# Patient Record
Sex: FEMALE | Race: WHITE | Employment: UNEMPLOYED | URBAN - METROPOLITAN AREA
[De-identification: names, ages, dates, MRNs, and addresses within clinical notes are randomized per-mention and may not be internally consistent; named-entity substitution may affect disease eponyms.]

---

## 2017-02-01 ENCOUNTER — OFFICE VISIT (OUTPATIENT)
Dept: NEUROLOGY | Age: 72
End: 2017-02-01

## 2017-02-01 DIAGNOSIS — G30.0 EARLY ONSET ALZHEIMER'S DEMENTIA WITHOUT BEHAVIORAL DISTURBANCE (HCC): ICD-10-CM

## 2017-02-01 DIAGNOSIS — F70 MENTAL RETARDATION, MILD (I.Q. 50-70): ICD-10-CM

## 2017-02-01 DIAGNOSIS — F79 MENTAL RETARDATION: ICD-10-CM

## 2017-02-01 DIAGNOSIS — R41.3 MEMORY LOSS: Primary | ICD-10-CM

## 2017-02-01 DIAGNOSIS — F20.5 RESIDUAL SCHIZOPHRENIA (HCC): ICD-10-CM

## 2017-02-01 DIAGNOSIS — F02.80 ALZHEIMER'S DISEASE OF OTHER ONSET: Primary | ICD-10-CM

## 2017-02-01 DIAGNOSIS — F02.80 EARLY ONSET ALZHEIMER'S DEMENTIA WITHOUT BEHAVIORAL DISTURBANCE (HCC): ICD-10-CM

## 2017-02-01 DIAGNOSIS — G30.8 ALZHEIMER'S DISEASE OF OTHER ONSET: Primary | ICD-10-CM

## 2017-02-01 NOTE — PROGRESS NOTES
[address] [facility],5Th Floor  [address] [facility]   [facility] [address] [facility]   [place]   [phone] Office   [phone] Fax      Neuropsychology    Exam # 2    Initial Diagnostic Interview Note      Referral:  Michelle Rosales MD, Dr. De Los Santoskaren Saeed is a 70 y.o. right handed female who was accompanied by her sister to the initial clinical interview on 2/1/17 . Please refer to her medical records for details pertaining to her history. When I saw her last (2013): Susan Matthew is a 76 y.o. right handed  female who was accompanied by her sister and brother in law to the initial clinical interview. Please refer to her medical records for details pertaining to her history. Briefly, the patient's mother passed away in 2008 and patient has been living with them since that time. She has been showing a progressive decline in short term memory. Decided she was too fat in September and refused to eat solid food. Has been only eating liquids since then. Ensure and Boost only. She does not read or write. Has lived with her parents (and now sister) her whole life. Pureed food as well. Gag reflex when taking pills. May have completed the second year. She has had chronic problems with learning. Patient does talk about daily activities. News events and things. Needs more assistance. Forgets to put the dishes away, pick her clothes up, etc. Not driving. Talks to herself a lot. Most of the time when she talks to herself she is having a happy conversation with herself.      Says I'm fat. I'm too fat. 10-15 times. Pull up her shirt, look at stomach, saying she's fat. While she's drinking a shake. Very impulsive about it. Washes her hands 15 times while trying to eats. Gags herself. Used to be a lot more fun. Was able to do some chores.      No psychiatrist currently.      Was in [place] when age 25s.      Schizophrenia versus schizoaffective. Dementia questions. MR?     My diagnostic impressions at that time included: This patient generated abnormal range neurocognitive profile with respect to neurocognitive functioning. In this regard, she is showing mild impairments with memory and executive functioning and construction. Her performance across all other neurocognitive domains assessed were within normal limits. Emotionally, there is concern for mild anxiety, which likely exacerbates the underlying cognitive problems to some degree. .   In my opinion, this profile is consistent with chronic MR and psychiatric problems (likely schizophrenia) exacerbated by what is now evident to be at least mild to moderate dementia. I suggest consideration for memory management medication, medication for attention, and a review of her psychiatric medication management. She should be encouraged to remain as mentally, socially, and physically active as possible.      The patient's generated cognitive difficulties are significant to the degree whereby it is my opinion that she should not live independently without appropriate supervision for all ADLs, including nutritional/meal preparation supervision, medication management supervision, and supervision of financial dealings. She is not competent. No driving. She lives with her sister and her family and so long as they are able to provide an appropriate level of supervision for her, I agree with her current living arrangement. She may, however, require increased supervision over time, and a plan should be in place for same in case she continues a downward trend in terms of her neurocognition. I am concerned about her not eating as well, and her perseveration that she is fat. This is likely a combination of dementia and psychiatric issues.  Baseline data now established, which will important in order to track her over time and adjust treatment and living arrangements prn.      I will discuss these findings with the patient when she follows up with me in the near future. A follow up Neuropsychological Evaluation is indicated on a prn basis.      DIAGNOSES:   Mild To Moderate Dementia  Chronic Schizophrenia  Chronic MR     Patient says her memory is fine. She continues to put food in her mouth and does not swallow. Has to have cues for ADLs. She is more forgetful. Memory has been getting worse. No new medical issues to report. Can't do two things at a time. Nutrition seems to be okay. Sister gives her a lot of soups. Receives supervision for all ADLs. Updated testing needed to track changes. Talks to her self. Not as resistant to things as she was before. Up two or three times at night.      MMSE: do today    Clock: do today    TOPF: she cannot read    Historian: Fair  Praxis: No UE apraxia  R/L Orientation: Intact to self and to other  Dress: within normal limits   Weight: within normal limits   Appearance/Hygiene: within normal limits   Gait: Slow with walker  Assistive Devices: Walker  Mood: within normal limits   Affect: within normal limits   Comprehension: within normal limits   Thought Process: within normal limits   Expressive Language: within normal limits   Receptive Language: within normal limits   Motor:  No cognitive or motor perseveration  ETOH: Denied  Tobacco: Denied  Illicit: Denied  SI/HI: Denied  Psychosis: as noted above  Insight: Poor  Judgment: Poor  Other Psych:      Past Medical History   Diagnosis Date    Anxiety     Constipation     Cough     Degenerative joint disease of knee      both knees    Edema     Hypercholesterolemia     Hypertension     Memory loss     MR (mental retardation)     Snoring        Past Surgical History   Procedure Laterality Date    Colonoscopy  2008     neg    Hx cataract removal         Allergies   Allergen Reactions    Exelon [Rivastigmine] Other (comments)       Family History   Problem Relation Age of Onset    Heart Disease Mother     Hypertension Mother    [facility] Elevated Lipids Mother     Hypertension Sister    [facility] Elevated Lipids Sister     Elevated Lipids Brother     Hypertension Brother     Elevated Lipids Sister     Hypertension Sister        Social History   Substance Use Topics    Smoking status: Never Smoker    Smokeless tobacco: Never Used    Alcohol use No       Current Outpatient Prescriptions   Medication Sig Dispense Refill    lisinopril (PRINIVIL, ZESTRIL) 5 mg tablet TAKE ONE TABLET BY MOUTH DAILY 90 Tab 0    simvastatin (ZOCOR) 40 mg tablet TAKE ONE TABLET BY MOUTH ONCE NIGHTLY 90 Tab 0    trifluoperazine (STELAZINE) 1 mg tablet TAKE ONE TABLET BY MOUTH TWICE A DAY *GENERIC FOR STELAZINE* 60 Tab 3    rivastigmine (EXELON) 9.5 mg/24 hr patch APPLY ONE PATCH TO THE SKIN DAILY. 30 Patch 11    benztropine (COGENTIN) 1 mg tablet TAKE ONE TABLET BY MOUTH TWICE A DAY. GENERIC FOR COGENTIN. 60 Tab 11         Plan:  Obtain authorization for testing from [employer]. Report to follow once testing, scoring, and interpretation completed. ? Organic based neurocognitive issues versus mood disorder or combination of same. ? Problems organic, functional, or both? This note will not be viewable in [address]

## 2017-02-02 NOTE — PROGRESS NOTES
[address] [facility],5Th Floor  [address] [facility]   [place] [address]   [address]   [phone] Office   [phone] Fax      Neuropsychological Evaluation Report    Exam # 2  Referral:  Shantell Olivas MD, Dr. Kevin Tania is a 70 y.o. right handed female who was accompanied by her sister to the initial clinical interview on 2/1/17 . Please refer to her medical records for details pertaining to her history. When I saw her last (2013): Burton Raphael is a 76 y.o. right handed  female who was accompanied by her sister and brother in law to the initial clinical interview. Please refer to her medical records for details pertaining to her history. Briefly, the patient's mother passed away in 2008 and patient has been living with them since that time. She has been showing a progressive decline in short term memory. Decided she was too fat in September and refused to eat solid food. Has been only eating liquids since then. Ensure and Boost only. She does not read or write. Has lived with her parents (and now sister) her whole life. Pureed food as well. Gag reflex when taking pills. May have completed the second year. She has had chronic problems with learning. Patient does talk about daily activities. News events and things. Needs more assistance. Forgets to put the dishes away, pick her clothes up, etc. Not driving. Talks to herself a lot. Most of the time when she talks to herself she is having a happy conversation with herself.      Says I'm fat. I'm too fat. 10-15 times. Pull up her shirt, look at stomach, saying she's fat. While she's drinking a shake. Very impulsive about it. Washes her hands 15 times while trying to eats. Gags herself. Used to be a lot more fun. Was able to do some chores.      No psychiatrist currently.      Was in [place] when age 25s.      Schizophrenia versus schizoaffective. Dementia questions. MR?      My diagnostic impressions at that time included: This patient generated abnormal range neurocognitive profile with respect to neurocognitive functioning. In this regard, she is showing mild impairments with memory and executive functioning and construction. Her performance across all other neurocognitive domains assessed were within normal limits. Emotionally, there is concern for mild anxiety, which likely exacerbates the underlying cognitive problems to some degree. .   In my opinion, this profile is consistent with chronic MR and psychiatric problems (likely schizophrenia) exacerbated by what is now evident to be at least mild to moderate dementia. I suggest consideration for memory management medication, medication for attention, and a review of her psychiatric medication management. She should be encouraged to remain as mentally, socially, and physically active as possible.      The patient's generated cognitive difficulties are significant to the degree whereby it is my opinion that she should not live independently without appropriate supervision for all ADLs, including nutritional/meal preparation supervision, medication management supervision, and supervision of financial dealings. She is not competent. No driving. She lives with her sister and her family and so long as they are able to provide an appropriate level of supervision for her, I agree with her current living arrangement. She may, however, require increased supervision over time, and a plan should be in place for same in case she continues a downward trend in terms of her neurocognition. I am concerned about her not eating as well, and her perseveration that she is fat. This is likely a combination of dementia and psychiatric issues.  Baseline data now established, which will important in order to track her over time and adjust treatment and living arrangements prn.      I will discuss these findings with the patient when she follows up with me in the near future. A follow up Neuropsychological Evaluation is indicated on a prn basis.      DIAGNOSES:   Mild To Moderate Dementia  Chronic Schizophrenia  Chronic MR     Patient says her memory is fine. She continues to put food in her mouth and does not swallow. Has to have cues for ADLs. She is more forgetful. Memory has been getting worse. No new medical issues to report. Can't do two things at a time. Nutrition seems to be okay. Sister gives her a lot of soups. Receives supervision for all ADLs. Updated testing needed to track changes. Talks to her self. Not as resistant to things as she was before. Up two or three times at night. She seems to continue talking to herself.       MMSE: do today    Clock: do today    TOPF: she cannot read    Historian: Fair  Praxis: No UE apraxia  R/L Orientation: Intact to self and to other  Dress: within normal limits   Weight: within normal limits   Appearance/Hygiene: within normal limits   Gait: Slow with walker  Assistive Devices: Walker  Mood: within normal limits   Affect: within normal limits   Comprehension: within normal limits   Thought Process: within normal limits   Expressive Language: within normal limits   Receptive Language: within normal limits   Motor:  No cognitive or motor perseveration  ETOH: Denied  Tobacco: Denied  Illicit: Denied  SI/HI: Denied  Psychosis: as noted above  Insight: Poor  Judgment: Poor  Other Psych:      Past Medical History   Diagnosis Date    Anxiety     Constipation     Cough     Degenerative joint disease of knee      both knees    Edema     Hypercholesterolemia     Hypertension     Memory loss     MR (mental retardation)     Snoring        Past Surgical History   Procedure Laterality Date    Colonoscopy  2008     neg    Hx cataract removal         Allergies   Allergen Reactions    Exelon [Rivastigmine] Other (comments)       Family History   Problem Relation Age of Onset    Heart Disease Mother     Hypertension Mother  Elevated Lipids Mother     Hypertension Sister    [facility] Elevated Lipids Sister     Elevated Lipids Brother     Hypertension Brother     Elevated Lipids Sister     Hypertension Sister        Social History   Substance Use Topics    Smoking status: Never Smoker    Smokeless tobacco: Never Used    Alcohol use No       Current Outpatient Prescriptions   Medication Sig Dispense Refill    lisinopril (PRINIVIL, ZESTRIL) 5 mg tablet TAKE ONE TABLET BY MOUTH DAILY 90 Tab 0    simvastatin (ZOCOR) 40 mg tablet TAKE ONE TABLET BY MOUTH ONCE NIGHTLY 90 Tab 0    trifluoperazine (STELAZINE) 1 mg tablet TAKE ONE TABLET BY MOUTH TWICE A DAY *GENERIC FOR STELAZINE* 60 Tab 3    rivastigmine (EXELON) 9.5 mg/24 hr patch APPLY ONE PATCH TO THE SKIN DAILY. 30 Patch 11    benztropine (COGENTIN) 1 mg tablet TAKE ONE TABLET BY MOUTH TWICE A DAY. GENERIC FOR COGENTIN. 60 Tab 11         Plan:  Obtain authorization for testing from [facility]. Report to follow once testing, scoring, and interpretation completed. ? Organic based neurocognitive issues versus mood disorder or combination of same. ? Problems organic, functional, or both? This note will not be viewable in [address]       Neuropsychological Evaluation Results  Patient Testing 2/1/17 Report Completed 2/2/17  A Psychometrist Assisted w/ portions of this evaluation while under my direct supervision    The following tests were administered: Neuropsychological Mental Status Exam*, Mini-Mental Status Examination*, Revised Memory & Behavior Checklist*, Clock Drawing Test*,  Test of Premorbid Functioning*, Verbal Fluency Tests, Rosalie Naming Test - Revised, CPT-III**, Repeatable Battery For The Assessment Of Neuropsychological Status, John Dementia Rating Scale - 2, WASI-II, Trailmaking Test Parts A & B, Geriatric Depression Inventory, Bai Anxiety Inventory, Lewis Symptom Checklist*, History Taking & Clinical Interview With The Patient*, Additional History Taking With The Patient's Daughter*, Review Of Available Records*. Note = * - Neuropsychologist administered and interpreted, ** - Computer administered and Neuropsychologist interpreted, If no asterisk then psychometrist administered and neuropsychologist interpreted. Test Findings:  Note:  The patients raw data have been compared with currently available norms which include demographic corrections for age, gender, and/or education. Sometimes, the patients scores are compared to demographically similar individuals as close to the patients age, education level, etc., as possible. \"Average\" is viewed as being +/- 1 standard deviation (SD) from the stated mean for a particular test score. \"Low average\" is viewed as being between 1 and 2 SD below the mean, and above average is viewed as being 1 and 2 SD above the mean. Scores falling in the borderline range (between 1-1/2 and 2 SD below the mean) are viewed with particular attention as to whether they are normal or abnormal neurocognitive test scores. Other methods of inference in analyzing the test data are also utilized, including the pattern and range of scores in the profile, bilateral motor functions, and the presence, if any, of pathognomonic signs. Behaviorally, the patient was friendly and cooperative and appeared motivated to perform well during this examination. Scores were converted using norms from demographically similar individuals as close to the patient's education level as possible. She cannot read or write and this was considered in terms of test interpretation. Within this context, the results of this evaluation are viewed as a valid reflection of the patients actual neurocognitive and emotional status. The patient's score of 13/30 on the Mini-Mental Status Exam was impaired. In this regard, she was not oriented to year, date, state, county, city, or floor. Registration of three words was 2/3 correct on Trial 1.   Serial 7s 0/5.  Repetition was impaired. Comprehension and carry out of a three step command was 2/3 correct. Reading was impaired. Writing was impaired. Visual construction was impaired. Clock drawing was impaired. Her structured word list fluency, as assessed by the FAS Test, was within the moderately to severely impaired range with a T score of 22. Category fluency was within the severely impaired range with a T score of 15. Confrontation naming ability, as assessed by the [facility] - Revised, was within the mildly to moderately impaired range at 21/60 correct (T = 30). The patient was administered the [facility] Continuous Performance Test-III and review of the subscales concerns for inattentiveness without impulsivity. This pattern of performance is indicative of a patient who is at increased risk for day-to-day problems with sustained visual attention/concentration. The patient was administered the John Dementia Rating Scale -2 and her total score of 91/144 correct corresponds to an age- and education- corrected MOANS Scaled Score of 2 (<1st %ile) and indicates a severely impaired level of performance. In this regard, her simple attention, construction, initiation/perseveration, conceptualization, and memory capacity were impaired. This pattern of performance is indicative of a patient who is at increased risk for day-to-day problems across a variety of neurocognitive domains.        The patient was administered the Repeatable Battery for the Assessment Of Neuropsychological Status (RBANS) and her age-corrected scores are as follows:    Domain:   Index Score %ile Classification    Immediate Memory 44  <0.1 Extremely Low    Delayed Memory 48  <0.1 Extremely Low    Visuospatial Skills 50  <0.1 Extremely Low     Language  64  1 Extremely Low    Attention  43  <0.1 Extremely Low    Overall Functioning 46  <0.1 Extremely Low    ______________________________________________    As can be seen, she is showing problems across all neurocognitive domains assessed. The patient was administered the WASI-II and generated a Full-4 Estimated FSIQ score of 58 (0.3rd%ile) which was within the extremely low range. Her estimated Verbal Comprehension Index score of 59 (0.3rd %ile) was within the extremely low range. Her estimated Perceptual Reasoning Index score of 62 was also within the extremely low range. Simple timed visual motor sequencing (Trailmaking Test Part A) was within the moderately impaired range with a T score of 29. Her performance on a similar, but more complex task of timed visual motor sequencing (Trailmaking Test Part B) was within the moderately impaired range with a T score of 29. This latter test was discontinued. This pattern of performance is indicative of a patient who is at increased risk for day-to-day problems with executive functioning. The patient did not report any current pain on the Jez-Melzack Pain Questionnaire. Her Geriatric Depression Inventory score of 3 was within the normal range. Her Bai Anxiety Inventory Score of 2 reflected minimal anxiety. The Lewis Symptom Checklist was discontinued due to patient comprehension issues. The sister completed the Adaptive Behavior Assessment System -3 and reported marked problems across all adaptive domains assessed by this instrument. This includes problems with general adaptive skills (0.4th %ile), conceptual skills (0.2nd %ile), social skills (1st %ile), and practical skills (1st %ile). Impressions and Recommendations: This patient again generated an abnormal range neurocognitive profile with respect to neurocognitive functioning. In this regard, previously mild memory loss issues have now declined to the moderate range and there is a general trend of decline across all other neurocognitive domains assessed as well. Dementia has progressed to the moderate to severe range now. Emotionally, she denied clinically significant psychopathology and appears to be doing well with her current psychiatric medication management. She has a history of chronic intellectual deficits and schizophrenia. She continues to appear to be responding to internal stimuli but I do not see evidence of a decline in mood. She has to be reminded to swallow when eating and needs assistance and supervision across all ADLs. She should not be left alone for any lengthy period of time. The family may wish to consider transfer to assisted living if they are unable to consistently provide supervision for her. She may, in the interim, require increased in-home health care as well. She is not competent. Dementia is progressing moderately, and she may not be testable in the future. Prognosis is grim. Consider a review of her current memory management medication. We now have baseline and updated data on her. As noted, I may not be able to test her in the future but I remain available for consultations with the family as needed. Clinical correlation is, of course, indicated. I will discuss these findings with the patient when she follows up with me in the near future. A follow up Neuropsychological Evaluation is indicated on a prn basis. DIAGNOSES:  Moderate To Severe Dementia     Chronic ID     Schizophrenia - Chronic     The above information is based upon information currently available to me. If there is any additional information of which I am currently unaware, I would be more than happy to review it upon having it made available to me. Thank you for the opportunity to see this interesting individual.     Sincerely,       Livier Basilio. Kelli Saldana, EdS        dd  CC: Chelita Donnelly MD, Dr. Madhu Richard    2 units -[id number]  Record review. Review of history provided by patient. Review of collaborative information. Testing by Clinician. Review of raw data. Scoring.  Report writing of individual tests administered by Clinician. Integration of individual tests administered by psychometrist (that were previously reported and billed under psychometry code below) with testing by clinician and review of records/history/collaborative information. Case Conceptualization, Report writing. Coordination Of Care. 4 units  -[id number] - Psychometrist test prep, administration, and scoring under clinician's direct supervision. Clinical interpretation of individual tests administered by psychometrist .  Clinician report of individual tests administered by psychometrist.    \"Unit\" is defined by CPT/National Guidelines (31 - 60 minutes). Integral services including scoring of raw data, data interpretation, case conceptualization, report writing etcetera were initiated after the patient finished testing/raw data collected and was completed on the date the report was signed.

## 2017-03-02 RX ORDER — LISINOPRIL 5 MG/1
TABLET ORAL
Qty: 90 TAB | Refills: 0 | Status: SHIPPED | OUTPATIENT
Start: 2017-03-02 | End: 2017-06-02 | Stop reason: SDUPTHER

## 2017-03-23 ENCOUNTER — HOSPITAL ENCOUNTER (OUTPATIENT)
Dept: LAB | Age: 72
Discharge: HOME | End: 2017-03-23
Payer: MEDICARE

## 2017-03-23 ENCOUNTER — OFFICE VISIT (OUTPATIENT)
Dept: FAMILY MEDICINE CLINIC | Age: 72
End: 2017-03-23

## 2017-03-23 VITALS
HEIGHT: 65 IN | RESPIRATION RATE: 16 BRPM | BODY MASS INDEX: 21.59 KG/M2 | OXYGEN SATURATION: 97 % | SYSTOLIC BLOOD PRESSURE: 143 MMHG | TEMPERATURE: 97.8 F | WEIGHT: 129.6 LBS | DIASTOLIC BLOOD PRESSURE: 82 MMHG | HEART RATE: 69 BPM

## 2017-03-23 DIAGNOSIS — I10 ESSENTIAL HYPERTENSION: Primary | ICD-10-CM

## 2017-03-23 DIAGNOSIS — F20.5 RESIDUAL SCHIZOPHRENIA (HCC): ICD-10-CM

## 2017-03-23 DIAGNOSIS — F02.80 ALZHEIMER'S DISEASE OF OTHER ONSET: ICD-10-CM

## 2017-03-23 DIAGNOSIS — G30.8 ALZHEIMER'S DISEASE OF OTHER ONSET: ICD-10-CM

## 2017-03-23 DIAGNOSIS — E78.00 HYPERCHOLESTEREMIA: ICD-10-CM

## 2017-03-23 PROCEDURE — 80053 COMPREHEN METABOLIC PANEL: CPT

## 2017-03-23 PROCEDURE — 80061 LIPID PANEL: CPT

## 2017-03-23 NOTE — MR AVS SNAPSHOT
Visit Information Date & Time Provider Department Dept. Phone Encounter #  
 3/23/2017  1:45 PM Yung Martin MD [address] [phone] [id number] Follow-up Instructions Return if symptoms worsen or fail to improve. Upcoming Health Maintenance Date Due DTaP/Tdap/Td series (1 - Tdap) 11/2/1966 Pneumococcal 65+ Low/Medium Risk (1 of 2 - PCV13) 11/2/2010 GLAUCOMA SCREENING Q2Y 7/17/2016 MEDICARE YEARLY EXAM 7/15/2017 COLONOSCOPY 7/8/2018 BREAST CANCER SCRN MAMMOGRAM 8/5/2018 Allergies as of 3/23/2017  Review Complete On: 3/23/2017 By: Yung Martin MD  
  
 Severity Noted Reaction Type Reactions Exelon [Rivastigmine]  01/30/2014    Other (comments) Current Immunizations  Reviewed on 7/14/2016 Name Date Influenza High Dose Vaccine PF 10/2/2014 Zoster Vaccine, Live 9/26/2013 Not reviewed this visit You Were Diagnosed With   
  
 Codes Comments Essential hypertension    -  Primary ICD-10-CM: I10 
ICD-9-CM: 401.9 Residual [facility] ([facility]     ICD-10-CM: F20.5 ICD-9-CM: 295.60 Alzheimer's disease of other onset     ICD-10-CM: G30.8, F02.80 Hypercholesteremia     ICD-10-CM: E78.00 ICD-9-CM: 272.0 Vitals BP Pulse Temp Resp Height(growth percentile) Weight(growth percentile) 143/82 69 97.8 °F (36.6 °C) (Oral) 16 5' 5\" (1.651 m) 129 lb 9.6 oz (58.8 kg) SpO2 BMI OB Status Smoking Status 97% 21.57 kg/m2 Postmenopausal Never Smoker Vitals History BMI and BSA Data Body Mass Index Body Surface Area  
 21.57 kg/m 2 1.64 m 2 Preferred Pharmacy Pharmacy Name Phone Rich Bartholomew [address], [address] [phone] Your Updated Medication List  
  
   
This list is accurate as of: 3/23/17  2:00 PM.  Always use your most recent med list.  
  
  
  
  
 benztropine 1 mg tablet Commonly known as:  COGENTIN  
 TAKE ONE TABLET BY MOUTH TWICE A DAY. GENERIC FOR COGENTIN. lisinopril 5 mg tablet Commonly known as:  PRINIVIL, ZESTRIL  
TAKE ONE TABLET BY MOUTH DAILY  
  
 rivastigmine 9.5 mg/24 hr patch Commonly known as:  EXELON  
APPLY ONE PATCH TO THE SKIN DAILY. simvastatin 40 mg tablet Commonly known as:  ZOCOR  
TAKE ONE TABLET BY MOUTH ONCE NIGHTLY  
  
 trifluoperazine 1 mg tablet Commonly known as:  STELAZINE  
TAKE ONE TABLET BY MOUTH TWICE A DAY *GENERIC FOR STELAZINE* We Performed the Following LIPID PANEL [78184 CPT(R)] METABOLIC PANEL, COMPREHENSIVE [99241 CPT(R)] Follow-up Instructions Return if symptoms worsen or fail to improve. Introducing [facility]! Lyubov Cowan introduces [employer] patient portal. Now you can access parts of your medical record, email your doctor's office, and request medication refills online. 1. In your internet browser, go to [email or web address]. [email or web address]/[email or web address] 2. Click on the First Time User? Click Here link in the Sign In box. You will see the New Member Sign Up page. 3. Enter your [employer] Access Code exactly as it appears below. You will not need to use this code after youve completed the sign-up process. If you do not sign up before the expiration date, you must request a new code. · [employer] Access Code: [email or web address] Expires: 6/21/2017  2:00 PM 
 
4. Enter the last four digits of your Social Security Number (xxxx) and Date of Birth (mm/dd/yyyy) as indicated and click Submit. You will be taken to the next sign-up page. 5. Create a MeeVeet ID. This will be your [employer] login ID and cannot be changed, so think of one that is secure and easy to remember. 6. Create a [employer] password. You can change your password at any time. 7. Enter your Password Reset Question and Answer. This can be used at a later time if you forget your password. 8. Enter your e-mail address. You will receive e-mail notification when new information is available in [address] 9. Click Sign Up. You can now view and download portions of your medical record. 10. Click the Download Summary menu link to download a portable copy of your medical information. If you have questions, please visit the Frequently Asked Questions section of the [employer] website. Remember, [employer] is NOT to be used for urgent needs. For medical emergencies, dial 911. Now available from your iPhone and Android! Please provide this summary of care documentation to your next provider. Your primary care clinician is listed as AERL SERRATO. If you have any questions after today's visit, please call [phone].

## 2017-03-23 NOTE — PROGRESS NOTES
Chief Complaint   Patient presents with    Follow-up     6 Month F/U     1. Have you been to the ER, urgent care clinic since your last visit? Hospitalized since your last visit? No    2. Have you seen or consulted any other health care providers outside of the [address] [place] [address] since your last visit? Include any pap smears or colon screening. No      Chief Complaint   Patient presents with    Follow-up     6 Month F/U     she is a 70y.o. year old female who presents for evalution. Reviewed PmHx, RxHx, FmHx, SocHx, AllgHx and updated and dated in the chart. Patient Active Problem List    Diagnosis    Advance care planning     POA      Alzheimer's disease    Syncope and collapse    Anxiety    Edema    Snoring    Cough    Constipation    Memory loss    Dementia in conditions classified elsewhere without behavioral disturbance    Schizophrenia ([facility]    Mental retardation    Hypercholesteremia    HTN (hypertension)    Mental disability    DJD (degenerative joint disease)       Review of Systems - negative except as listed above in the HPI    Objective:     Vitals:    03/23/17 1337   BP: 143/82   Pulse: 69   Resp: 16   Temp: 97.8 °F (36.6 °C)   TempSrc: Oral   SpO2: 97%   Weight: 129 lb 9.6 oz (58.8 kg)   Height: 5' 5\" (1.651 m)     Physical Examination: General appearance - alert, well appearing, and in no distress  Chest - clear to auscultation, no wheezes, rales or rhonchi, symmetric air entry  Heart - normal rate, regular rhythm, normal S1, S2, no murmurs, rubs, clicks or gallops    Assessment/ Plan: Eli Boyer was seen today for follow-up.     Diagnoses and all orders for this visit:    Essential hypertension  -     METABOLIC PANEL, COMPREHENSIVE  -     LIPID PANEL    Residual schizophrenia ([facility]  Alzheimer's disease of other onset  -stable    Hypercholesteremia  -     METABOLIC PANEL, COMPREHENSIVE  -     LIPID PANEL       Follow-up Disposition:  Return if symptoms worsen or fail to improve. I have discussed the diagnosis with the patient and the intended plan as seen in the above orders. The patient understands and agrees with the plan. The patient has received an after-visit summary and questions were answered concerning future plans. Medication Side Effects and Warnings were discussed with patient  Patient Labs were reviewed and or requested:  Patient Past Records were reviewed and or requested    Snow Olmedo M.D. There are no Patient Instructions on file for this visit.

## 2017-03-24 LAB
ALBUMIN SERPL-MCNC: 4.1 G/DL (ref 3.5–4.8)
ALBUMIN/GLOB SERPL: 1.8 {RATIO} (ref 1.2–2.2)
ALP SERPL-CCNC: 84 IU/L (ref 39–117)
ALT SERPL-CCNC: 7 IU/L (ref 0–32)
AST SERPL-CCNC: 15 IU/L (ref 0–40)
BILIRUB SERPL-MCNC: <0.2 MG/DL (ref 0–1.2)
BUN SERPL-MCNC: 12 MG/DL (ref 8–27)
BUN/CREAT SERPL: 16 (ref 11–26)
CALCIUM SERPL-MCNC: 9.4 MG/DL (ref 8.7–10.3)
CHLORIDE SERPL-SCNC: 96 MMOL/L (ref 96–106)
CHOLEST SERPL-MCNC: 150 MG/DL (ref 100–199)
CO2 SERPL-SCNC: 25 MMOL/L (ref 18–29)
CREAT SERPL-MCNC: 0.76 MG/DL (ref 0.57–1)
GLOBULIN SER CALC-MCNC: 2.3 G/DL (ref 1.5–4.5)
GLUCOSE SERPL-MCNC: 125 MG/DL (ref 65–99)
HDLC SERPL-MCNC: 46 MG/DL
INTERPRETATION, 910389: NORMAL
LDLC SERPL CALC-MCNC: 69 MG/DL (ref 0–99)
POTASSIUM SERPL-SCNC: 4.6 MMOL/L (ref 3.5–5.2)
PROT SERPL-MCNC: 6.4 G/DL (ref 6–8.5)
SODIUM SERPL-SCNC: 136 MMOL/L (ref 134–144)
TRIGL SERPL-MCNC: 177 MG/DL (ref 0–149)
VLDLC SERPL CALC-MCNC: 35 MG/DL (ref 5–40)

## 2017-03-28 NOTE — PROGRESS NOTES
ID verified X 3 by Yony(on Hippa) informed of increase in pt sugars & need to come in office to have A1C done- voiced understanding & appt made for 3/30

## 2017-03-30 ENCOUNTER — OFFICE VISIT (OUTPATIENT)
Dept: FAMILY MEDICINE CLINIC | Age: 72
End: 2017-03-30

## 2017-03-30 VITALS
SYSTOLIC BLOOD PRESSURE: 143 MMHG | RESPIRATION RATE: 18 BRPM | HEART RATE: 78 BPM | DIASTOLIC BLOOD PRESSURE: 79 MMHG | BODY MASS INDEX: 21.49 KG/M2 | WEIGHT: 129 LBS | HEIGHT: 65 IN | TEMPERATURE: 97.6 F | OXYGEN SATURATION: 99 %

## 2017-03-30 DIAGNOSIS — R73.09 ELEVATED GLUCOSE: Primary | ICD-10-CM

## 2017-03-30 LAB — HBA1C MFR BLD HPLC: 5.4 %

## 2017-03-30 NOTE — PROGRESS NOTES
Patient here for a1c check. 1. Have you been to the ER, urgent care clinic since your last visit? Hospitalized since your last visit? No    2. Have you seen or consulted any other health care providers outside of the [address] [place] [address] since your last visit? Include any pap smears or colon screening. No     Chief Complaint   Patient presents with    Abnormal Lab Results     a1c in office     she is a 70y.o. year old female who presents for evalution. Reviewed PmHx, RxHx, FmHx, SocHx, AllgHx and updated and dated in the chart. Patient Active Problem List    Diagnosis    Advance care planning     POA      Alzheimer's disease    Syncope and collapse    Anxiety    Edema    Snoring    Cough    Constipation    Memory loss    Dementia in conditions classified elsewhere without behavioral disturbance    [facility] ([facility]    Mental retardation    Hypercholesteremia    HTN (hypertension)    Mental disability    DJD (degenerative joint disease)       Review of Systems - negative except as listed above in the HPI    Objective:     Vitals:    03/30/17 1332   BP: 143/79   Pulse: 78   Resp: 18   Temp: 97.6 °F (36.4 °C)   SpO2: 99%   Weight: 129 lb (58.5 kg)   Height: 5' 5\" (1.651 m)     Physical Examination: General appearance - alert, well appearing, and in no distress  Chest - clear to auscultation, no wheezes, rales or rhonchi, symmetric air entry  Heart - normal rate, regular rhythm, normal S1, S2, no murmurs, rubs, clicks or gallops  Abdomen - soft, nontender, nondistended, no masses or organomegaly      Assessment/ Plan: Noemi Ferris was seen today for abnormal lab results.     Diagnoses and all orders for this visit:    Elevated glucose  -     AMB POC HEMOGLOBIN A1C  Lab Results   Component Value Date/Time    Hemoglobin A1c 5.8 10/01/2013 06:21 PM    Hemoglobin A1c (POC) 5.4 03/30/2017 01:59 PM          Follow-up Disposition: Not on File    I have discussed the diagnosis with the patient and the intended plan as seen in the above orders. The patient understands and agrees with the plan. The patient has received an after-visit summary and questions were answered concerning future plans. Medication Side Effects and Warnings were discussed with patient  Patient Labs were reviewed and or requested:  Patient Past Records were reviewed and or requested    Helen Loyola M.D. There are no Patient Instructions on file for this visit.

## 2017-04-27 RX ORDER — RIVASTIGMINE 9.5 MG/24H
PATCH, EXTENDED RELEASE TRANSDERMAL
Qty: 90 PATCH | Refills: 3 | Status: SHIPPED | OUTPATIENT
Start: 2017-04-27 | End: 2018-03-08 | Stop reason: SDUPTHER

## 2017-06-02 DIAGNOSIS — E78.00 HYPERCHOLESTEREMIA: ICD-10-CM

## 2017-06-02 RX ORDER — SIMVASTATIN 40 MG/1
TABLET, FILM COATED ORAL
Qty: 90 TAB | Refills: 0 | Status: SHIPPED | OUTPATIENT
Start: 2017-06-02 | End: 2017-09-02 | Stop reason: SDUPTHER

## 2017-06-02 RX ORDER — LISINOPRIL 5 MG/1
TABLET ORAL
Qty: 90 TAB | Refills: 0 | Status: SHIPPED | OUTPATIENT
Start: 2017-06-02 | End: 2017-09-02 | Stop reason: SDUPTHER

## 2017-08-01 RX ORDER — TRIFLUOPERAZINE HYDROCHLORIDE 1 MG/1
TABLET, FILM COATED ORAL
Qty: 60 TAB | Refills: 2 | Status: SHIPPED | OUTPATIENT
Start: 2017-08-01 | End: 2017-08-31 | Stop reason: SDUPTHER

## 2017-08-11 ENCOUNTER — HOSPITAL ENCOUNTER (OUTPATIENT)
Dept: MAMMOGRAPHY | Age: 72
Discharge: HOME | End: 2017-08-11
Attending: FAMILY MEDICINE
Payer: MEDICARE

## 2017-08-11 DIAGNOSIS — Z12.31 VISIT FOR SCREENING MAMMOGRAM: ICD-10-CM

## 2017-08-11 PROCEDURE — 77067 SCR MAMMO BI INCL CAD: CPT

## 2017-08-31 ENCOUNTER — OFFICE VISIT (OUTPATIENT)
Dept: NEUROLOGY | Age: 72
End: 2017-08-31

## 2017-08-31 VITALS
WEIGHT: 137 LBS | SYSTOLIC BLOOD PRESSURE: 137 MMHG | HEART RATE: 63 BPM | RESPIRATION RATE: 18 BRPM | OXYGEN SATURATION: 97 % | HEIGHT: 65 IN | DIASTOLIC BLOOD PRESSURE: 68 MMHG | BODY MASS INDEX: 22.82 KG/M2 | TEMPERATURE: 98.6 F

## 2017-08-31 DIAGNOSIS — F03.90 DEMENTIA WITHOUT BEHAVIORAL DISTURBANCE, UNSPECIFIED DEMENTIA TYPE: Primary | ICD-10-CM

## 2017-08-31 NOTE — MR AVS SNAPSHOT
Visit Information Date & Time Provider Department Dept. Phone Encounter #  
 8/31/2017  3:40 PM Edilson Foy  [facility] [phone] [id number] Follow-up Instructions Return in about 6 months (around 2/28/2018). Your Appointments 8/31/2017  3:40 PM  
Follow Up with Edilson Foy MD  
[address] [facility] [address]) Appt Note: fu per pt brother sarah  
 [address] [address]  
[facility] [address]  
  
    
 9/21/2017  1:20 PM  
ESTABLISHED PATIENT with Ruby Pastor MD  
[address] [facility] [facility]) Appt Note: 6mo fuv  
 [address] [address]  
[phone]  
  
   
 [address] [address] Upcoming Health Maintenance Date Due DTaP/Tdap/Td series (1 - Tdap) 11/2/1966 Pneumococcal 65+ Low/Medium Risk (1 of 2 - PCV13) 11/2/2010 GLAUCOMA SCREENING Q2Y 7/17/2016 MEDICARE YEARLY EXAM 7/15/2017 INFLUENZA AGE 9 TO ADULT 8/1/2017 COLONOSCOPY 7/8/2018 BREAST CANCER SCRN MAMMOGRAM 8/11/2019 Allergies as of 8/31/2017  Review Complete On: 8/31/2017 By: Edilson Foy MD  
  
 Severity Noted Reaction Type Reactions Exelon [Rivastigmine]  01/30/2014    Other (comments) Current Immunizations  Reviewed on 7/14/2016 Name Date Influenza High Dose Vaccine PF 10/2/2014 Zoster Vaccine, Live 9/26/2013 Not reviewed this visit You Were Diagnosed With   
  
 Codes Comments Dementia without behavioral disturbance, unspecified dementia type    -  Primary ICD-10-CM: F03.90 ICD-9-CM: 294.20 Vitals BP Pulse Temp Resp Height(growth percentile) Weight(growth percentile)  
 137/68 63 98.6 °F (37 °C) (Oral) 18 5' 5\" (1.651 m) 137 lb (62.1 kg) SpO2 BMI OB Status Smoking Status 97% 22.8 kg/m2 Postmenopausal Never Smoker Vitals History BMI and BSA Data Body Mass Index Body Surface Area  
 22.8 kg/m 2 1.69 m 2 Preferred Pharmacy Pharmacy Name Phone [facility], [address] [phone] Your Updated Medication List  
  
   
This list is accurate as of: 8/31/17  3:36 PM.  Always use your most recent med list.  
  
  
  
  
 benztropine 1 mg tablet Commonly known as:  COGENTIN  
TAKE ONE TABLET BY MOUTH TWICE A DAY. GENERIC FOR COGENTIN  
  
 lisinopril 5 mg tablet Commonly known as:  PRINIVIL, ZESTRIL  
TAKE ONE TABLET BY MOUTH DAILY  
  
 rivastigmine 9.5 mg/24 hr patch Commonly known as:  EXELON  
APPLY ONE PATCH TO THE SKIN DAILY. simvastatin 40 mg tablet Commonly known as:  ZOCOR  
TAKE ONE TABLET BY MOUTH EVERY NIGHT AT BEDTIME  
  
 trifluoperazine 1 mg tablet Commonly known as:  STELAZINE  
TAKE ONE TABLET BY MOUTH TWICE A DAY *GENERIC FOR STELAZINE* Follow-up Instructions Return in about 6 months (around 2/28/2018). Patient Instructions PRESCRIPTION REFILL POLICY [facility] Statement to Patients April 1, 2014 In an effort to ensure the large volume of patient prescription refills is processed in the most efficient and expeditious manner, we are asking our patients to assist us by calling your Pharmacy for all prescription refills, this will include also your  Mail Order Pharmacy. The pharmacy will contact our office electronically to continue the refill process. Please do not wait until the last minute to call your pharmacy. We need at least 48 hours (2days) to fill prescriptions. We also encourage you to call your pharmacy before going to  your prescription to make sure it is ready.   
 
With regard to controlled substance prescription refill requests (narcotic refills) that need to be picked up at our office, we ask your cooperation by providing us with at least 72 hours (3days) notice that you will need a refill. We will not refill narcotic prescription refill requests after 4:00pm on any weekday, Monday through Thursday, or after 2:00pm on Fridays, or on the weekends. We encourage everyone to explore another way of getting your prescription refill request processed using [employer], our patient web portal through our electronic medical record system. [employer] is an efficient and effective way to communicate your medication request directly to the office and  downloadable as an amador on your smart phone . [employer] also features a review functionality that allows you to view your medication list as well as leave messages for your physician. Are you ready to get connected? If so please review the attatched instructions or speak to any of our staff to get you set up right away! Thank you so much for your cooperation. Should you have any questions please contact our Practice Administrator. The Physicians and Staff,  [facility] A Healthy Lifestyle: Care Instructions Your Care Instructions A healthy lifestyle can help you feel good, stay at a healthy weight, and have plenty of energy for both work and play. A healthy lifestyle is something you can share with your whole family. A healthy lifestyle also can lower your risk for serious health problems, such as high blood pressure, heart disease, and diabetes. You can follow a few steps listed below to improve your health and the health of your family. Follow-up care is a key part of your treatment and safety. Be sure to make and go to all appointments, and call your doctor if you are having problems. Its also a good idea to know your test results and keep a list of the medicines you take. How can you care for yourself at home? · Do not eat too much sugar, fat, or fast foods. You can still have dessert and treats now and then. The goal is moderation. · Start small to improve your eating habits. Pay attention to portion sizes, drink less juice and soda pop, and eat more fruits and vegetables. ¨ Eat a healthy amount of food. A 3-ounce serving of meat, for example, is about the size of a deck of cards. Fill the rest of your plate with vegetables and whole grains. ¨ Limit the amount of soda and sports drinks you have every day. Drink more water when you are thirsty. ¨ Eat at least 5 servings of fruits and vegetables every day. It may seem like a lot, but it is not hard to reach this goal. A serving or helping is 1 piece of fruit, 1 cup of vegetables, or 2 cups of leafy, raw vegetables. Have an apple or some carrot sticks as an afternoon snack instead of a candy bar. Try to have fruits and/or vegetables at every meal. 
· Make exercise part of your daily routine. You may want to start with simple activities, such as walking, bicycling, or slow swimming. Try to be active 30 to 60 minutes every day. You do not need to do all 30 to 60 minutes all at once. For example, you can exercise 3 times a day for 10 or 20 minutes. Moderate exercise is safe for most people, but it is always a good idea to talk to your doctor before starting an exercise program. 
· Keep moving. Russell Curls the lawn, work in the garden, or [employer]. Take the stairs instead of the elevator at work. · If you smoke, quit. People who smoke have an increased risk for heart attack, stroke, cancer, and other lung illnesses. Quitting is hard, but there are ways to boost your chance of quitting tobacco for good. ¨ Use nicotine gum, patches, or lozenges. ¨ Ask your doctor about stop-smoking programs and medicines. ¨ Keep trying. In addition to reducing your risk of diseases in the future, you will notice some benefits soon after you stop using tobacco. If you have shortness of breath or asthma symptoms, they will likely get better within a few weeks after you quit. · Limit how much alcohol you drink. Moderate amounts of alcohol (up to 2 drinks a day for men, 1 drink a day for women) are okay. But drinking too much can lead to liver problems, high blood pressure, and other health problems. Family health If you have a family, there are many things you can do together to improve your health. · Eat meals together as a family as often as possible. · Eat healthy foods. This includes fruits, vegetables, lean meats and dairy, and whole grains. · Include your family in your fitness plan. Most people think of activities such as jogging or tennis as the way to fitness, but there are many ways you and your family can be more active. Anything that makes you breathe hard and gets your heart pumping is exercise. Here are some tips: 
¨ Walk to do errands or to take your child to school or the bus. ¨ Go for a family bike ride after dinner instead of watching TV. Where can you learn more? Go to http://kelsie-kerwin.info/. Enter F956 in the search box to learn more about \"A Healthy Lifestyle: Care Instructions. \" Current as of: July 26, 2016 Content Version: 11.3 © 1634-8330 [employer]. Care instructions adapted under license by [employer] (which disclaims liability or warranty for this information). If you have questions about a medical condition or this instruction, always ask your healthcare professional. [address] any warranty or liability for your use of this information. Will recommend continuation of Exelon patch and stay as safely and reasonably busy as possible. Will think about the Namenda as a potential add on support drug in the meantime. Revisit 6 months. Introducing [address]! [facility] introduces [employer] patient portal. Now you can access parts of your medical record, email your doctor's office, and request medication refills online.    
 
1. In your internet browser, go to [email or web address] 2. Click on the First Time User? Click Here link in the Sign In box. You will see the New Member Sign Up page. 3. Enter your [employer] Access Code exactly as it appears below. You will not need to use this code after youve completed the sign-up process. If you do not sign up before the expiration date, you must request a new code. · [employer] Access Code: [email or web address] Expires: 10/9/2017 12:08 PM 
 
4. Enter the last four digits of your Social Security Number (xxxx) and Date of Birth (mm/dd/yyyy) as indicated and click Submit. You will be taken to the next sign-up page. 5. Create a [employer] ID. This will be your [employer] login ID and cannot be changed, so think of one that is secure and easy to remember. 6. Create a [employer] password. You can change your password at any time. 7. Enter your Password Reset Question and Answer. This can be used at a later time if you forget your password. 8. Enter your e-mail address. You will receive e-mail notification when new information is available in [address] 9. Click Sign Up. You can now view and download portions of your medical record. 10. Click the Download Summary menu link to download a portable copy of your medical information. If you have questions, please visit the Frequently Asked Questions section of the [employer] website. Remember, [employer] is NOT to be used for urgent needs. For medical emergencies, dial 911. Now available from your iPhone and Android! Please provide this summary of care documentation to your next provider. Your primary care clinician is listed as EARL SERRATO. If you have any questions after today's visit, please call [phone].

## 2017-08-31 NOTE — PATIENT INSTRUCTIONS
[facility]   Statement to Patients  April 1, 2014      In an effort to ensure the large volume of patient prescription refills is processed in the most efficient and expeditious manner, we are asking our patients to assist us by calling your Pharmacy for all prescription refills, this will include also your  Mail Order Pharmacy. The pharmacy will contact our office electronically to continue the refill process. Please do not wait until the last minute to call your pharmacy. We need at least 48 hours (2days) to fill prescriptions. We also encourage you to call your pharmacy before going to  your prescription to make sure it is ready. With regard to controlled substance prescription refill requests (narcotic refills) that need to be picked up at our office, we ask your cooperation by providing us with at least 72 hours (3days) notice that you will need a refill. We will not refill narcotic prescription refill requests after 4:00pm on any weekday, Monday through Thursday, or after 2:00pm on Fridays, or on the weekends. We encourage everyone to explore another way of getting your prescription refill request processed using B[employer] Live, our patient web portal through our electronic medical record system. [employer] is an efficient and effective way to communicate your medication request directly to the office and  downloadable as an amador on your smart phone . [employer] also features a review functionality that allows you to view your medication list as well as leave messages for your physician. Are you ready to get connected? If so please review the attatched instructions or speak to any of our staff to get you set up right away! Thank you so much for your cooperation. Should you have any questions please contact our Practice Administrator.     The Physicians and Staff,  [facility]                A Healthy Lifestyle: Care Instructions  Your Care Instructions  A healthy lifestyle can help you feel good, stay at a healthy weight, and have plenty of energy for both work and play. A healthy lifestyle is something you can share with your whole family. A healthy lifestyle also can lower your risk for serious health problems, such as high blood pressure, heart disease, and diabetes. You can follow a few steps listed below to improve your health and the health of your family. Follow-up care is a key part of your treatment and safety. Be sure to make and go to all appointments, and call your doctor if you are having problems. Its also a good idea to know your test results and keep a list of the medicines you take. How can you care for yourself at home? · Do not eat too much sugar, fat, or fast foods. You can still have dessert and treats now and then. The goal is moderation. · Start small to improve your eating habits. Pay attention to portion sizes, drink less juice and soda pop, and eat more fruits and vegetables. ¨ Eat a healthy amount of food. A 3-ounce serving of meat, for example, is about the size of a deck of cards. Fill the rest of your plate with vegetables and whole grains. ¨ Limit the amount of soda and sports drinks you have every day. Drink more water when you are thirsty. ¨ Eat at least 5 servings of fruits and vegetables every day. It may seem like a lot, but it is not hard to reach this goal. A serving or helping is 1 piece of fruit, 1 cup of vegetables, or 2 cups of leafy, raw vegetables. Have an apple or some carrot sticks as an afternoon snack instead of a candy bar. Try to have fruits and/or vegetables at every meal.  · Make exercise part of your daily routine. You may want to start with simple activities, such as walking, bicycling, or slow swimming. Try to be active 30 to 60 minutes every day. You do not need to do all 30 to 60 minutes all at once. For example, you can exercise 3 times a day for 10 or 20 minutes.  Moderate exercise is safe for most people, but it is always a good idea to talk to your doctor before starting an exercise program.  · Keep moving. Bryan Yang the lawn, work in the garden, or [employer]. Take the stairs instead of the elevator at work. · If you smoke, quit. People who smoke have an increased risk for heart attack, stroke, cancer, and other lung illnesses. Quitting is hard, but there are ways to boost your chance of quitting tobacco for good. ¨ Use nicotine gum, patches, or lozenges. ¨ Ask your doctor about stop-smoking programs and medicines. ¨ Keep trying. In addition to reducing your risk of diseases in the future, you will notice some benefits soon after you stop using tobacco. If you have shortness of breath or asthma symptoms, they will likely get better within a few weeks after you quit. · Limit how much alcohol you drink. Moderate amounts of alcohol (up to 2 drinks a day for men, 1 drink a day for women) are okay. But drinking too much can lead to liver problems, high blood pressure, and other health problems. Family health  If you have a family, there are many things you can do together to improve your health. · Eat meals together as a family as often as possible. · Eat healthy foods. This includes fruits, vegetables, lean meats and dairy, and whole grains. · Include your family in your fitness plan. Most people think of activities such as jogging or tennis as the way to fitness, but there are many ways you and your family can be more active. Anything that makes you breathe hard and gets your heart pumping is exercise. Here are some tips:  ¨ Walk to do errands or to take your child to school or the bus. ¨ Go for a family bike ride after dinner instead of watching TV. Where can you learn more? Go to http://kelsie-kerwin.info/. Enter W331 in the search box to learn more about \"A Healthy Lifestyle: Care Instructions. \"  Current as of: July 26, 2016  Content Version: 11.3  © 4375-7746 Healthwise, Incorporated. Care instructions adapted under license by [employer] (which disclaims liability or warranty for this information). If you have questions about a medical condition or this instruction, always ask your healthcare professional. [address] any warranty or liability for your use of this information. Will recommend continuation of Exelon patch and stay as safely and reasonably busy as possible. Will think about the Namenda as a potential add on support drug in the meantime. Revisit 6 months.

## 2017-08-31 NOTE — PROGRESS NOTES
Neurology Consult      Subjective: Rhett Barfield is a 70 y.o. female who comes in with family and has moderate to severe dementia. Updated neuropsych testing in the winter confirmed the degree of impairment and thought it could be the last time she is tested. Found to be incompetent and even went so far as to say assisted living could be a next transition point. The family agrees at this point and could be close to that. Requires all assistance getting through the day dressing undressing bathing and will eat but has to be carefully watched as she will not pace herself and potentially in danger of choking. Overall very well mannered and cordial and not physical or agitated etc. made a trip to [place] to see the real like replica of [place] and apparently there was no contentious issues etc.  Did mention potentially the idea of adding the support drug Namenda to the Exelon patch. No pressure to consider this and the family will look up the medicine and see if there is any confidence in moving forward with this addition. Did not see any inherent changes in the patient and as usual is detached and no spontaneous communication. Would occasionally utter an unintelligible word or thought if she felt like she was being addressed. No new medical or surgical history offered and will suggest revisit in 6 months. Current Outpatient Prescriptions   Medication Sig Dispense Refill    benztropine (COGENTIN) 1 mg tablet TAKE ONE TABLET BY MOUTH TWICE A DAY. GENERIC FOR COGENTIN 60 Tab 5    simvastatin (ZOCOR) 40 mg tablet TAKE ONE TABLET BY MOUTH EVERY NIGHT AT BEDTIME 90 Tab 0    lisinopril (PRINIVIL, ZESTRIL) 5 mg tablet TAKE ONE TABLET BY MOUTH DAILY 90 Tab 0    rivastigmine (EXELON) 9.5 mg/24 hr patch APPLY ONE PATCH TO THE SKIN DAILY.  90 Patch 3    trifluoperazine (STELAZINE) 1 mg tablet TAKE ONE TABLET BY MOUTH TWICE A DAY *GENERIC FOR STELAZINE* 60 Tab 3      Allergies   Allergen Reactions    Exelon [Rivastigmine] Other (comments)     Past Medical History:   Diagnosis Date    Anxiety     Constipation     Cough     Degenerative joint disease of knee     both knees    Edema     Hypercholesterolemia     Hypertension     Memory loss     MR (mental retardation)     Snoring       Past Surgical History:   Procedure Laterality Date    COLONOSCOPY  2008    neg    HX BREAST BIOPSY Right 2012    neg; stereotactic bx    HX CATARACT REMOVAL        Social History     Social History    Marital status: SINGLE     Spouse name: N/A    Number of children: N/A    Years of education: N/A     Occupational History    Not on file. Social History Main Topics    Smoking status: Never Smoker    Smokeless tobacco: Never Used    Alcohol use No    Drug use: No    Sexual activity: Not Currently     Other Topics Concern    Not on file     Social History Narrative      Family History   Problem Relation Age of Onset    Heart Disease Mother     Hypertension Mother     Elevated Lipids Mother     Hypertension Sister    Emma Maynor Elevated Lipids Sister     Breast Cancer Sister 77    Elevated Lipids Brother     Hypertension Brother     Elevated Lipids Sister     Hypertension Sister       Visit Vitals    /68    Pulse 63    Temp 98.6 °F (37 °C) (Oral)    Resp 18    Ht 5' 5\" (1.651 m)    Wt 62.1 kg (137 lb)    SpO2 97%    BMI 22.8 kg/m2        Review of Systems:   A comprehensive review of systems was negative except for that written in the HPI. Neuro Exam:     Appearance: The patient is well developed, well nourished, and unable to provide a coherent history and is in no acute distress. Mental Status: Oriented to name only . Mood and affect appropriate to baseline which includes detachment and no spontaneous conversation. .   Cranial Nerves:   Intact visual fields? Fundi are benign. ARNALDO, EOM's full, no nystagmus, no ptosis. Facial sensation is normal. Corneal reflexes are intact.  Facial movement is symmetric. Hearing is normal bilaterally. Palate is midline with normal sternocleidomastoid and trapezius muscles are normal. Tongue is midline. Motor:  5/5 strength in upper and lower proximal and distal muscles. Normal bulk and tone. No fasciculations. Reflexes:   Deep tendon reflexes 2+/4 and symmetrical.   Sensory:   Normal to touch, pinprick and vibration. Gait:  Normal gait. Tremor:   No tremor noted. Cerebellar:  No cerebellar signs present. Neurovascular:  Normal heart sounds and regular rhythm, peripheral pulses intact, and no carotid bruits. Assessment:   Moderate to severe dementia. Went over updated neuropsych testing from February. Continues on the Exelon patch and tolerates it well. No new medical or surgical history and did propose adding the drug Namenda if parties are interested. Stay as reasonably and safely busy as possible. Please see progress notes. Plan:   Revisit 6 months.   Signed by :  Prachi Bowie MD

## 2017-09-02 DIAGNOSIS — E78.00 HYPERCHOLESTEREMIA: ICD-10-CM

## 2017-09-05 RX ORDER — SIMVASTATIN 40 MG/1
TABLET, FILM COATED ORAL
Qty: 90 TAB | Refills: 0 | Status: SHIPPED | OUTPATIENT
Start: 2017-09-05 | End: 2017-11-30 | Stop reason: SDUPTHER

## 2017-09-05 RX ORDER — LISINOPRIL 5 MG/1
TABLET ORAL
Qty: 90 TAB | Refills: 0 | Status: SHIPPED | OUTPATIENT
Start: 2017-09-05 | End: 2017-11-30 | Stop reason: SDUPTHER

## 2017-09-21 ENCOUNTER — OFFICE VISIT (OUTPATIENT)
Dept: FAMILY MEDICINE CLINIC | Age: 72
End: 2017-09-21

## 2017-09-21 VITALS
BODY MASS INDEX: 22.41 KG/M2 | HEART RATE: 66 BPM | RESPIRATION RATE: 18 BRPM | SYSTOLIC BLOOD PRESSURE: 146 MMHG | OXYGEN SATURATION: 97 % | WEIGHT: 134.5 LBS | HEIGHT: 65 IN | TEMPERATURE: 97.6 F | DIASTOLIC BLOOD PRESSURE: 87 MMHG

## 2017-09-21 DIAGNOSIS — Z23 ENCOUNTER FOR IMMUNIZATION: ICD-10-CM

## 2017-09-21 DIAGNOSIS — I10 ESSENTIAL HYPERTENSION: Primary | ICD-10-CM

## 2017-09-21 NOTE — MR AVS SNAPSHOT
Visit Information Date & Time Provider Department Dept. Phone Encounter #  
 9/21/2017  1:20 PM America Durham MD [facility] [phone] [id number] Follow-up Instructions Return in about 6 months (around 3/21/2018). Your Appointments 3/1/2018  3:40 PM  
Follow Up with Benjamín Caban MD  
[address] [facility] Appt Note: follow up dementia  $0CP  tae  8/31/17  
 [address] [address]  
[facility] [address] [address] Upcoming Health Maintenance Date Due DTaP/Tdap/Td series (1 - Tdap) 11/2/1966 Pneumococcal 65+ Low/Medium Risk (1 of 2 - PCV13) 11/2/2010 GLAUCOMA SCREENING Q2Y 7/17/2016 MEDICARE YEARLY EXAM 7/15/2017 INFLUENZA AGE 9 TO ADULT 8/1/2017 COLONOSCOPY 7/8/2018 BREAST CANCER SCRN MAMMOGRAM 8/11/2019 Allergies as of 9/21/2017  Review Complete On: 9/21/2017 By: America Durham MD  
  
 Severity Noted Reaction Type Reactions Exelon [Rivastigmine]  01/30/2014    Other (comments) Current Immunizations  Reviewed on 7/14/2016 Name Date Influenza High Dose Vaccine PF  Incomplete, 10/2/2014 Zoster Vaccine, Live 9/26/2013 Not reviewed this visit You Were Diagnosed With   
  
 Codes Comments Essential hypertension    -  Primary ICD-10-CM: I10 
ICD-9-CM: 401.9 Encounter for immunization     ICD-10-CM: S71 ICD-9-CM: V03.89 Vitals BP Pulse Temp Resp Height(growth percentile) Weight(growth percentile) 146/87 (BP 1 Location: Right arm, BP Patient Position: Sitting) 66 97.6 °F (36.4 °C) (Oral) 18 5' 5\" (1.651 m) 134 lb 8 oz (61 kg) SpO2 BMI OB Status Smoking Status 97% 22.38 kg/m2 Postmenopausal Never Smoker Vitals History BMI and BSA Data Body Mass Index Body Surface Area  
 22.38 kg/m 2 1.67 m 2 Preferred Pharmacy Pharmacy Name Phone Megha Solis [facility], [address] [phone] Your Updated Medication List  
  
   
This list is accurate as of: 9/21/17  2:02 PM.  Always use your most recent med list.  
  
  
  
  
 benztropine 1 mg tablet Commonly known as:  COGENTIN  
TAKE ONE TABLET BY MOUTH TWICE A DAY. GENERIC FOR COGENTIN  
  
 lisinopril 5 mg tablet Commonly known as:  PRINIVIL, ZESTRIL  
TAKE ONE TABLET BY MOUTH DAILY  
  
 rivastigmine 9.5 mg/24 hr patch Commonly known as:  EXELON  
APPLY ONE PATCH TO THE SKIN DAILY. simvastatin 40 mg tablet Commonly known as:  ZOCOR  
TAKE ONE TABLET BY MOUTH EVERY NIGHT AT BEDTIME  
  
 trifluoperazine 1 mg tablet Commonly known as:  STELAZINE  
TAKE ONE TABLET BY MOUTH TWICE A DAY *GENERIC FOR STELAZINE* We Performed the Following ADMIN INFLUENZA VIRUS VAC [ HCP] INFLUENZA VIRUS VACCINE, HIGH DOSE SEASONAL, PRESERVATIVE FREE [00504 CPT(R)] Follow-up Instructions Return in about 6 months (around 3/21/2018). Introducing [facility]! [facility] introduces Tripnary patient portal. Now you can access parts of your medical record, email your doctor's office, and request medication refills online. 1. In your internet browser, go to [email or web address] 2. Click on the First Time User? Click Here link in the Sign In box. You will see the New Member Sign Up page. 3. Enter your Tripnary Access Code exactly as it appears below. You will not need to use this code after youve completed the sign-up process. If you do not sign up before the expiration date, you must request a new code. · Tripnary Access Code: [email or web address] Expires: 10/9/2017 12:08 PM 
 
4. Enter the last four digits of your Social Security Number (xxxx) and Date of Birth (mm/dd/yyyy) as indicated and click Submit. You will be taken to the next sign-up page. 5. Create a Hypereight ID. This will be your Hypereight login ID and cannot be changed, so think of one that is secure and easy to remember. 6. Create a Hypereight password. You can change your password at any time. 7. Enter your Password Reset Question and Answer. This can be used at a later time if you forget your password. 8. Enter your e-mail address. You will receive e-mail notification when new information is available in [address] 9. Click Sign Up. You can now view and download portions of your medical record. 10. Click the Download Summary menu link to download a portable copy of your medical information. If you have questions, please visit the Frequently Asked Questions section of the Hypereight website. Remember, Hypereight is NOT to be used for urgent needs. For medical emergencies, dial 911. Now available from your iPhone and Android! Please provide this summary of care documentation to your next provider. Your primary care clinician is listed as EARL SERRATO. If you have any questions after today's visit, please call [phone].

## 2017-09-21 NOTE — PROGRESS NOTES
1. Have you been to the ER, urgent care clinic since your last visit? Hospitalized since your last visit? No    2. Have you seen or consulted any other health care providers outside of the [address] [place] [address] since your last visit? Include any pap smears or colon screening. No   Chief Complaint   Patient presents with    Hypertension     6 mos fuv        Chief Complaint   Patient presents with    Hypertension     6 mos fuv      She is a 70 y.o. female who presents for evalution. Reviewed PmHx, RxHx, FmHx, SocHx, AllgHx and updated and dated in the chart. Patient Active Problem List    Diagnosis    Advance care planning     POA      Alzheimer's disease    Syncope and collapse    Anxiety    Edema    Snoring    Cough    Constipation    Memory loss    Dementia in conditions classified elsewhere without behavioral disturbance    Schizophrenia ([facility] [address]    Mental retardation    Hypercholesteremia    HTN (hypertension)    Mental disability    DJD (degenerative joint disease)       Review of Systems - negative except as listed above in the HPI    Objective:     Vitals:    09/21/17 1342   BP: 146/87   Pulse: 66   Resp: 18   Temp: 97.6 °F (36.4 °C)   TempSrc: Oral   SpO2: 97%   Weight: 134 lb 8 oz (61 kg)   Height: 5' 5\" (1.651 m)     Physical Examination: General appearance - alert, well appearing, and in no distress  Neck - supple, no significant adenopathy  Chest - clear to auscultation, no wheezes, rales or rhonchi, symmetric air entry  Heart - normal rate, regular rhythm, normal S1, S2, no murmurs, rubs, clicks or gallops    Assessment/ Plan:   Diagnoses and all orders for this visit:    1. Essential hypertension  -ok for pt    2. Encounter for immunization  -     Influenza virus vaccine ([address]) 72 years and older ([address])  -     Administration fee () for Medicare insured patients       Follow-up Disposition:  Return in about 6 months (around 3/21/2018).     I have discussed the diagnosis with the patient and the intended plan as seen in the above orders. The patient understands and agrees with the plan. The patient has received an after-visit summary and questions were answered concerning future plans. Medication Side Effects and Warnings were discussed with patient  Patient Labs were reviewed and or requested:  Patient Past Records were reviewed and or requested    Lois Rai M.D. There are no Patient Instructions on file for this visit.

## 2017-10-09 RX ORDER — BENZTROPINE MESYLATE 1 MG/1
TABLET ORAL
Qty: 180 TAB | Refills: 2 | Status: SHIPPED | OUTPATIENT
Start: 2017-10-09 | End: 2018-03-08 | Stop reason: SDUPTHER

## 2017-10-09 RX ORDER — TRIFLUOPERAZINE HYDROCHLORIDE 1 MG/1
TABLET, FILM COATED ORAL
Qty: 180 TAB | Refills: 2 | Status: SHIPPED | OUTPATIENT
Start: 2017-10-09 | End: 2018-03-08 | Stop reason: SDUPTHER

## 2017-11-30 DIAGNOSIS — E78.00 HYPERCHOLESTEREMIA: ICD-10-CM

## 2017-11-30 RX ORDER — LISINOPRIL 5 MG/1
TABLET ORAL
Qty: 90 TAB | Refills: 0 | Status: SHIPPED | COMMUNITY
Start: 2017-11-30 | End: 2018-03-08 | Stop reason: SDUPTHER

## 2017-12-01 RX ORDER — SIMVASTATIN 40 MG/1
TABLET, FILM COATED ORAL
Qty: 90 TAB | Refills: 0 | Status: SHIPPED | OUTPATIENT
Start: 2017-12-01 | End: 2018-03-08 | Stop reason: SDUPTHER

## 2018-03-01 ENCOUNTER — OFFICE VISIT (OUTPATIENT)
Dept: NEUROLOGY | Age: 73
End: 2018-03-01

## 2018-03-01 VITALS
TEMPERATURE: 98.3 F | HEIGHT: 65 IN | SYSTOLIC BLOOD PRESSURE: 140 MMHG | DIASTOLIC BLOOD PRESSURE: 75 MMHG | RESPIRATION RATE: 16 BRPM | OXYGEN SATURATION: 97 % | HEART RATE: 64 BPM | WEIGHT: 128 LBS | BODY MASS INDEX: 21.33 KG/M2

## 2018-03-01 DIAGNOSIS — F03.90 DEMENTIA WITHOUT BEHAVIORAL DISTURBANCE, UNSPECIFIED DEMENTIA TYPE: Primary | ICD-10-CM

## 2018-03-01 NOTE — PROGRESS NOTES
Neurology Consult      Subjective: Paty Pitt is a 67 y.o. female who comes in with her family and unfortunately 1 of the family members  very recently and quite quickly. 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 Fortunately was not in any discomfort and quality of life was not impaired up until the last several days of life. Quite a remarkable story all things considered. Patient is doing baseline and caregiver has noticed she has lost a little bit of weight but will monitor that and is getting nutritional supplement. Is on the Exelon patch and otherwise tolerating it and I was thinking about adding Namenda but in light of the recent developments that makes no sense. I think it is much more appropriate to give her 6 months as she is affected in this grieving process and the individual involved was very much in her supervision and care. Patient looks baseline to me today and will suggest revisit in 6 months. Current Outpatient Prescriptions   Medication Sig Dispense Refill    simvastatin (ZOCOR) 40 mg tablet TAKE ONE TABLET BY MOUTH EVERY NIGHT AT BEDTIME 90 Tab 0    lisinopril (PRINIVIL, ZESTRIL) 5 mg tablet TAKE ONE TABLET BY MOUTH DAILY 90 Tab 0    trifluoperazine (STELAZINE) 1 mg tablet TAKE ONE TABLET BY MOUTH TWICE A DAY *GENERIC FOR STELAZINE* 180 Tab 2    benztropine (COGENTIN) 1 mg tablet TAKE ONE TABLET BY MOUTH TWICE A DAY. GENERIC FOR COGENTIN 180 Tab 2    rivastigmine (EXELON) 9.5 mg/24 hr patch APPLY ONE PATCH TO THE SKIN DAILY.  90 Patch 3      Allergies   Allergen Reactions    Exelon [Rivastigmine] Other (comments)     Past Medical History:   Diagnosis Date    Anxiety     Constipation     Cough     Degenerative joint disease of knee     both knees    Edema     Hypercholesterolemia     Hypertension     Memory loss     MR (mental retardation)  Snoring       Past Surgical History:   Procedure Laterality Date    COLONOSCOPY  2008    neg    HX BREAST BIOPSY Right 2012    neg; stereotactic bx    HX CATARACT REMOVAL        Social History     Social History    Marital status: SINGLE     Spouse name: N/A    Number of children: N/A    Years of education: N/A     Occupational History    Not on file. Social History Main Topics    Smoking status: Never Smoker    Smokeless tobacco: Never Used    Alcohol use No    Drug use: No    Sexual activity: Not Currently     Other Topics Concern    Not on file     Social History Narrative      Family History   Problem Relation Age of Onset    Heart Disease Mother     Hypertension Mother     Elevated Lipids Mother     Hypertension Sister    [place] Elevated Lipids Sister     Breast Cancer Sister 77    Elevated Lipids Brother     Hypertension Brother     Elevated Lipids Sister     Hypertension Sister       Visit Vitals    /75    Pulse 64    Temp 98.3 °F (36.8 °C) (Oral)    Resp 16    Ht 5' 5\" (1.651 m)    Wt 58.1 kg (128 lb)    SpO2 97%    BMI 21.3 kg/m2        Review of Systems:   A comprehensive review of systems was negative except for that written in the HPI. Neuro Exam:     Appearance: The patient is well developed, well nourished, and unable to provide a coherent history and is in no acute distress. Mental Status: Oriented to name and only partial circumstances. Mood and affect appropriate to baseline which includes basically nonverbal and detached but cordial and no complaints. Cranial Nerves:   Intact visual fields. Fundi are benign. ARNALDO, EOM's full, no nystagmus, no ptosis. Facial sensation is normal. Corneal reflexes are intact. Facial movement is symmetric. Hearing is normal bilaterally. Palate is midline with normal sternocleidomastoid and trapezius muscles are normal. Tongue is midline. Motor:  5/5 strength in upper and lower proximal and distal muscles.  Normal bulk and tone. No fasciculations. Reflexes:   Deep tendon reflexes 2+/4 and symmetrical.   Sensory:   Normal to touch, pinprick and vibration. Gait:  Normal gait. Tremor:   No tremor noted. Cerebellar:  No cerebellar signs present. Neurovascular:  Normal heart sounds and regular rhythm, peripheral pulses intact, and no carotid bruits. Assessment:   Moderate plus grade dementia. Will make any adjustments in medicine no sooner than 6 months from now. This is a very awkward and artificial time to consider that in lieu of the departure of the patient's near kin. Could consider adding the Namenda at that time if appropriate to the Exelon patch. Continue to watch weight and caloric intake etc.      Plan:   Revisit 6 months.   Signed by :  Gerardo Alex MD

## 2018-03-01 NOTE — PATIENT INSTRUCTIONS
[facility]   Statement to Patients  April 1, 2014      In an effort to ensure the large volume of patient prescription refills is processed in the most efficient and expeditious manner, we are asking our patients to assist us by calling your Pharmacy for all prescription refills, this will include also your  Mail Order Pharmacy. The pharmacy will contact our office electronically to continue the refill process. Please do not wait until the last minute to call your pharmacy. We need at least 48 hours (2days) to fill prescriptions. We also encourage you to call your pharmacy before going to  your prescription to make sure it is ready. With regard to controlled substance prescription refill requests (narcotic refills) that need to be picked up at our office, we ask your cooperation by providing us with at least 72 hours (3days) notice that you will need a refill. We will not refill narcotic prescription refill requests after 4:00pm on any weekday, Monday through Thursday, or after 2:00pm on Fridays, or on the weekends. We encourage everyone to explore another way of getting your prescription refill request processed using [employer], our patient web portal through our electronic medical record system. [employer] is an efficient and effective way to communicate your medication request directly to the office and  downloadable as an amador on your smart phone . [employer] also features a review functionality that allows you to view your medication list as well as leave messages for your physician. Are you ready to get connected? If so please review the attatched instructions or speak to any of our staff to get you set up right away! Thank you so much for your cooperation. Should you have any questions please contact our Practice Administrator.     The Physicians and Staff,  [facility]              A Healthy Lifestyle: Care Instructions  Your Care Instructions    A healthy lifestyle can help you feel good, stay at a healthy weight, and have plenty of energy for both work and play. A healthy lifestyle is something you can share with your whole family. A healthy lifestyle also can lower your risk for serious health problems, such as high blood pressure, heart disease, and diabetes. You can follow a few steps listed below to improve your health and the health of your family. Follow-up care is a key part of your treatment and safety. Be sure to make and go to all appointments, and call your doctor if you are having problems. It's also a good idea to know your test results and keep a list of the medicines you take. How can you care for yourself at home? · Do not eat too much sugar, fat, or fast foods. You can still have dessert and treats now and then. The goal is moderation. · Start small to improve your eating habits. Pay attention to portion sizes, drink less juice and soda pop, and eat more fruits and vegetables. ¨ Eat a healthy amount of food. A 3-ounce serving of meat, for example, is about the size of a deck of cards. Fill the rest of your plate with vegetables and whole grains. ¨ Limit the amount of soda and sports drinks you have every day. Drink more water when you are thirsty. ¨ Eat at least 5 servings of fruits and vegetables every day. It may seem like a lot, but it is not hard to reach this goal. A serving or helping is 1 piece of fruit, 1 cup of vegetables, or 2 cups of leafy, raw vegetables. Have an apple or some carrot sticks as an afternoon snack instead of a candy bar. Try to have fruits and/or vegetables at every meal.  · Make exercise part of your daily routine. You may want to start with simple activities, such as walking, bicycling, or slow swimming. Try to be active 30 to 60 minutes every day. You do not need to do all 30 to 60 minutes all at once. For example, you can exercise 3 times a day for 10 or 20 minutes.  Moderate exercise is safe for most people, but it is always a good idea to talk to your doctor before starting an exercise program.  · Keep moving. Amaya Harpin the lawn, work in the garden, or [place]. Take the stairs instead of the elevator at work. · If you smoke, quit. People who smoke have an increased risk for heart attack, stroke, cancer, and other lung illnesses. Quitting is hard, but there are ways to boost your chance of quitting tobacco for good. ¨ Use nicotine gum, patches, or lozenges. ¨ Ask your doctor about stop-smoking programs and medicines. ¨ Keep trying. In addition to reducing your risk of diseases in the future, you will notice some benefits soon after you stop using tobacco. If you have shortness of breath or asthma symptoms, they will likely get better within a few weeks after you quit. · Limit how much alcohol you drink. Moderate amounts of alcohol (up to 2 drinks a day for men, 1 drink a day for women) are okay. But drinking too much can lead to liver problems, high blood pressure, and other health problems. Family health  If you have a family, there are many things you can do together to improve your health. · Eat meals together as a family as often as possible. · Eat healthy foods. This includes fruits, vegetables, lean meats and dairy, and whole grains. · Include your family in your fitness plan. Most people think of activities such as jogging or tennis as the way to fitness, but there are many ways you and your family can be more active. Anything that makes you breathe hard and gets your heart pumping is exercise. Here are some tips:  ¨ Walk to do errands or to take your child to school or the bus. ¨ Go for a family bike ride after dinner instead of watching TV. Where can you learn more? Go to http://kelsie-kerwin.info/. Enter Y737 in the search box to learn more about \"A Healthy Lifestyle: Care Instructions. \"  Current as of:  May 12, 2017  Content Version: 11.4  © 3719-0994 Healthwise, Incorporated. Care instructions adapted under license by [employer] (which disclaims liability or warranty for this information). If you have questions about a medical condition or this instruction, always ask your healthcare professional. [place] [address] any warranty or liability for your use of this information. Patient history reviewed and patient examined. Will not make any adjustments in medicine as recent events would dictate otherwise. Continue to watch weight and nutritional status and will reinvestigate the issue of medicines in 6 months. Hopefully everything will be settled at that point. My condolences.

## 2018-03-01 NOTE — PROGRESS NOTES
Reviewed record in preparation for visit and have necessary documentation  Pt did not bring medication to office visit for review   Advanced Directives, Living Will on file  opportunity was given for questions

## 2018-03-01 NOTE — MR AVS SNAPSHOT
[address] [address] 
[phone] Patient: Delphine Patino MRN: [id number] MGO:[birth year] Visit Information Date & Time Provider Department Dept. Phone Encounter #  
 3/1/2018  3:40 PM Sylvester Nowak  [facility] [phone] [id number] Follow-up Instructions Return in about 6 months (around 9/1/2018). Your Appointments 3/8/2018  8:50 AM  
ESTABLISHED PATIENT with Krishan Kong MD  
[address]) Appt Note: 6 months fuv  
 [address] [place] [address] [address] [address]  
[phone]  
  
   
 [address] [place] [address] [id number] [place] [address] Road [address]  
  
    
 9/6/2018  2:40 PM  
Follow Up with Sylvester Nowak MD  
[address] [place] [address] Neurology Clinic [address] [place] [address]) Appt Note: follow up dementia  $0CP  tae  3/1/18  
 [address] [place] [phone] [facility] [address]  
[facility] [id number] [place] [id number] Upcoming Health Maintenance Date Due DTaP/Tdap/Td series (1 - Tdap) 11/2/1966 Pneumococcal 65+ Low/Medium Risk (1 of 2 - PCV13) 11/2/2010 GLAUCOMA SCREENING Q2Y 7/17/2016 MEDICARE YEARLY EXAM 7/15/2017 COLONOSCOPY 7/8/2018 BREAST CANCER SCRN MAMMOGRAM 8/11/2019 Allergies as of 3/1/2018  Review Complete On: 3/1/2018 By: Sylvester Nowak MD  
  
 Severity Noted Reaction Type Reactions Exelon [Rivastigmine]  01/30/2014    Other (comments) Current Immunizations  Reviewed on 7/14/2016 Name Date Influenza High Dose Vaccine PF 9/21/2017, 10/2/2014 Zoster Vaccine, Live 9/26/2013 Not reviewed this visit You Were Diagnosed With   
  
 Codes Comments Dementia without behavioral disturbance, unspecified dementia type    -  Primary ICD-10-CM: F03.90 ICD-9-CM: 294.20 Vitals BP Pulse Temp Resp Height(growth percentile) Weight(growth percentile) 140/75 64 98.3 °F (36.8 °C) (Oral) 16 5' 5\" (1.651 m) 128 lb (58.1 kg) SpO2 BMI OB Status Smoking Status 97% 21.3 kg/m2 Postmenopausal Never Smoker Vitals History BMI and BSA Data Body Mass Index Body Surface Area  
 21.3 kg/m 2 1.63 m 2 Preferred Pharmacy Pharmacy Name Phone Nicola Rivera [facility] [facility], [address] [phone] Your Updated Medication List  
  
   
This list is accurate as of 3/1/18  3:58 PM.  Always use your most recent med list.  
  
  
  
  
 benztropine 1 mg tablet Commonly known as:  COGENTIN  
TAKE ONE TABLET BY MOUTH TWICE A DAY. GENERIC FOR COGENTIN  
  
 lisinopril 5 mg tablet Commonly known as:  PRINIVIL, ZESTRIL  
TAKE ONE TABLET BY MOUTH DAILY  
  
 rivastigmine 9.5 mg/24 hr patch Commonly known as:  EXELON  
APPLY ONE PATCH TO THE SKIN DAILY. simvastatin 40 mg tablet Commonly known as:  ZOCOR  
TAKE ONE TABLET BY MOUTH EVERY NIGHT AT BEDTIME  
  
 trifluoperazine 1 mg tablet Commonly known as:  STELAZINE  
TAKE ONE TABLET BY MOUTH TWICE A DAY *GENERIC FOR STELAZINE* Follow-up Instructions Return in about 6 months (around 9/1/2018). Patient Instructions PRESCRIPTION REFILL POLICY [facility] Statement to Patients April 1, 2014 In an effort to ensure the large volume of patient prescription refills is processed in the most efficient and expeditious manner, we are asking our patients to assist us by calling your Pharmacy for all prescription refills, this will include also your  Mail Order Pharmacy. The pharmacy will contact our office electronically to continue the refill process. Please do not wait until the last minute to call your pharmacy. We need at least 48 hours (2days) to fill prescriptions. We also encourage you to call your pharmacy before going to  your prescription to make sure it is ready. With regard to controlled substance prescription refill requests (narcotic refills) that need to be picked up at our office, we ask your cooperation by providing us with at least 72 hours (3days) notice that you will need a refill. We will not refill narcotic prescription refill requests after 4:00pm on any weekday, Monday through Thursday, or after 2:00pm on Fridays, or on the weekends. We encourage everyone to explore another way of getting your prescription refill request processed using [employer], our patient web portal through our electronic medical record system. [employer] is an efficient and effective way to communicate your medication request directly to the office and  downloadable as an amador on your smart phone . [employer] also features a review functionality that allows you to view your medication list as well as leave messages for your physician. Are you ready to get connected? If so please review the attatched instructions or speak to any of our staff to get you set up right away! Thank you so much for your cooperation. Should you have any questions please contact our Practice Administrator. The Physicians and Staff,  [facility] A Healthy Lifestyle: Care Instructions Your Care Instructions A healthy lifestyle can help you feel good, stay at a healthy weight, and have plenty of energy for both work and play. A healthy lifestyle is something you can share with your whole family. A healthy lifestyle also can lower your risk for serious health problems, such as high blood pressure, heart disease, and diabetes. You can follow a few steps listed below to improve your health and the health of your family. Follow-up care is a key part of your treatment and safety. Be sure to make and go to all appointments, and call your doctor if you are having problems. It's also a good idea to know your test results and keep a list of the medicines you take. How can you care for yourself at home? · Do not eat too much sugar, fat, or fast foods. You can still have dessert and treats now and then. The goal is moderation. · Start small to improve your eating habits. Pay attention to portion sizes, drink less juice and soda pop, and eat more fruits and vegetables. ¨ Eat a healthy amount of food. A 3-ounce serving of meat, for example, is about the size of a deck of cards. Fill the rest of your plate with vegetables and whole grains. ¨ Limit the amount of soda and sports drinks you have every day. Drink more water when you are thirsty. ¨ Eat at least 5 servings of fruits and vegetables every day. It may seem like a lot, but it is not hard to reach this goal. A serving or helping is 1 piece of fruit, 1 cup of vegetables, or 2 cups of leafy, raw vegetables. Have an apple or some carrot sticks as an afternoon snack instead of a candy bar. Try to have fruits and/or vegetables at every meal. 
· Make exercise part of your daily routine. You may want to start with simple activities, such as walking, bicycling, or slow swimming. Try to be active 30 to 60 minutes every day. You do not need to do all 30 to 60 minutes all at once. For example, you can exercise 3 times a day for 10 or 20 minutes. Moderate exercise is safe for most people, but it is always a good idea to talk to your doctor before starting an exercise program. 
· Keep moving. Duong Vazquezor the lawn, work in the garden, or [employer]. Take the stairs instead of the elevator at work. · If you smoke, quit. People who smoke have an increased risk for heart attack, stroke, cancer, and other lung illnesses. Quitting is hard, but there are ways to boost your chance of quitting tobacco for good. ¨ Use nicotine gum, patches, or lozenges. ¨ Ask your doctor about stop-smoking programs and medicines. ¨ Keep trying.  
In addition to reducing your risk of diseases in the future, you will notice some benefits soon after you stop using tobacco. If you have shortness of breath or asthma symptoms, they will likely get better within a few weeks after you quit. · Limit how much alcohol you drink. Moderate amounts of alcohol (up to 2 drinks a day for men, 1 drink a day for women) are okay. But drinking too much can lead to liver problems, high blood pressure, and other health problems. Family health If you have a family, there are many things you can do together to improve your health. · Eat meals together as a family as often as possible. · Eat healthy foods. This includes fruits, vegetables, lean meats and dairy, and whole grains. · Include your family in your fitness plan. Most people think of activities such as jogging or tennis as the way to fitness, but there are many ways you and your family can be more active. Anything that makes you breathe hard and gets your heart pumping is exercise. Here are some tips: 
¨ Walk to do errands or to take your child to school or the bus. ¨ Go for a family bike ride after dinner instead of watching TV. Where can you learn more? Go to http://kelsieAppVaultkerwin.info/. Enter J139 in the search box to learn more about \"A Healthy Lifestyle: Care Instructions. \" Current as of: May 12, 2017 Content Version: 11.4 © 7554-4448 [employer]. Care instructions adapted under license by [employer] (which disclaims liability or warranty for this information). If you have questions about a medical condition or this instruction, always ask your healthcare professional. [place] [address] any warranty or liability for your use of this information. Patient history reviewed and patient examined. Will not make any adjustments in medicine as recent events would dictate otherwise.   Continue to watch weight and nutritional status and will reinvestigate the issue of medicines in 6 months. Hopefully everything will be settled at that point. My condolences. Introducing [facility]! [employer] introduces [employer] patient portal. Now you can access parts of your medical record, email your doctor's office, and request medication refills online. 1. In your internet browser, go to [email or web address] 2. Click on the First Time User? Click Here link in the Sign In box. You will see the New Member Sign Up page. 3. Enter your [employer] Access Code exactly as it appears below. You will not need to use this code after youve completed the sign-up process. If you do not sign up before the expiration date, you must request a new code. · [employer] Access Code: [id number] Nik[address] Fang Expires: 5/30/2018  3:09 PM 
 
4. Enter the last four digits of your Social Security Number (xxxx) and Date of Birth (mm/dd/yyyy) as indicated and click Submit. You will be taken to the next sign-up page. 5. Create a [employer] ID. This will be your [employer] login ID and cannot be changed, so think of one that is secure and easy to remember. 6. Create a [employer] password. You can change your password at any time. 7. Enter your Password Reset Question and Answer. This can be used at a later time if you forget your password. 8. Enter your e-mail address. You will receive e-mail notification when new information is available in [address] 9. Click Sign Up. You can now view and download portions of your medical record. 10. Click the Download Summary menu link to download a portable copy of your medical information. If you have questions, please visit the Frequently Asked Questions section of the [employer] website. Remember, [employer] is NOT to be used for urgent needs. For medical emergencies, dial 911. Now available from your iPhone and Android! Please provide this summary of care documentation to your next provider. Your primary care clinician is listed as EARL SERRATO. If you have any questions after today's visit, please call [phone].

## 2018-03-08 ENCOUNTER — HOSPITAL ENCOUNTER (OUTPATIENT)
Dept: LAB | Age: 73
Discharge: HOME | End: 2018-03-08
Payer: MEDICARE

## 2018-03-08 ENCOUNTER — OFFICE VISIT (OUTPATIENT)
Dept: FAMILY MEDICINE CLINIC | Age: 73
End: 2018-03-08

## 2018-03-08 VITALS
HEIGHT: 65 IN | OXYGEN SATURATION: 98 % | HEART RATE: 66 BPM | BODY MASS INDEX: 21.36 KG/M2 | RESPIRATION RATE: 20 BRPM | TEMPERATURE: 97.8 F | DIASTOLIC BLOOD PRESSURE: 66 MMHG | SYSTOLIC BLOOD PRESSURE: 123 MMHG | WEIGHT: 128.19 LBS

## 2018-03-08 DIAGNOSIS — F20.5 RESIDUAL SCHIZOPHRENIA (HCC): ICD-10-CM

## 2018-03-08 DIAGNOSIS — G30.8 ALZHEIMER'S DISEASE OF OTHER ONSET WITHOUT BEHAVIORAL DISTURBANCE: ICD-10-CM

## 2018-03-08 DIAGNOSIS — E78.00 HYPERCHOLESTEREMIA: ICD-10-CM

## 2018-03-08 DIAGNOSIS — I10 ESSENTIAL HYPERTENSION: ICD-10-CM

## 2018-03-08 DIAGNOSIS — R73.9 ELEVATED BLOOD SUGAR: ICD-10-CM

## 2018-03-08 DIAGNOSIS — F02.80 ALZHEIMER'S DISEASE OF OTHER ONSET WITHOUT BEHAVIORAL DISTURBANCE: ICD-10-CM

## 2018-03-08 DIAGNOSIS — Z00.00 ROUTINE GENERAL MEDICAL EXAMINATION AT A HEALTH CARE FACILITY: Primary | ICD-10-CM

## 2018-03-08 DIAGNOSIS — Z71.89 ADVANCE CARE PLANNING: ICD-10-CM

## 2018-03-08 PROCEDURE — 80061 LIPID PANEL: CPT

## 2018-03-08 PROCEDURE — 83036 HEMOGLOBIN GLYCOSYLATED A1C: CPT

## 2018-03-08 PROCEDURE — 80053 COMPREHEN METABOLIC PANEL: CPT

## 2018-03-08 RX ORDER — TRIFLUOPERAZINE HYDROCHLORIDE 1 MG/1
TABLET, FILM COATED ORAL
Qty: 180 TAB | Refills: 2 | Status: SHIPPED | OUTPATIENT
Start: 2018-03-08 | End: 2018-09-17 | Stop reason: SDUPTHER

## 2018-03-08 RX ORDER — RIVASTIGMINE 9.5 MG/24H
PATCH, EXTENDED RELEASE TRANSDERMAL
Qty: 90 PATCH | Refills: 3 | Status: SHIPPED | OUTPATIENT
Start: 2018-03-08 | End: 2018-09-17 | Stop reason: SDUPTHER

## 2018-03-08 RX ORDER — LISINOPRIL 5 MG/1
TABLET ORAL
Qty: 90 TAB | Refills: 1 | Status: SHIPPED | OUTPATIENT
Start: 2018-03-08 | End: 2018-09-02 | Stop reason: SDUPTHER

## 2018-03-08 RX ORDER — BENZTROPINE MESYLATE 1 MG/1
TABLET ORAL
Qty: 180 TAB | Refills: 2 | Status: SHIPPED | OUTPATIENT
Start: 2018-03-08 | End: 2018-09-17 | Stop reason: SDUPTHER

## 2018-03-08 RX ORDER — SIMVASTATIN 40 MG/1
TABLET, FILM COATED ORAL
Qty: 90 TAB | Refills: 1 | Status: SHIPPED | OUTPATIENT
Start: 2018-03-08 | End: 2018-09-02 | Stop reason: SDUPTHER

## 2018-03-08 NOTE — PROGRESS NOTES
1. Have you been to the ER, urgent care clinic since your last visit? Hospitalized since your last visit? No    2. Have you seen or consulted any other health care providers outside of the [address] [place] [address] since your last visit? Include any pap smears or colon screening. No   Chief Complaint   Patient presents with    Hypertension     6mos fuv hypertension    Annual Wellness Visit     AWV    Medication Refill     pt needs 90 day supply       Medicare Wellness Exam:    Chief Complaint   Patient presents with    Hypertension     6mos fuv hypertension    Annual Wellness Visit     AWV    Medication Refill     pt needs 90 day supply     she is a 67y.o. year old female who presents for evaluation for their Medicare Wellness Visit. Aldean Chandler is completed and assessed=yes  Depression Screen is completed and assessed=yes  Medication list reviewed and adjusted for accuracy=yes  Immunizations reviewed and updated=yes  Health/Preventative Screenings reviewed and updated=yes  ADL Functions reviewed=yes    Patient Active Problem List    Diagnosis    Advance care planning     POA      Alzheimer's disease    Syncope and collapse    Anxiety    Edema    Snoring    Cough    Constipation    Memory loss    Dementia in conditions classified elsewhere without behavioral disturbance    Schizophrenia ([facility] [address]    Mental retardation    Hypercholesteremia    HTN (hypertension)    Mental disability    DJD (degenerative joint disease)       Reviewed PmHx, RxHx, FmHx, SocHx, AllgHx and updated and dated in the chart.     Review of Systems - negative except as listed above in the HPI    Objective:     Vitals:    03/08/18 0850   BP: 123/66   Pulse: 66   Resp: 20   Temp: 97.8 °F (36.6 °C)   TempSrc: Oral   SpO2: 98%   Weight: 128 lb 3 oz (58.1 kg)   Height: 5' 5\" (1.651 m)     Physical Examination: General appearance - alert, well appearing, and in no distress  Neck - supple, no significant adenopathy  Chest - clear to auscultation, no wheezes, rales or rhonchi, symmetric air entry  Heart - normal rate, regular rhythm, normal S1, S2, no murmurs, rubs, clicks or gallops  Abdomen - soft, nontender, nondistended, no masses or organomegaly  Extremities - peripheral pulses normal, no pedal edema, no clubbing or cyanosis    Assessment/ Plan:   Diagnoses and all orders for this visit:    1. Routine general medical examination at a health care facility  -see below    2. Elevated blood sugar  -     METABOLIC PANEL, COMPREHENSIVE  -     HEMOGLOBIN A1C WITH EAG    3. Hypercholesteremia  -     simvastatin (ZOCOR) 40 mg tablet; TAKE ONE TABLET BY MOUTH EVERY NIGHT AT BEDTIME  -     LIPID PANEL  -     METABOLIC PANEL, COMPREHENSIVE    4. Essential hypertension  -     lisinopril (PRINIVIL, ZESTRIL) 5 mg tablet; TAKE ONE TABLET BY MOUTH DAILY  -     LIPID PANEL  -     METABOLIC PANEL, COMPREHENSIVE    5. Residual schizophrenia (HCC)  -stable    6. Alzheimer's disease of other onset without behavioral disturbance  -stble    7. Advance care planning  I discussed with patient living will and gave a legal form for patient to fill out and bring back to the office. Other orders  -     trifluoperazine (STELAZINE) 1 mg tablet; TAKE ONE TABLET BY MOUTH TWICE A DAY *GENERIC FOR STELAZINE*  -     benztropine (COGENTIN) 1 mg tablet; TAKE ONE TABLET BY MOUTH TWICE A DAY. GENERIC FOR COGENTIN  -     rivastigmine (EXELON) 9.5 mg/24 hr patch; APPLY ONE PATCH TO THE SKIN DAILY.          -Pain evaluation performed in office  -Cognitive Screen performed in office=see above  -Depression Screen, Fall risks (by up and go test)  and ADL functionality were addressed=uses walker  -Medication list updated and reviewed for any changes   -A comprehensive review of medical issues and a plan was formulated  -End of life planning was addressed with pt   -Health Screenings for preventions were addressed and a plan was formulated  -Shingles Vaccine was recommended  -Discussed with patient cancer risk factors and appropriate screenings for age  -Patient evaluated for colonoscopy and referred if needed per screeing criteria  -Labs from previous visits were discussed with patient   -Discussed with patient diet and exercise and formulated a plan as needed  -An Advanced care plan was developed with the patient.  -Alcohol screening performed and was negative    -Follow-up Disposition:  Return in about 6 months (around 9/8/2018). I have discussed the diagnosis with the patient and the intended plan as seen in the above orders. The patient understands and agrees with the plan. The patient has received an after-visit summary and questions were answered concerning future plans. Medication Side Effects and Warnings were discussed with patien  Patient Labs were reviewed and or requested  Patient Past Records were reviewed and or requested    There are no Patient Instructions on file for this visit.       Camilla Henderson M.D.

## 2018-03-08 NOTE — LETTER
3/13/2018 7:28 AM 
 
Ms. Alissa [facility] [address] Dear Alissa Salgado: 
 
Please find your most recent results below. Resulted Orders LIPID PANEL Result Value Ref Range Cholesterol, total 152 100 - 199 mg/dL Triglyceride 92 0 - 149 mg/dL HDL Cholesterol 50 >39 mg/dL VLDL, calculated 18 5 - 40 mg/dL LDL, calculated 84 0 - 99 mg/dL Narrative Performed at:  [address]  [id number] : Juan De La Cruz MD, Phone:  [phone] METABOLIC PANEL, COMPREHENSIVE Result Value Ref Range Glucose 95 65 - 99 mg/dL BUN 16 8 - 27 mg/dL Creatinine 0.99 0.57 - 1.00 mg/dL GFR est non-AA 57 (L) >59 mL/min/1.73 GFR est AA 66 >59 mL/min/1.73  
 BUN/Creatinine ratio 16 12 - 28 Sodium 141 134 - 144 mmol/L Potassium 4.8 3.5 - 5.2 mmol/L Chloride 100 96 - 106 mmol/L  
 CO2 26 18 - 29 mmol/L Calcium 9.6 8.7 - 10.3 mg/dL Protein, total 6.2 6.0 - 8.5 g/dL Albumin 3.9 3.5 - 4.8 g/dL GLOBULIN, TOTAL 2.3 1.5 - 4.5 g/dL A-G Ratio 1.7 1.2 - 2.2 Bilirubin, total 0.2 0.0 - 1.2 mg/dL Alk. phosphatase 83 39 - 117 IU/L  
 AST (SGOT) 16 0 - 40 IU/L  
 ALT (SGPT) 8 0 - 32 IU/L Narrative Performed at:  [address]  [id number] : Juan De La Cruz MD, Phone:  [phone] HEMOGLOBIN A1C WITH EAG Result Value Ref Range Hemoglobin A1c 5.5 4.8 - 5.6 % Comment:  
            Pre-diabetes: 5.7 - 6.4 Diabetes: >6.4 Glycemic control for adults with diabetes: <7.0 Estimated average glucose 111 mg/dL Narrative Performed at:  [address]  [id number] : Juan De La Cruz MD, Phone:  [phone] CVD REPORT Result Value Ref Range INTERPRETATION Note Comment:  
   Supplemental report is available. PDF IMAGE Not applicable Narrative Performed at:  [address] [place] [address]  [id number] : Maria L Basilio PhD, Phone:  [phone] CKD REPORT Result Value Ref Range Interpretation Note Comment:  
   Supplemental report is available. Narrative Performed at:  [address] [place] [address]  [id number] : Maria L Basilio PhD, Phone:  [phone] RECOMMENDATIONS: 
After reviewing your labs, I believe they are within normal  
limits for your age and let us stay with our current plan of action. In addition, you may receive a [employer] Survey from our office.    
Thank you in advance for filling this out for us, and if you cannot  
give a 10 on our ratings, please reach out to us and let us know  
what we can do better for you!  We strive for a ten, and while we  
may not be perfect 100% of the time, we always try our best for  
our patients! Holly Kulkarni M.D. Good Help to Those in Need  
s Please call me if you have any questions: [phone] Sincerely, Dorine Boast, MD

## 2018-03-08 NOTE — MR AVS SNAPSHOT
[address] [place] [address] [phone] Patient: Jim Hebert MRN: [id number] TI/3/5700 Visit Information Date & Time Provider Department Dept. Phone Encounter #  
 3/8/2018  8:50 AM Giovanni Perry MD 59[address] [facility] [phone] [id number] Follow-up Instructions Return in about 6 months (around 2018). Your Appointments 2018  2:40 PM  
Follow Up with Henry Mayers MD  
6600 [facility] Appt Note: follow up dementia  $0CP  tae  3/1/18  
 [address] [facility] [address] [address]  
[facility] 57 [address] [facility] [address] Upcoming Health Maintenance Date Due DTaP/Tdap/Td series (1 - Tdap) 1966 Pneumococcal 65+ Low/Medium Risk (1 of 2 - PCV13) 2010 GLAUCOMA SCREENING Q2Y 2016 MEDICARE YEARLY EXAM 7/15/2017 COLONOSCOPY 2018 BREAST CANCER SCRN MAMMOGRAM 2019 Allergies as of 3/8/2018  Review Complete On: 3/8/2018 By: Giovanni Perry MD  
  
 Severity Noted Reaction Type Reactions Exelon [Rivastigmine]  2014    Other (comments) Current Immunizations  Reviewed on 2016 Name Date Influenza High Dose Vaccine PF 2017, 10/2/2014 Zoster Vaccine, Live 2013 Not reviewed this visit You Were Diagnosed With   
  
 Codes Comments Routine general medical examination at a health care facility    -  Primary ICD-10-CM: Z00.00 ICD-9-CM: V70.0 Elevated blood sugar     ICD-10-CM: R73.9 ICD-9-CM: 790.29 Hypercholesteremia     ICD-10-CM: E78.00 ICD-9-CM: 272.0 Essential hypertension     ICD-10-CM: I10 
ICD-9-CM: 401.9 Residual schizophrenia ([facility] [address]     ICD-10-CM: F20.5 ICD-9-CM: 295.60 Alzheimer's disease of other onset without behavioral disturbance     ICD-10-CM: G30.8, F02.80 ICD-9-CM: 331.0, 294.10 Advance care planning     ICD-10-CM: Z71.89 ICD-9-CM: V65.49 Vitals BP Pulse Temp Resp Height(growth percentile) Weight(growth percentile) 123/66 (BP 1 Location: Right arm, BP Patient Position: Sitting) 66 97.8 °F (36.6 °C) (Oral) 20 5' 5\" (1.651 m) 128 lb 3 oz (58.1 kg) SpO2 BMI OB Status Smoking Status 98% 21.33 kg/m2 Postmenopausal Never Smoker Vitals History BMI and BSA Data Body Mass Index Body Surface Area  
 21.33 kg/m 2 1.63 m 2 Preferred Pharmacy Pharmacy Name Phone [id number] Caller, [address] Janeth Ramírez [phone] Your Updated Medication List  
  
   
This list is accurate as of 3/8/18  9:18 AM.  Always use your most recent med list.  
  
  
  
  
 benztropine 1 mg tablet Commonly known as:  COGENTIN  
TAKE ONE TABLET BY MOUTH TWICE A DAY. GENERIC FOR COGENTIN  
  
 lisinopril 5 mg tablet Commonly known as:  PRINIVIL, ZESTRIL  
TAKE ONE TABLET BY MOUTH DAILY  
  
 rivastigmine 9.5 mg/24 hr patch Commonly known as:  EXELON  
APPLY ONE PATCH TO THE SKIN DAILY. simvastatin 40 mg tablet Commonly known as:  ZOCOR  
TAKE ONE TABLET BY MOUTH EVERY NIGHT AT BEDTIME  
  
 trifluoperazine 1 mg tablet Commonly known as:  STELAZINE  
TAKE ONE TABLET BY MOUTH TWICE A DAY *GENERIC FOR STELAZINE* Prescriptions Sent to Pharmacy Refills  
 simvastatin (ZOCOR) 40 mg tablet 1 Sig: TAKE ONE TABLET BY MOUTH EVERY NIGHT AT BEDTIME Class: Normal  
 Pharmacy: [facility]/pharmacy [address] [place] [address] Ph #: [phone]  
 lisinopril (PRINIVIL, ZESTRIL) 5 mg tablet 1 Sig: TAKE ONE TABLET BY MOUTH DAILY Class: Normal  
 [facility] [address] [place] [address] Ph #: [phone]  
 trifluoperazine (STELAZINE) 1 mg tablet 2 Sig: TAKE ONE TABLET BY MOUTH TWICE A DAY *GENERIC FOR STELAZINE*  Class: Normal  
 Pharmacy: [facility]/pharmacy [address] [place] [address] Ph #: [phone]  
 benztropine (COGENTIN) 1 mg tablet 2 Sig: TAKE ONE TABLET BY MOUTH TWICE A DAY. [facility] Class: Normal  
 Pharmacy: [facility]/pharmacy [address] [place] [address] Ph #: [phone]  
 rivastigmine (EXELON) 9.5 mg/24 hr patch 3 Sig: APPLY ONE PATCH TO THE SKIN DAILY. Class: Normal  
 Pharmacy: [address] Ph #: [phone] We Performed the Following HEMOGLOBIN A1C WITH EAG [68026 CPT(R)] LIPID PANEL [53598 CPT(R)] METABOLIC PANEL, COMPREHENSIVE [41160 CPT(R)] Follow-up Instructions Return in about 6 months (around 9/8/2018). Introducing [facility] & HEALTH SERVICES! Jarrod Santos introduces [employer] patient portal. Now you can access parts of your medical record, email your doctor's office, and request medication refills online. 1. In your internet browser, go to [email or web address] 2. Click on the First Time User? Click Here link in the Sign In box. You will see the New Member Sign Up page. 3. Enter your Living Proof Access Code exactly as it appears below. You will not need to use this code after youve completed the sign-up process. If you do not sign up before the expiration date, you must request a new code. · Living Proof Access Code: [email or web address] Fang Expires: 5/30/2018  3:09 PM 
 
4. Enter the last four digits of your Social Security Number (xxxx) and Date of Birth (mm/dd/yyyy) as indicated and click Submit. You will be taken to the next sign-up page. 5. Create a Biographicont ID. This will be your Living Proof login ID and cannot be changed, so think of one that is secure and easy to remember. 6. Create a Living Proof password. You can change your password at any time. 7. Enter your Password Reset Question and Answer. This can be used at a later time if you forget your password. 8. Enter your e-mail address. You will receive e-mail notification when new information is available in [address] 9. Click Sign Up. You can now view and download portions of your medical record. 10. Click the Download Summary menu link to download a portable copy of your medical information. If you have questions, please visit the Frequently Asked Questions section of the [employer] website. Remember, [employer] is NOT to be used for urgent needs. For medical emergencies, dial 911. Now available from your iPhone and Android! Please provide this summary of care documentation to your next provider. Your primary care clinician is listed as EARL SERRATO. If you have any questions after today's visit, please call [phone].

## 2018-03-09 LAB
ALBUMIN SERPL-MCNC: 3.9 G/DL (ref 3.5–4.8)
ALBUMIN/GLOB SERPL: 1.7 {RATIO} (ref 1.2–2.2)
ALP SERPL-CCNC: 83 IU/L (ref 39–117)
ALT SERPL-CCNC: 8 IU/L (ref 0–32)
AST SERPL-CCNC: 16 IU/L (ref 0–40)
BILIRUB SERPL-MCNC: 0.2 MG/DL (ref 0–1.2)
BUN SERPL-MCNC: 16 MG/DL (ref 8–27)
BUN/CREAT SERPL: 16 (ref 12–28)
CALCIUM SERPL-MCNC: 9.6 MG/DL (ref 8.7–10.3)
CHLORIDE SERPL-SCNC: 100 MMOL/L (ref 96–106)
CHOLEST SERPL-MCNC: 152 MG/DL (ref 100–199)
CO2 SERPL-SCNC: 26 MMOL/L (ref 18–29)
CREAT SERPL-MCNC: 0.99 MG/DL (ref 0.57–1)
EST. AVERAGE GLUCOSE BLD GHB EST-MCNC: 111 MG/DL
GFR SERPLBLD CREATININE-BSD FMLA CKD-EPI: 57 ML/MIN/1.73
GFR SERPLBLD CREATININE-BSD FMLA CKD-EPI: 66 ML/MIN/1.73
GLOBULIN SER CALC-MCNC: 2.3 G/DL (ref 1.5–4.5)
GLUCOSE SERPL-MCNC: 95 MG/DL (ref 65–99)
HBA1C MFR BLD: 5.5 % (ref 4.8–5.6)
HDLC SERPL-MCNC: 50 MG/DL
INTERPRETATION, 910389: NORMAL
INTERPRETATION: NORMAL
LDLC SERPL CALC-MCNC: 84 MG/DL (ref 0–99)
PDF IMAGE, 910387: NORMAL
POTASSIUM SERPL-SCNC: 4.8 MMOL/L (ref 3.5–5.2)
PROT SERPL-MCNC: 6.2 G/DL (ref 6–8.5)
SODIUM SERPL-SCNC: 141 MMOL/L (ref 134–144)
TRIGL SERPL-MCNC: 92 MG/DL (ref 0–149)
VLDLC SERPL CALC-MCNC: 18 MG/DL (ref 5–40)

## 2018-03-09 NOTE — PROGRESS NOTES
After reviewing your labs, I believe they are within normal  limits for your age and let us stay with our current plan of action. In addition, you may receive a [employer] from our office. Thank you in advance for filling this out for us, and if you cannot   give a 10 on our ratings, please reach out to us and let us know  what we can do better for you! We strive for a ten, and while we   may not be perfect 100% of the time, we always try our best for  our patients! Jigar Pretty M.D.   Good Help to Those in Need  s

## 2018-03-13 NOTE — PROGRESS NOTES
A letter was sent to the patient at the address on file, with lab results and Dr. Len Erwin recommendations for the patient.

## 2018-04-02 ENCOUNTER — DOCUMENTATION ONLY (OUTPATIENT)
Dept: FAMILY MEDICINE CLINIC | Age: 73
End: 2018-04-02

## 2018-04-03 ENCOUNTER — DOCUMENTATION ONLY (OUTPATIENT)
Dept: FAMILY MEDICINE CLINIC | Age: 73
End: 2018-04-03

## 2018-04-03 NOTE — PROGRESS NOTES
Home Care Delivered Durable Medical Supplies CMN request was placed on Dr Moffett's desk to process.

## 2018-04-03 NOTE — PROGRESS NOTES
Home Care Delivered order was signed & faxed to [phone], ok, Copy placed in scan folder to be scanned to chart.

## 2018-04-04 ENCOUNTER — DOCUMENTATION ONLY (OUTPATIENT)
Dept: FAMILY MEDICINE CLINIC | Age: 73
End: 2018-04-04

## 2018-08-13 ENCOUNTER — HOSPITAL ENCOUNTER (OUTPATIENT)
Dept: MAMMOGRAPHY | Age: 73
Discharge: HOME | End: 2018-08-13
Attending: FAMILY MEDICINE
Payer: MEDICARE

## 2018-08-13 DIAGNOSIS — Z12.31 VISIT FOR SCREENING MAMMOGRAM: ICD-10-CM

## 2018-08-13 PROCEDURE — 77067 SCR MAMMO BI INCL CAD: CPT

## 2018-09-02 DIAGNOSIS — E78.00 HYPERCHOLESTEREMIA: ICD-10-CM

## 2018-09-02 DIAGNOSIS — I10 ESSENTIAL HYPERTENSION: ICD-10-CM

## 2018-09-04 RX ORDER — LISINOPRIL 5 MG/1
TABLET ORAL
Qty: 90 TAB | Refills: 1 | Status: SHIPPED | OUTPATIENT
Start: 2018-09-04 | End: 2019-02-20 | Stop reason: SDUPTHER

## 2018-09-04 RX ORDER — SIMVASTATIN 40 MG/1
TABLET, FILM COATED ORAL
Qty: 90 TAB | Refills: 1 | Status: SHIPPED | OUTPATIENT
Start: 2018-09-04 | End: 2019-02-25 | Stop reason: SDUPTHER

## 2018-09-06 ENCOUNTER — OFFICE VISIT (OUTPATIENT)
Dept: NEUROLOGY | Age: 73
End: 2018-09-06

## 2018-09-06 VITALS
RESPIRATION RATE: 16 BRPM | HEART RATE: 82 BPM | DIASTOLIC BLOOD PRESSURE: 65 MMHG | BODY MASS INDEX: 20.83 KG/M2 | HEIGHT: 65 IN | OXYGEN SATURATION: 96 % | SYSTOLIC BLOOD PRESSURE: 122 MMHG | WEIGHT: 125 LBS

## 2018-09-06 DIAGNOSIS — F03.90 DEMENTIA WITHOUT BEHAVIORAL DISTURBANCE, UNSPECIFIED DEMENTIA TYPE: Primary | ICD-10-CM

## 2018-09-06 RX ORDER — MEMANTINE HYDROCHLORIDE 28 MG/1
28 CAPSULE, EXTENDED RELEASE ORAL DAILY
Qty: 90 CAP | Refills: 3 | Status: SHIPPED | OUTPATIENT
Start: 2018-09-06 | End: 2019-07-16 | Stop reason: SDUPTHER

## 2018-09-06 RX ORDER — MEMANTINE HYDROCHLORIDE 7-14-21-28
KIT ORAL
Qty: 1 DOSE PACK | Refills: 0 | Status: SHIPPED | OUTPATIENT
Start: 2018-09-06 | End: 2019-01-03 | Stop reason: SDUPTHER

## 2018-09-06 NOTE — PATIENT INSTRUCTIONS
10 [facility]   Statement to Patients  April 1, 2014      In an effort to ensure the large volume of patient prescription refills is processed in the most efficient and expeditious manner, we are asking our patients to assist us by calling your Pharmacy for all prescription refills, this will include also your  Mail Order Pharmacy. The pharmacy will contact our office electronically to continue the refill process. Please do not wait until the last minute to call your pharmacy. We need at least 48 hours (2days) to fill prescriptions. We also encourage you to call your pharmacy before going to  your prescription to make sure it is ready. With regard to controlled substance prescription refill requests (narcotic refills) that need to be picked up at our office, we ask your cooperation by providing us with at least 72 hours (3days) notice that you will need a refill. We will not refill narcotic prescription refill requests after 4:00pm on any weekday, Monday through Thursday, or after 2:00pm on Fridays, or on the weekends. We encourage everyone to explore another way of getting your prescription refill request processed using "[employer] our patient web portal through our electronic medical record system. "[employer] is an efficient and effective way to communicate your medication request directly to the office and  downloadable as an amador on your smart phone . "[employer] also features a review functionality that allows you to view your medication list as well as leave messages for your physician. Are you ready to get connected? If so please review the attatched instructions or speak to any of our staff to get you set up right away! Thank you so much for your cooperation. Should you have any questions please contact our Practice Administrator. The Physicians and Staff,  [facility]   Patient history reviewed and patient examined.   Would like to add the drug Namenda XR as a helper drug with the Exelon. Hopefully this will be approved by insurance. Continue to stay as reasonably and safely busy as possible. Revisit in 6 months.

## 2018-09-06 NOTE — MR AVS SNAPSHOT
[address] [address] 
[phone] Patient: Jani Soto MRN: [id number] STA:60/7/2048 Visit Information Date & Time Provider Department Dept. Phone Encounter #  
 9/6/2018  2:40 PM Terri Burgos MD [facility] 21  Follow-up Instructions Return in about 6 months (around 3/6/2019). Follow-up and Disposition History Your Appointments 9/17/2018  9:30 AM  
ESTABLISHED PATIENT with Chelita Donnelly MD  
[address] [place] [address] [facility]) Appt Note: 6mo fuv; 6mo fuv ldm 07/23/18  
 [address] [address] [address] [address]  
[phone]  
  
   
 [address] [address] [address] [address]  
  
    
 3/14/2019  2:40 PM  
Follow Up with Terri Burgos MD  
6600 [facility]) Appt Note: follow up dementia  tae  9/6/18  
 [address] [place] [address] [address] [address]  
[facility] [address] [address] Upcoming Health Maintenance Date Due DTaP/Tdap/Td series (1 - Tdap) 11/2/1966 Pneumococcal 65+ Low/Medium Risk (1 of 2 - PCV13) 11/2/2010 GLAUCOMA SCREENING Q2Y 7/17/2016 COLONOSCOPY 7/8/2018 Influenza Age 5 to Adult 8/1/2018 MEDICARE YEARLY EXAM 3/9/2019 BREAST CANCER SCRN MAMMOGRAM 8/13/2020 Allergies as of 9/6/2018  Review Complete On: 9/6/2018 By: eTrri Burgos MD  
  
 Severity Noted Reaction Type Reactions Exelon [Rivastigmine]  01/30/2014    Other (comments) Current Immunizations  Reviewed on 7/14/2016 Name Date Influenza High Dose Vaccine PF 9/21/2017, 10/2/2014 Zoster Vaccine, Live 9/26/2013 Not reviewed this visit You Were Diagnosed With   
  
 Codes Comments Dementia without behavioral disturbance, unspecified dementia type    -  Primary ICD-10-CM: F03.90 ICD-9-CM: 294.20 Vitals BP Pulse Resp Height(growth percentile) Weight(growth percentile) SpO2  
 122/65 82 16 5' 5\" (1.651 m) 125 lb (56.7 kg) 96% BMI OB Status Smoking Status 20.8 kg/m2 Postmenopausal Never Smoker Vitals History BMI and BSA Data Body Mass Index Body Surface Area  
 20.8 kg/m 2 1.61 m 2 Preferred Pharmacy Pharmacy Name Phone [id number], [address] [phone] Your Updated Medication List  
  
   
This list is accurate as of 9/6/18  3:24 PM.  Always use your most recent med list.  
  
  
  
  
 benztropine 1 mg tablet Commonly known as:  COGENTIN  
TAKE ONE TABLET BY MOUTH TWICE A DAY. GENERIC FOR COGENTIN  
  
 lisinopril 5 mg tablet Commonly known as:  PRINIVIL, ZESTRIL  
TAKE ONE TABLET BY MOUTH DAILY  
  
 * NAMENDA XR 7-14-21-28 mg C24k Generic drug:  memantine Use per kit instructions  Indications: MODERATE TO SEVERE ALZHEIMER'S TYPE DEMENTIA * NAMENDA XR 28 mg capsule Generic drug:  memantine ER Take 1 Cap by mouth daily. Indications: MODERATE TO SEVERE ALZHEIMER'S TYPE DEMENTIA  
  
 rivastigmine 9.5 mg/24 hr patch Commonly known as:  EXELON  
APPLY ONE PATCH TO THE SKIN DAILY. simvastatin 40 mg tablet Commonly known as:  ZOCOR  
TAKE ONE TABLET BY MOUTH EVERY NIGHT AT BEDTIME  
  
 trifluoperazine 1 mg tablet Commonly known as:  STELAZINE  
TAKE ONE TABLET BY MOUTH TWICE A DAY *GENERIC FOR STELAZINE* * Notice: This list has 2 medication(s) that are the same as other medications prescribed for you. Read the directions carefully, and ask your doctor or other care provider to review them with you. 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  
 Pharmacy: [facility] [address] Ph #: [phone] NAMENDA XR 28 mg capsule 3 Sig: Take 1 Cap by mouth daily. Indications: MODERATE TO SEVERE ALZHEIMER'S TYPE DEMENTIA Class: Normal  
 Pharmacy: [address]  #: [phone] Route: Oral  
  
Follow-up Instructions Return in about 6 months (around 3/6/2019). Patient Instructions PRESCRIPTION REFILL POLICY [facility] Statement to Patients April 1, 2014 In an effort to ensure the large volume of patient prescription refills is processed in the most efficient and expeditious manner, we are asking our patients to assist us by calling your Pharmacy for all prescription refills, this will include also your  Mail Order Pharmacy. The pharmacy will contact our office electronically to continue the refill process. Please do not wait until the last minute to call your pharmacy. We need at least 48 hours (2days) to fill prescriptions. We also encourage you to call your pharmacy before going to  your prescription to make sure it is ready. With regard to controlled substance prescription refill requests (narcotic refills) that need to be picked up at our office, we ask your cooperation by providing us with at least 72 hours (3days) notice that you will need a refill. We will not refill narcotic prescription refill requests after 4:00pm on any weekday, Monday through Thursday, or after 2:00pm on Fridays, or on the weekends. We encourage everyone to explore another way of getting your prescription refill request processed using [employer], our patient web portal through our electronic medical record system. [employer] is an efficient and effective way to communicate your medication request directly to the office and  downloadable as an amador on your smart phone .  [employer] also features a review functionality that allows you to view your medication list as well as leave messages for your physician. Are you ready to get connected? If so please review the attatched instructions or speak to any of our staff to get you set up right away! Thank you so much for your cooperation. Should you have any questions please contact our Practice Administrator. The Physicians and Staff,  [facility] Patient history reviewed and patient examined. Would like to add the drug Namenda XR as a helper drug with the Exelon. Hopefully this will be approved by insurance. Continue to stay as reasonably and safely busy as possible. Revisit in 6 months. Patient Instructions History Introducing [facility] & HEALTH SERVICES! [facility] introduces [employer] patient portal. Now you can access parts of your medical record, email your doctor's office, and request medication refills online. 1. In your internet browser, go to [email or web address] 2. Click on the First Time User? Click Here link in the Sign In box. You will see the New Member Sign Up page. 3. Enter your [employer] Access Code exactly as it appears below. You will not need to use this code after youve completed the sign-up process. If you do not sign up before the expiration date, you must request a new code. · [employer] Access Code: [email or web address] Expires: 9/12/2018 10:03 AM 
 
4. Enter the last four digits of your Social Security Number (xxxx) and Date of Birth (mm/dd/yyyy) as indicated and click Submit. You will be taken to the next sign-up page. 5. Create a [employer] ID. This will be your [employer] login ID and cannot be changed, so think of one that is secure and easy to remember. 6. Create a [employer] password. You can change your password at any time. 7. Enter your Password Reset Question and Answer. This can be used at a later time if you forget your password. 8. Enter your e-mail address. You will receive e-mail notification when new information is available in [address] 9. Click Sign Up. You can now view and download portions of your medical record. 10. Click the Download Summary menu link to download a portable copy of your medical information. If you have questions, please visit the Frequently Asked Questions section of the [employer] website. Remember, [employer] is NOT to be used for urgent needs. For medical emergencies, dial 911. Now available from your iPhone and Android! Please provide this summary of care documentation to your next provider. Your primary care clinician is listed as EARL SERRATO. If you have any questions after today's visit, please call [phone].

## 2018-09-06 NOTE — PROGRESS NOTES
Neurology Consult      Subjective: Jane Gorman is a 67 y.o. female who comes in with family today and has established moderate plus grade dementia. Is on the Exelon patch at maintenance dose and tolerates it well. Went through the checklist of her daily performance and she requires assistance to get dressed and does not fix any of her meals. Sometimes since around it needing to do this for that but is redirected for her own benefit to do these things as she is capable and safe. Eating and sleeping otherwise non-controversial and there is been no new medical or surgical history. She does have anxious moments and will go through a monotonous routine of picking at her eye or mouth etc. is usually redirected to do otherwise. Mood and behavior otherwise not agitated and no outward psychosis features. On no new medicines and I thought she was close to her baseline and suggested adding the drug Namenda as a helper drug while we have an open window of opportunity at this point. Would like to go with the once a day formulation to make the transition smooth and hopefully successful. Revisit 6 months. Current Outpatient Prescriptions   Medication Sig Dispense Refill    NAMENDA XR 7-14-21-28 mg C24k Use per kit instructions  Indications: MODERATE TO SEVERE ALZHEIMER'S TYPE DEMENTIA 1 Dose Pack 0    NAMENDA XR 28 mg capsule Take 1 Cap by mouth daily. Indications: MODERATE TO SEVERE ALZHEIMER'S TYPE DEMENTIA 90 Cap 3    lisinopril (PRINIVIL, ZESTRIL) 5 mg tablet TAKE ONE TABLET BY MOUTH DAILY 90 Tab 1    simvastatin (ZOCOR) 40 mg tablet TAKE ONE TABLET BY MOUTH EVERY NIGHT AT BEDTIME 90 Tab 1    trifluoperazine (STELAZINE) 1 mg tablet TAKE ONE TABLET BY MOUTH TWICE A DAY *GENERIC FOR STELAZINE* 180 Tab 2    benztropine (COGENTIN) 1 mg tablet TAKE ONE TABLET BY MOUTH TWICE A DAY. GENERIC FOR COGENTIN 180 Tab 2    rivastigmine (EXELON) 9.5 mg/24 hr patch APPLY ONE PATCH TO THE SKIN DAILY.  90 Patch 3 Allergies   Allergen Reactions    Exelon [Rivastigmine] Other (comments)     Past Medical History:   Diagnosis Date    Anxiety     Constipation     Cough     Degenerative joint disease of knee     both knees    Edema     Hypercholesterolemia     Hypertension     Memory loss     MR (mental retardation)     Snoring       Past Surgical History:   Procedure Laterality Date    COLONOSCOPY  2008    neg    HX BREAST BIOPSY Right 2012    neg; stereotactic bx    HX CATARACT REMOVAL        Social History     Social History    Marital status: SINGLE     Spouse name: N/A    Number of children: N/A    Years of education: N/A     Occupational History    Not on file. Social History Main Topics    Smoking status: Never Smoker    Smokeless tobacco: Never Used    Alcohol use No    Drug use: No    Sexual activity: Not Currently     Other Topics Concern    Not on file     Social History Narrative      Family History   Problem Relation Age of Onset    Heart Disease Mother     Hypertension Mother     Elevated Lipids Mother     Hypertension Sister    [facility] Elevated Lipids Sister     Breast Cancer Sister 77    Elevated Lipids Brother     Hypertension Brother     Elevated Lipids Sister     Hypertension Sister       Visit Vitals    /65    Pulse 82    Resp 16    Ht 5' 5\" (1.651 m)    Wt 56.7 kg (125 lb)    SpO2 96%    BMI 20.8 kg/m2        Review of Systems:   Comprehensive review of systems negative or per history of present illness      Neuro Exam:     Appearance: The patient is well developed, well nourished, unable to provide a coherent history and is in no acute distress. Mental Status: Oriented to name only. Mood and affect appropriate to baseline which means detached and does not offer spontaneous conversation but truncated answers to questions but otherwise pleasant and cordial.   Cranial Nerves:   Intact visual fields. Fundi are benign. ARNALDO, EOM's full, no nystagmus, no ptosis. Facial sensation is normal. Corneal reflexes are intact. Facial movement is symmetric. Hearing is normal bilaterally. Palate is midline with normal sternocleidomastoid and trapezius muscles are normal. Tongue is midline. Motor:  5/5 strength in upper and lower proximal and distal muscles. Normal bulk and tone. No fasciculations. Reflexes:   Deep tendon reflexes 2+/4 and symmetrical.   Sensory:   Normal to touch, pinprick and vibration. Gait:  Normal gait. Tremor:   No tremor noted. Cerebellar:  No cerebellar signs present. Neurovascular:  Normal heart sounds and regular rhythm, peripheral pulses intact, and no carotid bruits. Assessment:   Patient with moderate plus grade dementia. At this time would like to take the opportunity to add helper drug Namenda to the Exelon. Will suggest using the supple once a day extended release formulation and will titrate over 1 month to maintenance dose 28 mg daily. Stays reasonably safely busy as possible. Revisit 6 months. Plan:   Revisit 6 months.   Signed by :  Selby Seip MD

## 2018-09-17 ENCOUNTER — HOSPITAL ENCOUNTER (OUTPATIENT)
Dept: LAB | Age: 73
Discharge: HOME | End: 2018-09-17
Payer: MEDICARE

## 2018-09-17 ENCOUNTER — OFFICE VISIT (OUTPATIENT)
Dept: FAMILY MEDICINE CLINIC | Age: 73
End: 2018-09-17

## 2018-09-17 ENCOUNTER — TELEPHONE (OUTPATIENT)
Dept: FAMILY MEDICINE CLINIC | Age: 73
End: 2018-09-17

## 2018-09-17 VITALS
OXYGEN SATURATION: 97 % | BODY MASS INDEX: 22.82 KG/M2 | TEMPERATURE: 97.7 F | HEIGHT: 65 IN | SYSTOLIC BLOOD PRESSURE: 139 MMHG | HEART RATE: 70 BPM | RESPIRATION RATE: 20 BRPM | DIASTOLIC BLOOD PRESSURE: 75 MMHG | WEIGHT: 137 LBS

## 2018-09-17 DIAGNOSIS — G30.8 ALZHEIMER'S DISEASE OF OTHER ONSET WITHOUT BEHAVIORAL DISTURBANCE: ICD-10-CM

## 2018-09-17 DIAGNOSIS — E78.00 HIGH CHOLESTEROL: ICD-10-CM

## 2018-09-17 DIAGNOSIS — I10 ESSENTIAL HYPERTENSION: Primary | ICD-10-CM

## 2018-09-17 DIAGNOSIS — M25.562 CHRONIC PAIN OF BOTH KNEES: ICD-10-CM

## 2018-09-17 DIAGNOSIS — M25.561 CHRONIC PAIN OF BOTH KNEES: ICD-10-CM

## 2018-09-17 DIAGNOSIS — F02.80 ALZHEIMER'S DISEASE OF OTHER ONSET WITHOUT BEHAVIORAL DISTURBANCE: ICD-10-CM

## 2018-09-17 DIAGNOSIS — G89.29 CHRONIC PAIN OF BOTH KNEES: ICD-10-CM

## 2018-09-17 DIAGNOSIS — R55 SYNCOPE AND COLLAPSE: ICD-10-CM

## 2018-09-17 DIAGNOSIS — F20.5 RESIDUAL SCHIZOPHRENIA (HCC): ICD-10-CM

## 2018-09-17 DIAGNOSIS — Z23 ENCOUNTER FOR IMMUNIZATION: ICD-10-CM

## 2018-09-17 PROCEDURE — 80061 LIPID PANEL: CPT

## 2018-09-17 PROCEDURE — 85025 COMPLETE CBC W/AUTO DIFF WBC: CPT

## 2018-09-17 PROCEDURE — 80053 COMPREHEN METABOLIC PANEL: CPT

## 2018-09-17 PROCEDURE — 84443 ASSAY THYROID STIM HORMONE: CPT

## 2018-09-17 RX ORDER — RIVASTIGMINE 9.5 MG/24H
PATCH, EXTENDED RELEASE TRANSDERMAL
Qty: 90 PATCH | Refills: 3 | Status: SHIPPED | OUTPATIENT
Start: 2018-09-17 | End: 2019-09-18 | Stop reason: SDUPTHER

## 2018-09-17 RX ORDER — BENZTROPINE MESYLATE 1 MG/1
TABLET ORAL
Qty: 180 TAB | Refills: 2 | Status: SHIPPED | OUTPATIENT
Start: 2018-09-17 | End: 2019-08-11 | Stop reason: SDUPTHER

## 2018-09-17 RX ORDER — TRIFLUOPERAZINE HYDROCHLORIDE 1 MG/1
TABLET, FILM COATED ORAL
Qty: 180 TAB | Refills: 2 | Status: SHIPPED | OUTPATIENT
Start: 2018-09-17 | End: 2019-01-29

## 2018-09-17 NOTE — TELEPHONE ENCOUNTER
[facility] called and verified orders needed for new rollater walker with hand brakes and seat, and shower chair faxed to [phone], demographics and progress notes from today. Done, confirmed, scanned.

## 2018-09-17 NOTE — PROGRESS NOTES
Patient here for non-fasting labs. She also would like a flu shot. Patient also has a mole on back that is itching and is changing. Patient is also here for face to face for a new rollator walker with large wheels,seat and hand brakes. Her current walker has been used for about 10 years. She ambulates well with it. It helps her steady and keep her balance, preventing falls. Patient also needs a shower chair. She loses balance when standing for a long period of time. Patient had a fall in the shower and her sister, who she lives with,  is getting older and having a harder time managing her bathing. Patient would also benefit from a hand rail beside toilet in bathroom to assist patient from sitting position to standing position while toileting. Patient's sister is currently working with [employer], and needs orders and notes to be submitted. 1. Have you been to the ER, urgent care clinic since your last visit? Hospitalized since your last visit? No 
 
2. Have you seen or consulted any other health care providers outside of the [address] [place] [address] since your last visit? Include any pap smears or colon screening. No  
 
 
 
Chief Complaint Patient presents with  Hypertension 6 month labs, non-fasting  Immunization/Injection  
  flu shot  Mole  
  on back, looks funny  Other  
  face to face for new rollater walker, shower chair. She is a 67 y.o. female who presents for evalution. Reviewed PmHx, RxHx, FmHx, SocHx, AllgHx and updated and dated in the chart. Patient Active Problem List  
 Diagnosis  Chronic pain of both knees  Advance care planning POA  Alzheimer's disease  Syncope and collapse  Anxiety  Edema  Snoring  Cough  Constipation  Memory loss  Dementia in conditions classified elsewhere without behavioral disturbance  Schizophrenia ([facility] [address]  Mental retardation  Hypercholesteremia  
 HTN (hypertension)  Mental disability  DJD (degenerative joint disease) Review of Systems - negative except as listed above in the HPI Objective:  
 
Vitals:  
 09/17/18 0932 BP: 139/75 Pulse: 70 Resp: 20 Temp: 97.7 °F (36.5 °C) SpO2: 97% Weight: 137 lb (62.1 kg) Height: 5' 5\" (1.651 m) Physical Examination: General appearance - alert, well appearing, and in no distress Neck - supple, no significant adenopathy Chest - clear to auscultation, no wheezes, rales or rhonchi, symmetric air entry Heart - normal rate, regular rhythm, normal S1, S2, no murmurs, rubs, clicks or gallops Abdomen - soft, nontender, nondistended, no masses or organomegaly Assessment/ Plan:  
Diagnoses and all orders for this visit: 1. Essential hypertension -     METABOLIC PANEL, COMPREHENSIVE 
-     CBC WITH AUTOMATED DIFF 
-     TSH 3RD GENERATION 
-at goal 
 
2. High cholesterol -     LIPID PANEL 3. Syncope and collapse 
-see above 
-needs shower chair due to fall risk 4. Chronic pain of both knees 
-see above 
-needs new rolator walker 
-old one is falling apart 5. Residual schizophrenia ([facility] [address] -stable 6. Alzheimer's disease of other onset without behavioral disturbance 
-stable 7. Encounter for immunization -     Influenza Vaccine Inactivated (IIV)(FLUAD), Subunit, Adjuvanted, IM, (43121) -     Administration fee () for Medicare insured patients Other orders 
-     trifluoperazine (STELAZINE) 1 mg tablet; TAKE ONE TABLET BY MOUTH TWICE A DAY *GENERIC FOR STELAZINE* 
-     benztropine (COGENTIN) 1 mg tablet; TAKE ONE TABLET BY MOUTH TWICE A DAY. GENERIC FOR COGENTIN 
-     rivastigmine (EXELON) 9.5 mg/24 hr patch; APPLY ONE PATCH TO THE SKIN DAILY. Follow-up Disposition: 
Return if symptoms worsen or fail to improve. I have discussed the diagnosis with the patient and the intended plan as seen in the above orders.   The patient understands and agrees with the plan. The patient has received an after-visit summary and questions were answered concerning future plans. Medication Side Effects and Warnings were discussed with patient Patient Labs were reviewed and or requested: 
Patient Past Records were reviewed and or requested Bedelia Schaumann, M.D. There are no Patient Instructions on file for this visit.

## 2018-09-17 NOTE — TELEPHONE ENCOUNTER
[facility] Pringle called and stated that you can call them to place an order for a walker.  Call Back # [phone]

## 2018-09-17 NOTE — MR AVS SNAPSHOT
[address] [place] [address] [phone] Patient: Jet Cuadra MRN: [id number] [id number] Visit Information Date & Time Provider Department Dept. Phone Encounter #  
 9/17/2018  9:30 AM Yung Martin MD [facility] [phone] [id number] Follow-up Instructions Return if symptoms worsen or fail to improve. Your Appointments 3/14/2019  2:40 PM  
Follow Up with Marylen Gale, MD  
[facility] App[facility] Note: follow up dementia  tae  9/6/18  
 [facility] [address] [address]  
[place] [address] [address] Upcoming Health Maintenance Date Due DTaP/Tdap/Td series (1 - Tdap) 11/2/1966 Pneumococcal 65+ Low/Medium Risk (1 of 2 - PCV13) 11/2/2010 GLAUCOMA SCREENING Q2Y 7/17/2016 COLONOSCOPY 7/8/2018 Influenza Age 5 to Adult 8/1/2018 MEDICARE YEARLY EXAM 3/9/2019 BREAST CANCER SCRN MAMMOGRAM 8/13/2020 Allergies as of 9/17/2018  Review Complete On: 9/17/2018 By: Yung Martin MD  
 No Active Allergies Current Immunizations  Reviewed on 7/14/2016 Name Date Influenza High Dose Vaccine PF 9/21/2017, 10/2/2014 Influenza Vaccine (Tri) Adjuvanted  Incomplete Zoster Vaccine, Live 9/26/2013 Not reviewed this visit You Were Diagnosed With   
  
 Codes Comments Essential hypertension    -  Primary ICD-10-CM: I10 
ICD-9-CM: 401.9 High cholesterol     ICD-10-CM: E78.00 ICD-9-CM: 272.0 Syncope and collapse     ICD-10-CM: R55 
ICD-9-CM: 780. 2 Chronic pain of both knees     ICD-10-CM: M25.561, M25.562, G89.29 ICD-9-CM: 719.46, 338.29 Residual schizophrenia ([facility] [address]     ICD-10-CM: F20.5 ICD-9-CM: 295.60 Alzheimer's disease of other onset without behavioral disturbance     ICD-10-CM: G30.8, F02.80 ICD-9-CM: 331.0, 294.10 Encounter for immunization     ICD-10-CM: E32 ICD-9-CM: V03.89 Vitals BP Pulse Temp Resp Height(growth percentile) Weight(growth percentile) 139/75 70 97.7 °F (36.5 °C) 20 5' 5\" (1.651 m) 137 lb (62.1 kg) SpO2 BMI OB Status Smoking Status 97% 22.8 kg/m2 Postmenopausal Never Smoker Vitals History BMI and BSA Data Body Mass Index Body Surface Area  
 22.8 kg/m 2 1.69 m 2 Preferred Pharmacy Pharmacy Name Phone [id number], [address] [facility] [phone] Your Updated Medication List  
  
   
This list is accurate as of 9/17/18 10:12 AM.  Always use your most recent med list.  
  
  
  
  
 benztropine 1 mg tablet Commonly known as:  COGENTIN  
TAKE ONE TABLET BY MOUTH TWICE A DAY. GENERIC FOR COGENTIN  
  
 lisinopril 5 mg tablet Commonly known as:  PRINIVIL, ZESTRIL  
TAKE ONE TABLET BY MOUTH DAILY  
  
 * NAMENDA XR 7-14-21-28 mg C24k Generic drug:  memantine Use per kit instructions  Indications: MODERATE TO SEVERE ALZHEIMER'S TYPE DEMENTIA * NAMENDA XR 28 mg capsule Generic drug:  memantine ER Take 1 Cap by mouth daily. Indications: MODERATE TO SEVERE ALZHEIMER'S TYPE DEMENTIA  
  
 rivastigmine 9.5 mg/24 hr patch Commonly known as:  EXELON  
APPLY ONE PATCH TO THE SKIN DAILY. simvastatin 40 mg tablet Commonly known as:  ZOCOR  
TAKE ONE TABLET BY MOUTH EVERY NIGHT AT BEDTIME  
  
 trifluoperazine 1 mg tablet Commonly known as:  STELAZINE  
TAKE ONE TABLET BY MOUTH TWICE A DAY *GENERIC FOR STELAZINE* * Notice: This list has 2 medication(s) that are the same as other medications prescribed for you. Read the directions carefully, and ask your doctor or other care provider to review them with you. Prescriptions Sent to Pharmacy Refills  
 trifluoperazine (STELAZINE) 1 mg tablet 2 Sig: TAKE ONE TABLET BY MOUTH TWICE A DAY *GENERIC FOR STELAZINE*  Class: Normal  
 Pharmacy: [facility] [address] [place] [address] Ph #: [phone]  
 benztropine (COGENTIN) 1 mg tablet 2 Sig: TAKE ONE TABLET BY MOUTH TWICE A DAY. [facility] Class: Normal  
 Pharmacy: [facility] [address] [place] [address] Ph #: [phone]  
 rivastigmine (EXELON) 9.5 mg/24 hr patch 3 Sig: APPLY ONE PATCH TO THE SKIN DAILY. Class: Normal  
 Pharmacy: [facility] [address] Ph #: [phone] We Performed the Following ADMIN INFLUENZA VIRUS VAC [ HCPCS] CBC WITH AUTOMATED DIFF [17113 CPT(R)] INFLUENZA VACCINE INACTIVATED (IIV), SUBUNIT, ADJUVANTED, IM [id number] CPT(R)] LIPID PANEL [31693 CPT(R)] METABOLIC PANEL, COMPREHENSIVE [94086 CPT(R)] TSH 3RD GENERATION [90434 CPT(R)] Follow-up Instructions Return if symptoms worsen or fail to improve. Introducing [facility]! [employer] introduces [employer] patient portal. Now you can access parts of your medical record, email your doctor's office, and request medication refills online. 1. In your internet browser, go to [email or web address] 2. Click on the First Time User? Click Here link in the Sign In box. You will see the New Member Sign Up page. 3. Enter your Ko[employer] Access Code exactly as it appears below. You will not need to use this code after youve completed the sign-up process. If you do not sign up before the expiration date, you must request a new code. · Ko[employer] Access Code: [email or web address] Expires: 12/16/2018  9:09 AM 
 
4. Enter the last four digits of your Social Security Number (xxxx) and Date of Birth (mm/dd/yyyy) as indicated and click Submit. You will be taken to the next sign-up page. 5. Create a Koalify ID. This will be your Koalify login ID and cannot be changed, so think of one that is secure and easy to remember. 6. Create a Tricycle password. You can change your password at any time. 7. Enter your Password Reset Question and Answer. This can be used at a later time if you forget your password. 8. Enter your e-mail address. You will receive e-mail notification when new information is available in [address] 9. Click Sign Up. You can now view and download portions of your medical record. 10. Click the Download Summary menu link to download a portable copy of your medical information. If you have questions, please visit the Frequently Asked Questions section of the [employer] website. Remember, [employer]ycle is NOT to be used for urgent needs. For medical emergencies, dial 911. Now available from your iPhone and Android! Please provide this summary of care documentation to your next provider. Your primary care clinician is listed as EARL SERRATO. If you have any questions after today's visit, please call [phone].

## 2018-09-18 LAB
ALBUMIN SERPL-MCNC: 4 G/DL (ref 3.5–4.8)
ALBUMIN/GLOB SERPL: 1.5 {RATIO} (ref 1.2–2.2)
ALP SERPL-CCNC: 82 IU/L (ref 39–117)
ALT SERPL-CCNC: 10 IU/L (ref 0–32)
AST SERPL-CCNC: 17 IU/L (ref 0–40)
BASOPHILS # BLD AUTO: 0 X10E3/UL (ref 0–0.2)
BASOPHILS NFR BLD AUTO: 1 %
BILIRUB SERPL-MCNC: <0.2 MG/DL (ref 0–1.2)
BUN SERPL-MCNC: 14 MG/DL (ref 8–27)
BUN/CREAT SERPL: 15 (ref 12–28)
CALCIUM SERPL-MCNC: 9.5 MG/DL (ref 8.7–10.3)
CHLORIDE SERPL-SCNC: 101 MMOL/L (ref 96–106)
CHOLEST SERPL-MCNC: 155 MG/DL (ref 100–199)
CO2 SERPL-SCNC: 27 MMOL/L (ref 20–29)
CREAT SERPL-MCNC: 0.92 MG/DL (ref 0.57–1)
EOSINOPHIL # BLD AUTO: 0.2 X10E3/UL (ref 0–0.4)
EOSINOPHIL NFR BLD AUTO: 3 %
ERYTHROCYTE [DISTWIDTH] IN BLOOD BY AUTOMATED COUNT: 14 % (ref 12.3–15.4)
GLOBULIN SER CALC-MCNC: 2.6 G/DL (ref 1.5–4.5)
GLUCOSE SERPL-MCNC: 92 MG/DL (ref 65–99)
HCT VFR BLD AUTO: 35.6 % (ref 34–46.6)
HDLC SERPL-MCNC: 49 MG/DL
HGB BLD-MCNC: 11.5 G/DL (ref 11.1–15.9)
IMM GRANULOCYTES # BLD: 0 X10E3/UL (ref 0–0.1)
IMM GRANULOCYTES NFR BLD: 0 %
INTERPRETATION, 910389: NORMAL
LDLC SERPL CALC-MCNC: 78 MG/DL (ref 0–99)
LYMPHOCYTES # BLD AUTO: 1.6 X10E3/UL (ref 0.7–3.1)
LYMPHOCYTES NFR BLD AUTO: 20 %
MCH RBC QN AUTO: 28.8 PG (ref 26.6–33)
MCHC RBC AUTO-ENTMCNC: 32.3 G/DL (ref 31.5–35.7)
MCV RBC AUTO: 89 FL (ref 79–97)
MONOCYTES # BLD AUTO: 0.8 X10E3/UL (ref 0.1–0.9)
MONOCYTES NFR BLD AUTO: 10 %
NEUTROPHILS # BLD AUTO: 5.1 X10E3/UL (ref 1.4–7)
NEUTROPHILS NFR BLD AUTO: 66 %
PLATELET # BLD AUTO: 386 X10E3/UL (ref 150–379)
POTASSIUM SERPL-SCNC: 5 MMOL/L (ref 3.5–5.2)
PROT SERPL-MCNC: 6.6 G/DL (ref 6–8.5)
RBC # BLD AUTO: 4 X10E6/UL (ref 3.77–5.28)
SODIUM SERPL-SCNC: 141 MMOL/L (ref 134–144)
TRIGL SERPL-MCNC: 139 MG/DL (ref 0–149)
TSH SERPL DL<=0.005 MIU/L-ACNC: 1.28 UIU/ML (ref 0.45–4.5)
VLDLC SERPL CALC-MCNC: 28 MG/DL (ref 5–40)
WBC # BLD AUTO: 7.8 X10E3/UL (ref 3.4–10.8)

## 2018-09-18 NOTE — PROGRESS NOTES
After reviewing your labs, I believe they are within normal 
limits for your age and let us stay with our current plan of action. If you have any questions, feel free to email thru [facility], or give us  
a call back. Mimi Mallory M.D. Good Help to Those in Need \"You maybe whatever you resolve to be\"

## 2018-09-27 ENCOUNTER — TELEPHONE (OUTPATIENT)
Dept: FAMILY MEDICINE CLINIC | Age: 73
End: 2018-09-27

## 2018-10-18 ENCOUNTER — DOCUMENTATION ONLY (OUTPATIENT)
Dept: FAMILY MEDICINE CLINIC | Age: 73
End: 2018-10-18

## 2018-10-18 NOTE — PROGRESS NOTES
Faxed 09/17/18 office notes/lab results to Dr Blas Flores per [facility] request. Fax #[phone] confirmation received.

## 2018-11-28 ENCOUNTER — OFFICE VISIT (OUTPATIENT)
Dept: FAMILY MEDICINE CLINIC | Age: 73
End: 2018-11-28

## 2018-11-28 VITALS
OXYGEN SATURATION: 97 % | WEIGHT: 137 LBS | SYSTOLIC BLOOD PRESSURE: 130 MMHG | HEIGHT: 59 IN | HEART RATE: 73 BPM | RESPIRATION RATE: 14 BRPM | TEMPERATURE: 97.3 F | DIASTOLIC BLOOD PRESSURE: 79 MMHG | BODY MASS INDEX: 27.62 KG/M2

## 2018-11-28 DIAGNOSIS — F02.80 ALZHEIMER'S DISEASE OF OTHER ONSET WITHOUT BEHAVIORAL DISTURBANCE: ICD-10-CM

## 2018-11-28 DIAGNOSIS — I10 ESSENTIAL HYPERTENSION: ICD-10-CM

## 2018-11-28 DIAGNOSIS — G30.8 ALZHEIMER'S DISEASE OF OTHER ONSET WITHOUT BEHAVIORAL DISTURBANCE: ICD-10-CM

## 2018-11-28 DIAGNOSIS — R21 RASH: Primary | ICD-10-CM

## 2018-11-28 RX ORDER — TRIAMCINOLONE ACETONIDE 1 MG/G
CREAM TOPICAL DAILY
Qty: 15 G | Refills: 0 | Status: SHIPPED | OUTPATIENT
Start: 2018-11-28

## 2018-11-28 RX ORDER — NYSTATIN 100000 [USP'U]/G
POWDER TOPICAL 4 TIMES DAILY
Qty: 1 BOTTLE | Refills: 5 | Status: SHIPPED | OUTPATIENT
Start: 2018-11-28

## 2018-11-28 NOTE — PROGRESS NOTES
Chief Complaint   Patient presents with    Rash     Abdo, inguinal crease    Dementia    Hypertension     1. Have you been to the ER, urgent care clinic since your last visit? Hospitalized since your last visit? No    2. Have you seen or consulted any other health care providers outside of the [address] [place] [address] since your last visit? Include any pap smears or colon screening. Yes Where: Colonoscopy      Chief Complaint   Patient presents with    Rash     Abdo, inguinal crease    Dementia    Hypertension     She is a 68 y.o. female who presents for evalution. Reviewed PmHx, RxHx, FmHx, SocHx, AllgHx and updated and dated in the chart. Patient Active Problem List    Diagnosis    Chronic pain of both knees    Advance care planning     POA      Alzheimer's disease    Syncope and collapse    Anxiety    Edema    Snoring    Cough    Constipation    Memory loss    Dementia in conditions classified elsewhere without behavioral disturbance    Schizophrenia ([facility] [address]    Mental retardation    Hypercholesteremia    HTN (hypertension)    Mental disability    DJD (degenerative joint disease)       Review of Systems - negative except as listed above in the HPI    Objective:     Vitals:    11/28/18 0953   BP: 130/79   Pulse: 73   Resp: 14   Temp: 97.3 °F (36.3 °C)   TempSrc: Oral   SpO2: 97%   Weight: 137 lb (62.1 kg)   Height: 4' 11\" (1.499 m)     Physical Examination: General appearance - alert, well appearing, and in no distress  Neck - supple, no significant adenopathy  Chest - clear to auscultation, no wheezes, rales or rhonchi, symmetric air entry  Heart - normal rate, regular rhythm, normal S1, S2, no murmurs, rubs, clicks or gallops  Skin - yeast infx in groin area and mild rash on upper lip    Assessment/ Plan:   Diagnoses and all orders for this visit:    1. Rash  -     nystatin (MYCOSTATIN) powder; Apply  to affected area four (4) times daily.   -     triamcinolone acetonide (KENALOG) 0.1 % topical cream; Apply  to affected area daily. To lip    2. Essential hypertension  -at goal    3. Alzheimer's disease of other onset without behavioral disturbance  -stable       Follow-up Disposition:  Return if symptoms worsen or fail to improve. I have discussed the diagnosis with the patient and the intended plan as seen in the above orders. The patient understands and agrees with the plan. The patient has received an after-visit summary and questions were answered concerning future plans. Medication Side Effects and Warnings were discussed with patient  Patient Labs were reviewed and or requested:  Patient Past Records were reviewed and or requested    Fredi Fowler M.D. There are no Patient Instructions on file for this visit.

## 2019-01-03 ENCOUNTER — OFFICE VISIT (OUTPATIENT)
Dept: FAMILY MEDICINE CLINIC | Age: 74
End: 2019-01-03

## 2019-01-03 VITALS
WEIGHT: 133 LBS | TEMPERATURE: 98.1 F | SYSTOLIC BLOOD PRESSURE: 156 MMHG | HEART RATE: 68 BPM | HEIGHT: 59 IN | RESPIRATION RATE: 14 BRPM | OXYGEN SATURATION: 98 % | DIASTOLIC BLOOD PRESSURE: 71 MMHG | BODY MASS INDEX: 26.81 KG/M2

## 2019-01-03 DIAGNOSIS — I10 ESSENTIAL HYPERTENSION: Primary | ICD-10-CM

## 2019-01-03 NOTE — PROGRESS NOTES
Chief Complaint Patient presents with  Eye Problem X 1-2 weeks: Right eye red  Hypertension  Skin Problem 1. Have you been to the ER, urgent care clinic since your last visit? Hospitalized since your last visit? No 
 
2. Have you seen or consulted any other health care providers outside of the [address] [place] [address] since your last visit? Include any pap smears or colon screening. No 
 
Chief Complaint Patient presents with  Eye Problem X 1-2 weeks: Right eye red  Hypertension  Skin Problem She is a 68 y.o. female who presents for evalution. Reviewed PmHx, RxHx, FmHx, SocHx, AllgHx and updated and dated in the chart. Patient Active Problem List  
 Diagnosis  Chronic pain of both knees  Advance care planning POA  Alzheimer's disease  Syncope and collapse  Anxiety  Edema  Snoring  Cough  Constipation  Memory loss  Dementia in conditions classified elsewhere without behavioral disturbance  Schizophrenia ([facility] [address]  Mental retardation  Hypercholesteremia  
 HTN (hypertension)  Mental disability  DJD (degenerative joint disease) Review of Systems - negative except as listed above in the HPI Objective:  
 
Vitals:  
 01/03/19 1459 BP: 156/71 Pulse: 68 Resp: 14 Temp: 98.1 °F (36.7 °C) TempSrc: Oral  
SpO2: 98% Weight: 133 lb (60.3 kg) Height: 4' 11\" (1.499 m) Physical Examination: General appearance - alert, well appearing, and in no distress Eyes - pupils equal and reactive, extraocular eye movements intact Ears - bilateral TM's and external ear canals normal 
Chest - clear to auscultation, no wheezes, rales or rhonchi, symmetric air entry Heart - normal rate, regular rhythm, normal S1, S2, no murmurs, rubs, clicks or gallops Assessment/ Plan:  
Diagnoses and all orders for this visit: 1. Essential hypertension -at goal at home 
-neg eye exam 
 
  
Follow-up Disposition: Return if symptoms worsen or fail to improve. I have discussed the diagnosis with the patient and the intended plan as seen in the above orders. The patient understands and agrees with the plan. The patient has received an after-visit summary and questions were answered concerning future plans. Medication Side Effects and Warnings were discussed with patient Patient Labs were reviewed and or requested: 
Patient Past Records were reviewed and or requested Vel Mcdonald M.D. There are no Patient Instructions on file for this visit.

## 2019-01-24 ENCOUNTER — TELEPHONE (OUTPATIENT)
Dept: FAMILY MEDICINE CLINIC | Age: 74
End: 2019-01-24

## 2019-01-24 NOTE — TELEPHONE ENCOUNTER
Patients sister, Carmine Marcum and [facility]   called stating that the pharmacy can no longer get the Trifluoterazine, antipsychotic med.   She is requesting another medication for her condition [facility] [address]

## 2019-01-29 ENCOUNTER — DOCUMENTATION ONLY (OUTPATIENT)
Dept: FAMILY MEDICINE CLINIC | Age: 74
End: 2019-01-29

## 2019-01-29 RX ORDER — PERPHENAZINE 2 MG/1
2 TABLET, FILM COATED ORAL 2 TIMES DAILY
Qty: 60 TAB | Refills: 1 | Status: SHIPPED | OUTPATIENT
Start: 2019-01-29 | End: 2019-03-21 | Stop reason: SDUPTHER

## 2019-02-12 ENCOUNTER — DOCUMENTATION ONLY (OUTPATIENT)
Dept: FAMILY MEDICINE CLINIC | Age: 74
End: 2019-02-12

## 2019-02-13 ENCOUNTER — DOCUMENTATION ONLY (OUTPATIENT)
Dept: FAMILY MEDICINE CLINIC | Age: 74
End: 2019-02-13

## 2019-02-13 NOTE — PROGRESS NOTES
Home [facility] CMN for durable medical was signed & faxed to [phone],ok,Copy placed in scan folder to be scanned to chart.

## 2019-02-20 DIAGNOSIS — I10 ESSENTIAL HYPERTENSION: ICD-10-CM

## 2019-02-20 RX ORDER — LISINOPRIL 5 MG/1
TABLET ORAL
Qty: 90 TAB | Refills: 1 | Status: SHIPPED | OUTPATIENT
Start: 2019-02-20 | End: 2019-08-13 | Stop reason: SDUPTHER

## 2019-02-25 DIAGNOSIS — E78.00 HYPERCHOLESTEREMIA: ICD-10-CM

## 2019-02-25 RX ORDER — SIMVASTATIN 40 MG/1
TABLET, FILM COATED ORAL
Qty: 90 TAB | Refills: 1 | Status: SHIPPED | OUTPATIENT
Start: 2019-02-25 | End: 2019-08-15 | Stop reason: SDUPTHER

## 2019-03-05 ENCOUNTER — TELEPHONE (OUTPATIENT)
Dept: NEUROLOGY | Age: 74
End: 2019-03-05

## 2019-03-05 NOTE — TELEPHONE ENCOUNTER
Pt stated that she received a letter from [address] [place] [address] stated that they need a letter stating why she needs that drug. NAMENDA XR 28 mg capsule  Pt would like a call back to give details.

## 2019-03-14 ENCOUNTER — OFFICE VISIT (OUTPATIENT)
Dept: NEUROLOGY | Age: 74
End: 2019-03-14

## 2019-03-14 VITALS
WEIGHT: 130 LBS | HEART RATE: 88 BPM | DIASTOLIC BLOOD PRESSURE: 70 MMHG | RESPIRATION RATE: 18 BRPM | SYSTOLIC BLOOD PRESSURE: 144 MMHG | BODY MASS INDEX: 26.21 KG/M2 | HEIGHT: 59 IN | OXYGEN SATURATION: 98 %

## 2019-03-14 DIAGNOSIS — F03.91 DEMENTIA WITH BEHAVIORAL DISTURBANCE, UNSPECIFIED DEMENTIA TYPE: Primary | ICD-10-CM

## 2019-03-14 RX ORDER — TRIFLUOPERAZINE HYDROCHLORIDE 1 MG/1
1 TABLET, FILM COATED ORAL 2 TIMES DAILY
COMMUNITY
End: 2019-10-22

## 2019-03-14 NOTE — PROGRESS NOTES
Neurology Consult      Subjective: Samir Monson is a 68 y.o. female who comes in today caregiver. A lot has transpired in recent months and basically pattern is 1 of the agitated mood and behavior. Very impulsive and refusing to eat. Has physical activities that are impulsive and repetitive to the point of hurting herself. I suggested perhaps Seroquel but I quickly added that it is black box for increased vascular events and is not strictly indicated for such an application etc. Cornell Jihan had been added to the [employer] but the insurance company wanted to deny the Namenda part. They thought this was an inappropriate application and we are trying to refute that claim. I believe the caregiver is at her limits and the next step as a best interest to all would be a nursing home with a dementia unit. Revisit here will be pended. Current Outpatient Medications   Medication Sig Dispense Refill    simvastatin (ZOCOR) 40 mg tablet TAKE 1 TABLET BY MOUTH EVERYDAY AT BEDTIME 90 Tab 1    lisinopril (PRINIVIL, ZESTRIL) 5 mg tablet TAKE 1 TABLET BY MOUTH EVERY DAY 90 Tab 1    perphenazine (TRILAFON) 2 mg tablet Take 1 Tab by mouth two (2) times a day. 60 Tab 1    nystatin (MYCOSTATIN) powder Apply  to affected area four (4) times daily. 1 Bottle 5    triamcinolone acetonide (KENALOG) 0.1 % topical cream Apply  to affected area daily. To lip 15 g 0    benztropine (COGENTIN) 1 mg tablet TAKE ONE TABLET BY MOUTH TWICE A DAY. GENERIC FOR COGENTIN 180 Tab 2    rivastigmine (EXELON) 9.5 mg/24 hr patch APPLY ONE PATCH TO THE SKIN DAILY. 90 Patch 3    NAMENDA XR 28 mg capsule Take 1 Cap by mouth daily. Indications: MODERATE TO SEVERE ALZHEIMER'S TYPE DEMENTIA 90 Cap 3    trifluoperazine (STELAZINE) 1 mg tablet Take 1 mg by mouth two (2) times a day.         No Known Allergies  Past Medical History:   Diagnosis Date    Anxiety     Constipation     Cough     Degenerative joint disease of knee     both knees    Edema     Hypercholesterolemia     Hypertension     Memory loss     MR (mental retardation)     Snoring       Past Surgical History:   Procedure Laterality Date    COLONOSCOPY  2008    neg    HX BREAST BIOPSY Right 2012    neg; stereotactic bx    HX CATARACT REMOVAL        Social History     Socioeconomic History    Marital status: SINGLE     Spouse name: Not on file    Number of children: Not on file    Years of education: Not on file    Highest education level: Not on file   Social Needs    Financial resource strain: Not on file    Food insecurity - worry: Not on file    Food insecurity - inability: Not on file    Transportation needs - medical: Not on file   [place] needs - non-medical: Not on file   Occupational History    Not on file   Tobacco Use    Smoking status: Never Smoker    Smokeless tobacco: Never Used   Substance and Sexual Activity    Alcohol use: No    Drug use: No    Sexual activity: Not Currently   Other Topics Concern    Not on file   Social History Narrative    Not on file      Family History   Problem Relation Age of Onset    Heart Disease Mother     Hypertension Mother     Elevated Lipids Mother     Hypertension Sister     Elevated Lipids Sister     Breast Cancer Sister 77    Elevated Lipids Brother     Hypertension Brother     Elevated Lipids Sister     Hypertension Sister       Visit Vitals  /70   Pulse 88   Resp 18   Ht 4' 11\" (1.499 m)   Wt 59 kg (130 lb)   SpO2 98%   BMI 26.26 kg/m²        Review of Systems:   A comprehensive review of systems was negative except for that written in the HPI. Neuro Exam:     Appearance: The patient is well developed, well nourished, and unable to provide a coherent history and is in no acute distress. Mental Status: Oriented to name by inference. Mood and affect appropriate to her baseline which is detached nonoverbal poor eye contact etc... Cranial Nerves:   Intact visual fields.  Fundi are benign. ARNALDO, EOM's full, no nystagmus, no ptosis. Facial sensation is normal. Corneal reflexes are intact. Facial movement is symmetric. Hearing is normal bilaterally. Palate is midline with normal sternocleidomastoid and trapezius muscles are normal. Tongue is midline. Motor:  5/5 strength in upper and lower proximal and distal muscles. Normal bulk and tone. No fasciculations. Reflexes:   Deep tendon reflexes 2+/4 and symmetrical.   Sensory:   Normal to touch, pinprick and vibration. Gait:  Normal gait. Tremor:   No tremor noted. Cerebellar:  No cerebellar signs present. Neurovascular:  Normal heart sounds and regular rhythm, peripheral pulses intact, and no carotid bruits. Assessment:   Moderate plus grade dementia. Lately her mood and behavior is exceeding the possibilities of the caregiver at home. Family will need to make a decision as to whether she fits a nursing home scenario or not. I mentioned drug Seroquel but it is not go to be a miracle drug get any sense of the word and I explained to the caregiver it is a black boxed drug to increased vascular events. No  promises made as to it working and the drug is not categorically designated for such application by label.       Plan:     Signed by :  Jacque Clarke MD

## 2019-03-14 NOTE — PATIENT INSTRUCTIONS
[facility]   Statement to Patients  April 1, 2014      In an effort to ensure the large volume of patient prescription refills is processed in the most efficient and expeditious manner, we are asking our patients to assist us by calling your Pharmacy for all prescription refills, this will include also your  Mail Order Pharmacy. The pharmacy will contact our office electronically to continue the refill process. Please do not wait until the last minute to call your pharmacy. We need at least 48 hours (2days) to fill prescriptions. We also encourage you to call your pharmacy before going to  your prescription to make sure it is ready. With regard to controlled substance prescription refill requests (narcotic refills) that need to be picked up at our office, we ask your cooperation by providing us with at least 72 hours (3days) notice that you will need a refill. We will not refill narcotic prescription refill requests after 4:00pm on any weekday, Monday through Thursday, or after 2:00pm on Fridays, or on the weekends. We encourage everyone to explore another way of getting your prescription refill request processed using Neura, our patient web portal through our electronic medical record system. Neura is an efficient and effective way to communicate your medication request directly to the office and  downloadable as an amador on your smart phone . Neura also features a review functionality that allows you to view your medication list as well as leave messages for your physician. Are you ready to get connected? If so please review the attatched instructions or speak to any of our staff to get you set up right away! Thank you so much for your cooperation. Should you have any questions please contact our Practice Administrator.     The Physicians and Staff,  [facility]                    Helping A Person With Dementia: Care Instructions  Your Care Instructions    Dementia is a loss of mental skills that affects daily life. It is different from mild memory loss that occurs with aging. Dementia can cause problems with memory, thinking clearly, and planning. It is different for everyone. But it usually gets worse slowly. Some people who have dementia can function well for a long time. But at some point it may become hard for the person to care for himself or herself. It can be upsetting to learn that a loved one has this condition. You may be afraid and worried about what will happen. You may wonder how you will care for the person. There is no cure for dementia. But medicine may be able to slow memory loss and improve thinking for a while. Other medicines may help with sleep, depression, and behavior changes. Dementia is different for everyone. In some cases, people can function well for a long time. You can help your loved one by making his or her home life easier and safer. You also need to take care of yourself. Caregiving can be stressful. But support is available to help you and give you a break when you need it. The Alzheimer's Association offers good information and support. If you are caring for someone with dementia, you can help make life safer and more comfortable. You can also help your loved one make decisions about future care. You may also want to bring up legal and financial issues. These are hard but important conversations to have. Follow-up care is a key part of your loved one's treatment and safety. Be sure to make and go to all appointments, and call your doctor if your loved one is having problems. It's also a good idea to know your loved one's test results and keep a list of the medicines he or she takes. How can you care for your loved one at home? Taking care of the person  · If the person takes medicine for dementia, help him or her take it exactly as prescribed.  Call the doctor if you notice any problems with the medicine. · Make a list of the person's medicines. Review it with all of his or her doctors. · Help the person eat a balanced diet. Serve plenty of whole grains, fruits, and vegetables every day. If the person is not hungry at mealtimes, give snacks at midmorning and in the afternoon. Offer drinks such as Boost, Ensure, or Sustacal if the person is losing weight. · Encourage exercise. Walking and other activities may slow the decline of mental ability. Help the person stay active mentally with reading, crossword puzzles, or other hobbies. · Take steps to help if the person is sundowning. This is the restless behavior and trouble with sleeping that may occur in late afternoon and at night. Try not to let the person nap during the day. Offer a glass of warm milk or caffeine-free tea before bedtime. · Develop a routine. Your loved one will feel less frustrated or confused with a clear, simple plan of what to do every day. · Be patient. A task may take the person longer than it used to. · For as long as he or she is able, allow your loved one to make decisions about activities, food, clothing, and other choices. Let him or her be independent, even if tasks take more time or are not done perfectly. Tailor tasks to the person's abilities. For example, if cooking is no longer safe, ask for other help. Your loved one can help set the table, or make simple dishes such as a salad. When the person needs help, offer it gently. Staying safe  · Make your home (or your loved one's home) safe. Tack down rugs, and put no-slip tape in the tub. Install handrails, and put safety switches on stoves and appliances. Keep rooms free of clutter. Make sure walkways around furniture are clear. Do not move furniture around, because the person may become confused. · Use locks on doors and cupboards. Lock up knives, scissors, medicines, cleaning supplies, and other dangerous things.   · Do not let the person drive or cook if he or she can't do it safely. A person with dementia should not drive unless he or she is able to pass an on-road driving test. Your state 's license bureau can do a driving test if there is any question. · Get medical alert jewelry for the person so that you can be contacted if he or she wanders away. If possible, provide a safe place for wandering, such as an enclosed yard or garden. Taking care of yourself  · Ask your doctor about support groups and other resources in your area. · Take care of your health. Be sure to eat healthy foods and get enough rest and exercise. · Take time for yourself. Respite services provide someone to stay with the person for a short time while you get out of the house for a few hours. · Make time for an activity that you enjoy. Read, listen to music, paint, do crafts, or play an instrument, even if it's only for a few minutes a day. · Spend time with family, friends, and others in your support system. When should you call for help? Call 911 anytime you think the person may need emergency care. For example, call if:    · The person who has dementia wanders away and you can't find him or her.     · The person who has dementia is seriously injured.    Call the doctor now or seek immediate medical care if:    · The person suddenly sees things that are not there (hallucinates).     · The person has a sudden change in his or her behavior.    Watch closely for changes in the person's health, and be sure to contact the doctor if:    · The person has symptoms that could cause injury.     · The person has problems with his or her medicine.     · You need more information to care for a person with dementia.     · You need respite care so you can take a break. Where can you learn more? Go to http://kelsie-kerwin.info/. Enter Z584 in the search box to learn more about \"Helping A Person With Dementia: Care Instructions. \"  Current as of: September 11, 2018  Content Version: 11.9  © 8902-0242 [employer]. Care instructions adapted under license by [employer] (which disclaims liability or warranty for this information). If you have questions about a medical condition or this instruction, always ask your healthcare professional. [address] any warranty or liability for your use of this information. Patient history reviewed and patient examined. Only there is the emergence of problematic behavior and mood. Seroquel may help dampen some of the agitation and aggression. However it is black boxed for increased vascular events. It may be at this time that the ability to keep her home exceeds the capabilities of the caregiver. That is a decision based on caregiver and family experience up to this point. Revisit will be predicated on what transpires with all of her mood and behavior and appetite enhancement.

## 2019-03-18 ENCOUNTER — OFFICE VISIT (OUTPATIENT)
Dept: FAMILY MEDICINE CLINIC | Age: 74
End: 2019-03-18

## 2019-03-18 VITALS
OXYGEN SATURATION: 96 % | TEMPERATURE: 97.9 F | RESPIRATION RATE: 16 BRPM | BODY MASS INDEX: 26.03 KG/M2 | HEART RATE: 81 BPM | SYSTOLIC BLOOD PRESSURE: 110 MMHG | HEIGHT: 59 IN | DIASTOLIC BLOOD PRESSURE: 72 MMHG | WEIGHT: 129.1 LBS

## 2019-03-18 DIAGNOSIS — F20.5 RESIDUAL SCHIZOPHRENIA (HCC): ICD-10-CM

## 2019-03-18 DIAGNOSIS — F02.80 ALZHEIMER'S DISEASE OF OTHER ONSET WITHOUT BEHAVIORAL DISTURBANCE: ICD-10-CM

## 2019-03-18 DIAGNOSIS — I10 ESSENTIAL HYPERTENSION: Primary | ICD-10-CM

## 2019-03-18 DIAGNOSIS — S43.015S: ICD-10-CM

## 2019-03-18 DIAGNOSIS — G30.8 ALZHEIMER'S DISEASE OF OTHER ONSET WITHOUT BEHAVIORAL DISTURBANCE: ICD-10-CM

## 2019-03-18 NOTE — PROGRESS NOTES
Chief Complaint   Patient presents with    Hypertension    Dementia    Decreased Appetite     1. Have you been to the ER, urgent care clinic since your last visit? Hospitalized since your last visit? No    2. Have you seen or consulted any other health care providers outside of the [address] [place] [address] since your last visit? Include any pap smears or colon screening. No        Chief Complaint   Patient presents with    Hypertension    Dementia    Decreased Appetite     She is a 68 y.o. female who presents for evalution. Reviewed PmHx, RxHx, FmHx, SocHx, AllgHx and updated and dated in the chart. Patient Active Problem List    Diagnosis    Chronic pain of both knees    Advance care planning     POA      Alzheimer's disease    Syncope and collapse    Anxiety    Edema    Snoring    Cough    Constipation    Memory loss    Dementia in conditions classified elsewhere without behavioral disturbance    [facility]ia ([facility]    Mental retardation    Hypercholesteremia    HTN (hypertension)    Mental disability    DJD (degenerative joint disease)       Review of Systems - negative except as listed above in the HPI    Objective:     Vitals:    03/18/19 0942   BP: 110/72   Pulse: 81   Resp: 16   Temp: 97.9 °F (36.6 °C)   TempSrc: Oral   SpO2: 96%   Weight: 129 lb 1.6 oz (58.6 kg)   Height: 4' 11\" (1.499 m)     Physical Examination: General appearance - alert, well appearing, and in no distress  Chest - clear to auscultation, no wheezes, rales or rhonchi, symmetric air entry  Heart - normal rate, regular rhythm, normal S1, S2, no murmurs, rubs, clicks or gallops  Abdomen - soft, nontender, nondistended, no masses or organomegaly      Assessment/ Plan:   Diagnoses and all orders for this visit:    1. Essential hypertension  -at goal  -wt is stable    2. Alzheimer's disease of other onset without behavioral disturbance  -     OTHER; PT and OT for walking and balance  DX: Falls    3.  Residual schizophrenia ([facility]  -stable  -dw caretaker NHP    4. Dislocation, shoulder, anterior, left, sequela  -     OTHER; PT and OT for walking and balance  DX: Falls       Follow-up Disposition:  Return if symptoms worsen or fail to improve. I have discussed the diagnosis with the patient and the intended plan as seen in the above orders. The patient understands and agrees with the plan. The patient has received an after-visit summary and questions were answered concerning future plans. Medication Side Effects and Warnings were discussed with patient  Patient Labs were reviewed and or requested:  Patient Past Records were reviewed and or requested    Jaun Nogueira M.D. There are no Patient Instructions on file for this visit.

## 2019-03-20 ENCOUNTER — TELEPHONE (OUTPATIENT)
Dept: NEUROLOGY | Age: 74
End: 2019-03-20

## 2019-03-20 NOTE — TELEPHONE ENCOUNTER
Call placed to [employer] to initiate an appeal for Namenda XR 28 mg coverage.     Namenda XR 28 mg approved    Case #[id number]  Eff: 1/1/19-3/19/20

## 2019-03-21 RX ORDER — PERPHENAZINE 2 MG/1
TABLET, FILM COATED ORAL
Qty: 60 TAB | Refills: 1 | Status: SHIPPED | OUTPATIENT
Start: 2019-03-21 | End: 2019-05-19 | Stop reason: SDUPTHER

## 2019-05-19 RX ORDER — PERPHENAZINE 2 MG/1
TABLET, FILM COATED ORAL
Qty: 60 TAB | Refills: 1 | Status: SHIPPED | OUTPATIENT
Start: 2019-05-19 | End: 2019-07-14 | Stop reason: SDUPTHER

## 2019-07-15 RX ORDER — PERPHENAZINE 2 MG/1
TABLET, FILM COATED ORAL
Qty: 60 TAB | Refills: 1 | Status: SHIPPED | OUTPATIENT
Start: 2019-07-15 | End: 2019-09-09 | Stop reason: SDUPTHER

## 2019-07-16 ENCOUNTER — TELEPHONE (OUTPATIENT)
Dept: NEUROLOGY | Age: 74
End: 2019-07-16

## 2019-07-16 RX ORDER — MEMANTINE HYDROCHLORIDE 28 MG/1
28 CAPSULE, EXTENDED RELEASE ORAL DAILY
Qty: 90 CAP | Refills: 3 | Status: SHIPPED | OUTPATIENT
Start: 2019-07-16 | End: 2020-07-17 | Stop reason: SDUPTHER

## 2019-08-12 RX ORDER — BENZTROPINE MESYLATE 1 MG/1
TABLET ORAL
Qty: 180 TAB | Refills: 2 | Status: SHIPPED | OUTPATIENT
Start: 2019-08-12 | End: 2020-04-24

## 2019-08-13 DIAGNOSIS — I10 ESSENTIAL HYPERTENSION: ICD-10-CM

## 2019-08-13 RX ORDER — LISINOPRIL 5 MG/1
TABLET ORAL
Qty: 90 TAB | Refills: 1 | Status: SHIPPED | OUTPATIENT
Start: 2019-08-13 | End: 2020-02-07

## 2019-08-15 DIAGNOSIS — E78.00 HYPERCHOLESTEREMIA: ICD-10-CM

## 2019-08-15 RX ORDER — SIMVASTATIN 40 MG/1
TABLET, FILM COATED ORAL
Qty: 90 TAB | Refills: 1 | Status: SHIPPED | OUTPATIENT
Start: 2019-08-15 | End: 2020-03-20

## 2019-08-23 ENCOUNTER — HOSPITAL ENCOUNTER (OUTPATIENT)
Dept: MAMMOGRAPHY | Age: 74
Discharge: HOME | End: 2019-08-23
Attending: FAMILY MEDICINE
Payer: MEDICARE

## 2019-08-23 DIAGNOSIS — Z12.39 SCREENING BREAST EXAMINATION: ICD-10-CM

## 2019-08-23 PROCEDURE — 77067 SCR MAMMO BI INCL CAD: CPT

## 2019-09-09 RX ORDER — PERPHENAZINE 2 MG/1
TABLET, FILM COATED ORAL
Qty: 60 TAB | Refills: 1 | Status: SHIPPED | OUTPATIENT
Start: 2019-09-09 | End: 2019-10-22 | Stop reason: SDUPTHER

## 2019-09-18 RX ORDER — RIVASTIGMINE 9.5 MG/24H
PATCH, EXTENDED RELEASE TRANSDERMAL
Qty: 90 PATCH | Refills: 3 | Status: SHIPPED | OUTPATIENT
Start: 2019-09-18 | End: 2020-07-19 | Stop reason: SDUPTHER

## 2019-09-23 ENCOUNTER — OFFICE VISIT (OUTPATIENT)
Dept: FAMILY MEDICINE CLINIC | Age: 74
End: 2019-09-23

## 2019-09-23 ENCOUNTER — HOSPITAL ENCOUNTER (OUTPATIENT)
Dept: LAB | Age: 74
Discharge: HOME | End: 2019-09-23
Payer: MEDICARE

## 2019-09-23 VITALS
TEMPERATURE: 97.3 F | RESPIRATION RATE: 17 BRPM | HEART RATE: 76 BPM | OXYGEN SATURATION: 95 % | WEIGHT: 131 LBS | HEIGHT: 59 IN | DIASTOLIC BLOOD PRESSURE: 76 MMHG | SYSTOLIC BLOOD PRESSURE: 122 MMHG | BODY MASS INDEX: 26.41 KG/M2

## 2019-09-23 DIAGNOSIS — Z00.00 ROUTINE GENERAL MEDICAL EXAMINATION AT A HEALTH CARE FACILITY: Primary | ICD-10-CM

## 2019-09-23 DIAGNOSIS — I10 ESSENTIAL HYPERTENSION: ICD-10-CM

## 2019-09-23 DIAGNOSIS — E78.00 HYPERCHOLESTEREMIA: ICD-10-CM

## 2019-09-23 DIAGNOSIS — F20.5 RESIDUAL SCHIZOPHRENIA (HCC): ICD-10-CM

## 2019-09-23 DIAGNOSIS — F02.80 ALZHEIMER'S DISEASE OF OTHER ONSET WITHOUT BEHAVIORAL DISTURBANCE: ICD-10-CM

## 2019-09-23 DIAGNOSIS — Z23 ENCOUNTER FOR IMMUNIZATION: ICD-10-CM

## 2019-09-23 DIAGNOSIS — G30.8 ALZHEIMER'S DISEASE OF OTHER ONSET WITHOUT BEHAVIORAL DISTURBANCE: ICD-10-CM

## 2019-09-23 DIAGNOSIS — R73.9 ELEVATED BLOOD SUGAR: ICD-10-CM

## 2019-09-23 PROCEDURE — 36415 COLL VENOUS BLD VENIPUNCTURE: CPT

## 2019-09-23 PROCEDURE — 80053 COMPREHEN METABOLIC PANEL: CPT

## 2019-09-23 PROCEDURE — 84443 ASSAY THYROID STIM HORMONE: CPT

## 2019-09-23 PROCEDURE — 85025 COMPLETE CBC W/AUTO DIFF WBC: CPT

## 2019-09-23 PROCEDURE — 83036 HEMOGLOBIN GLYCOSYLATED A1C: CPT

## 2019-09-23 PROCEDURE — 80061 LIPID PANEL: CPT

## 2019-09-23 NOTE — PROGRESS NOTES
Chief Complaint   Patient presents with   [place] Wellness Visit     [facility]    Labs    Diabetes     1. Have you been to the ER, urgent care clinic since your last visit? Hospitalized since your last visit? No    2. Have you seen or consulted any other health care providers outside of the [address] [place] [address] since your last visit? Include any pap smears or colon screening. Ofelia Rojas  9/23/2019  Provider:   [place]:  Diabetes Report Card   1) Have you seen the eye doctor in past year?yes    2) How would you  rate your Diabetic Diet? NOT Eating well   3) How well do you take care of your feet? Selfcare   4) Do you keep your Primary Care Follow Up Appts? yes    5) Do you know your A1C goal?no    6) Do you take your medications daily? yes    7) Do you check your blood sugars? yes    8) Have you gained weight?no       9) Do you follow an exercise program?no    10) Can you do better?no      Lab Results   Component Value Date/Time    Cholesterol, total 155 09/17/2018 10:15 AM    HDL Cholesterol 49 09/17/2018 10:15 AM    LDL, calculated 78 09/17/2018 10:15 AM    Triglyceride 139 09/17/2018 10:15 AM    CHOL/HDL Ratio 3.4 11/17/2009 10:22 AM     Lab Results   Component Value Date/Time    Hemoglobin A1c 5.5 03/08/2018 09:17 AM    Hemoglobin A1c 5.8 (H) 10/01/2013 06:21 PM    Hemoglobin A1c 5.9 (H) 08/15/2012 08:36 AM    Glucose 92 09/17/2018 10:15 AM    LDL, calculated 78 09/17/2018 10:15 AM    Creatinine 0.92 09/17/2018 10:15 AM          Medicare Wellness Exam:    Chief Complaint   Patient presents with    Annual Wellness Visit     [facility] Diabetes     she is a 68y.o. year old female who presents for evaluation for their Medicare Wellness Visit.     Fall Screen is completed and assessed=yes  Depression Screen is completed and assessed=yes  Medication list reviewed and adjusted for accuracy=yes  Immunizations reviewed and updated=yes  Health/Preventative Screenings reviewed and updated=yes  ADL Functions reviewed=yes    Patient Active Problem List    Diagnosis    Chronic pain of both knees    Advance care planning     POA      Alzheimer's disease    Syncope and collapse    Anxiety    Edema    Snoring    Cough    Constipation    Memory loss    Dementia in conditions classified elsewhere without behavioral disturbance    Schizophrenia ([facility]    Mental retardation    Hypercholesteremia    HTN (hypertension)    Mental disability    DJD (degenerative joint disease)       Reviewed PmHx, RxHx, FmHx, SocHx, AllgHx and updated and dated in the chart. Review of Systems - negative except as listed above in the HPI    Objective:     Vitals:    09/23/19 1013   BP: 122/76   Pulse: 76   Resp: 17   Temp: 97.3 °F (36.3 °C)   TempSrc: Oral   SpO2: 95%   Weight: 131 lb (59.4 kg)   Height: 4' 11\" (1.499 m)     Physical Examination: General appearance - alert, well appearing, and in no distress  Nose - normal and patent, no erythema, discharge or polyps  Mouth - mucous membranes moist, pharynx normal without lesions  Neck - supple, no significant adenopathy  Chest - clear to auscultation, no wheezes, rales or rhonchi, symmetric air entry  Heart - normal rate, regular rhythm, normal S1, S2, no murmurs, rubs, clicks or gallops  Abdomen - soft, nontender, nondistended, no masses or organomegaly  Extremities - peripheral pulses normal, no pedal edema, no clubbing or cyanosis    Assessment/ Plan:   Diagnoses and all orders for this visit:    1. Routine general medical examination at a health care facility  -see below    2. Elevated blood sugar  -     LIPID PANEL  -     METABOLIC PANEL, COMPREHENSIVE  -     CBC WITH AUTOMATED DIFF  -     TSH 3RD GENERATION  -     HEMOGLOBIN A1C WITH EAG    3. Essential hypertension  -     LIPID PANEL  -     METABOLIC PANEL, COMPREHENSIVE  -     CBC WITH AUTOMATED DIFF  -     TSH 3RD GENERATION  -     HEMOGLOBIN A1C WITH EAG  -at goal    4.  Hypercholesteremia  -     LIPID PANEL  - METABOLIC PANEL, COMPREHENSIVE  -     CBC WITH AUTOMATED DIFF  -     TSH 3RD GENERATION  -     HEMOGLOBIN A1C WITH EAG    5. Residual schizophrenia (HCC)  -stable    6. Alzheimer's disease of other onset without behavioral disturbance  -stable    7. Encounter for immunization  -     INFLUENZA VACCINE INACTIVATED (IIV), SUBUNIT, ADJUVANTED, IM  -     PNEUMOCOCCAL CONJ VACCINE 13 VALENT IM  -     ADMIN PNEUMOCOCCAL VACCINE  -     ADMIN INFLUENZA VIRUS VAC         -Pain evaluation performed in office  -Cognitive Screen performed in office  -Depression Screen, Fall risks (by up and go test)  and ADL functionality were addressed  -Medication list updated and reviewed for any changes   -A comprehensive review of medical issues and a plan was formulated  -End of life planning was addressed with pt   -Health Screenings for preventions were addressed and a plan was formulated  -Shingles Vaccine was recommended  -Discussed with patient cancer risk factors and appropriate screenings for age  -Patient evaluated for colonoscopy and referred if needed per screeing criteria  -Labs from previous visits were discussed with patient   -Discussed with patient diet and exercise and formulated a plan as needed  -An Advanced care plan was developed with the patient.  -Alcohol screening performed and was negative    -  Follow-up and Dispositions    · Return in about 1 year (around 9/23/2020). I have discussed the diagnosis with the patient and the intended plan as seen in the above orders. The patient understands and agrees with the plan. The patient has received an after-visit summary and questions were answered concerning future plans. Medication Side Effects and Warnings were discussed with patien  Patient Labs were reviewed and or requested  Patient Past Records were reviewed and or requested    There are no Patient Instructions on file for this visit.       Aye Dickerson M.D.

## 2019-09-24 LAB
ALBUMIN SERPL-MCNC: 4.3 G/DL (ref 3.5–4.8)
ALBUMIN/GLOB SERPL: 2 {RATIO} (ref 1.2–2.2)
ALP SERPL-CCNC: 100 IU/L (ref 39–117)
ALT SERPL-CCNC: 12 IU/L (ref 0–32)
AST SERPL-CCNC: 17 IU/L (ref 0–40)
BASOPHILS # BLD AUTO: 0.1 X10E3/UL (ref 0–0.2)
BASOPHILS NFR BLD AUTO: 1 %
BILIRUB SERPL-MCNC: 0.2 MG/DL (ref 0–1.2)
BUN SERPL-MCNC: 17 MG/DL (ref 8–27)
BUN/CREAT SERPL: 19 (ref 12–28)
CALCIUM SERPL-MCNC: 9.7 MG/DL (ref 8.7–10.3)
CHLORIDE SERPL-SCNC: 98 MMOL/L (ref 96–106)
CHOLEST SERPL-MCNC: 145 MG/DL (ref 100–199)
CO2 SERPL-SCNC: 22 MMOL/L (ref 20–29)
CREAT SERPL-MCNC: 0.88 MG/DL (ref 0.57–1)
EOSINOPHIL # BLD AUTO: 0.1 X10E3/UL (ref 0–0.4)
EOSINOPHIL NFR BLD AUTO: 1 %
ERYTHROCYTE [DISTWIDTH] IN BLOOD BY AUTOMATED COUNT: 12.7 % (ref 12.3–15.4)
EST. AVERAGE GLUCOSE BLD GHB EST-MCNC: 111 MG/DL
GLOBULIN SER CALC-MCNC: 2.1 G/DL (ref 1.5–4.5)
GLUCOSE SERPL-MCNC: 153 MG/DL (ref 65–99)
HBA1C MFR BLD: 5.5 % (ref 4.8–5.6)
HCT VFR BLD AUTO: 37.1 % (ref 34–46.6)
HDLC SERPL-MCNC: 42 MG/DL
HGB BLD-MCNC: 11.8 G/DL (ref 11.1–15.9)
IMM GRANULOCYTES # BLD AUTO: 0.1 X10E3/UL (ref 0–0.1)
IMM GRANULOCYTES NFR BLD AUTO: 1 %
INTERPRETATION, 910389: NORMAL
LDLC SERPL CALC-MCNC: 67 MG/DL (ref 0–99)
LYMPHOCYTES # BLD AUTO: 1.2 X10E3/UL (ref 0.7–3.1)
LYMPHOCYTES NFR BLD AUTO: 13 %
MCH RBC QN AUTO: 28.8 PG (ref 26.6–33)
MCHC RBC AUTO-ENTMCNC: 31.8 G/DL (ref 31.5–35.7)
MCV RBC AUTO: 91 FL (ref 79–97)
MONOCYTES # BLD AUTO: 0.9 X10E3/UL (ref 0.1–0.9)
MONOCYTES NFR BLD AUTO: 9 %
NEUTROPHILS # BLD AUTO: 7.2 X10E3/UL (ref 1.4–7)
NEUTROPHILS NFR BLD AUTO: 75 %
PLATELET # BLD AUTO: 341 X10E3/UL (ref 150–450)
POTASSIUM SERPL-SCNC: 4.6 MMOL/L (ref 3.5–5.2)
PROT SERPL-MCNC: 6.4 G/DL (ref 6–8.5)
RBC # BLD AUTO: 4.1 X10E6/UL (ref 3.77–5.28)
SODIUM SERPL-SCNC: 139 MMOL/L (ref 134–144)
TRIGL SERPL-MCNC: 181 MG/DL (ref 0–149)
TSH SERPL DL<=0.005 MIU/L-ACNC: 2.37 UIU/ML (ref 0.45–4.5)
VLDLC SERPL CALC-MCNC: 36 MG/DL (ref 5–40)
WBC # BLD AUTO: 9.5 X10E3/UL (ref 3.4–10.8)

## 2019-09-24 NOTE — PROGRESS NOTES
After reviewing your labs, I believe they are within normal  limits for your age. Keep working hard on diet and taking your medications that are prescribed. If you have any acute care needs and are having trouble getting an appointment. .. please send me a   [facility] message or have the  send me a message. Have a blessed day and  be kind  to others! If you have any questions, feel free to email thru [facility], or give us   a call back at [phone]. Nic Linares M.D.   Good Help to Those in Need  \"You maybe whatever you resolve to be\"

## 2019-10-22 RX ORDER — PERPHENAZINE 2 MG/1
TABLET, FILM COATED ORAL
Qty: 180 TAB | Refills: 1 | Status: SHIPPED | OUTPATIENT
Start: 2019-10-22 | End: 2020-04-22

## 2019-11-07 ENCOUNTER — OFFICE VISIT (OUTPATIENT)
Dept: NEUROLOGY | Age: 74
End: 2019-11-07

## 2019-11-07 VITALS
SYSTOLIC BLOOD PRESSURE: 138 MMHG | OXYGEN SATURATION: 97 % | DIASTOLIC BLOOD PRESSURE: 65 MMHG | RESPIRATION RATE: 18 BRPM | WEIGHT: 131 LBS | HEART RATE: 75 BPM | HEIGHT: 59 IN | BODY MASS INDEX: 26.41 KG/M2

## 2019-11-07 DIAGNOSIS — F02.80 ALZHEIMER'S DEMENTIA WITHOUT BEHAVIORAL DISTURBANCE, UNSPECIFIED TIMING OF DEMENTIA ONSET: Primary | ICD-10-CM

## 2019-11-07 DIAGNOSIS — G30.9 ALZHEIMER'S DEMENTIA WITHOUT BEHAVIORAL DISTURBANCE, UNSPECIFIED TIMING OF DEMENTIA ONSET: Primary | ICD-10-CM

## 2019-11-07 NOTE — PATIENT INSTRUCTIONS
Patient history reviewed patient examined. Will recommend continuation of medicines as they are and can respect the learning on the job lessons taught and day by day experiences with patient care. Hopefully the drug Nuplazid will find its noon each with psychosis and Alzheimer's disease type dementia.

## 2019-11-07 NOTE — PROGRESS NOTES
Neurology Consult      Subjective: Mitesh Gore is a 76 y.o. female who comes in today with family. Has moderate plus grade dementia and is on baseline Namenda XR and Exelon patch. The caregiver has very truly learned on the job what works and does not work with her supervision and day by day application to eating sleeping mood and behavior management dressing and other basic ADLs. Gets an occasional facial expression of agitation but it does not turn into a physical altercation. At night when she goes to bed it is a rather simplistic process thank goodness and does talk to herself and others in the room for what it is worth. I shared that the drug Nuplazid I believe is on the fast track to being approved outside of Parkinson's disease for psychosis such as Alzheimer's Lewy body dementia frontotemporal dementia among others. No new medical or surgical history for her although she is headed to the dermatologist because she does have a fair number of moles but has seldom sun exposure. I thought she was at her baseline and I will see her back in 6 months. Current Outpatient Medications   Medication Sig Dispense Refill    perphenazine (TRILAFON) 2 mg tablet TAKE 1 TABLET BY MOUTH TWICE A  Tab 1    rivastigmine (EXELON) 9.5 mg/24 hr patch APPLY ONE PATCH TO THE SKIN DAILY. 90 Patch 3    simvastatin (ZOCOR) 40 mg tablet TAKE 1 TABLET BY MOUTH EVERYDAY AT BEDTIME 90 Tab 1    lisinopril (PRINIVIL, ZESTRIL) 5 mg tablet TAKE 1 TABLET BY MOUTH EVERY DAY 90 Tab 1    benztropine (COGENTIN) 1 mg tablet TAKE ONE TABLET BY MOUTH TWICE A DAY. GENERIC FOR COGENTIN 180 Tab 2    NAMENDA XR 28 mg capsule Take 1 Cap by mouth daily. Indications: Moderate to Severe Alzheimer's Type Dementia 90 Cap 3    nystatin (MYCOSTATIN) powder Apply  to affected area four (4) times daily. 1 Bottle 5    triamcinolone acetonide (KENALOG) 0.1 % topical cream Apply  to affected area daily.  To lip 15 g 0    OTHER PT and OT for walking and balance  DX: Falls 30 Each 0      No Known Allergies  Past Medical History:   Diagnosis Date    Anxiety     Constipation     Cough     Degenerative joint disease of knee     both knees    Edema     Hypercholesterolemia     Hypertension     Memory loss     MR (mental retardation)     Snoring       Past Surgical History:   Procedure Laterality Date    COLONOSCOPY  2008    neg    HX BREAST BIOPSY Right 2012    neg; stereotactic bx    HX CATARACT REMOVAL        Social History     Socioeconomic History    Marital status: SINGLE     Spouse name: Not on file    Number of children: Not on file    Years of education: Not on file    Highest education level: Not on file   Occupational History    Not on file   Social Needs    Financial resource strain: Not on file    Food insecurity:     Worry: Not on file     Inability: Not on file    Transportation needs:     Medical: Not on file     Non-medical: Not on file   Tobacco Use    Smoking status: Never Smoker    Smokeless tobacco: Never Used   Substance and Sexual Activity    Alcohol use: No    Drug use: No    Sexual activity: Not Currently   Lifestyle    Physical activity:     Days per week: Not on file     Minutes per session: Not on file    Stress: Not on file   Relationships    Social connections:     Talks on phone: Not on file     Gets together: Not on file     Attends Nondenominational service: Not on file     Active member of club or organization: Not on file     Attends meetings of clubs or organizations: Not on file     Relationship status: Not on file    Intimate partner violence:     Fear of current or ex partner: Not on file     Emotionally abused: Not on file     Physically abused: Not on file     Forced sexual activity: Not on file   Other Topics Concern    Not on file   Social History Narrative    Not on file      Family History   Problem Relation Age of Onset    Heart Disease Mother     Hypertension Mother    Ramos Elevated Lipids Mother     Hypertension Sister    Shai Hoose Elevated Lipids Sister     Breast Cancer Sister 77    Elevated Lipids Brother     Hypertension Brother     Elevated Lipids Sister     Hypertension Sister       Visit Vitals  /65   Pulse 75   Resp 18   Ht 4' 11\" (1.499 m)   Wt 59.4 kg (131 lb)   SpO2 97%   BMI 26.46 kg/m²        Review of Systems:   A comprehensive review of systems was negative except for that written in the HPI. Neuro Exam:     Appearance: The patient is well developed, well nourished, and unable to provide a coherent history and is in no acute distress. Mental Status: Oriented to name by inference. Mood and affect appropriate to her baseline which means sitting with her head down very seldom eye contact and basically nonverbal..   Cranial Nerves:   Intact visual fields? Fundi are benign. ARNALDO, EOM's full, no nystagmus, no ptosis. Facial sensation is normal. Corneal reflexes are intact. Facial movement is symmetric. Hearing is normal bilaterally. Palate is midline with normal sternocleidomastoid and trapezius muscles are normal. Tongue is midline. Motor:  5/5 strength in upper and lower proximal and distal muscles. Normal bulk and tone. No fasciculations. Reflexes:   Deep tendon reflexes 2+/4 and symmetrical.   Sensory:   Normal to touch, pinprick and vibration. Gait:  Normal gait. Tremor:   No tremor noted. Cerebellar:  No cerebellar signs present. Neurovascular:  Normal heart sounds and regular rhythm, peripheral pulses intact, and no carotid bruits. Assessment:   Moderate plus grade dementia. I respect the caregivers learning on the job and I am sure further lessons will transpire. Continue with the Namenda and the Exelon patch as is. I really am hoping the drug Nuplazid will find its way into the market as an FDA sanctioned drug for psychosis. Revisit 6 months. Plan:   Revisit 6 months.   Signed by :  Jett Lane MD

## 2019-11-07 NOTE — LETTER
11/7/19 Patient: Ximena Bustos YOB: 1945 Date of Visit: 11/7/2019 Adelso De La O MD 
[address] [address] [place] [address] VIA In Basket Dear Adelso De La O MD, Thank you for referring Ms. Ximena Bustos to [facility] for evaluation. My notes for this consultation are attached. If you have questions, please do not hesitate to call me. I look forward to following your patient along with you.  
 
 
Sincerely, 
 
Kristi Lainez MD

## 2019-12-31 ENCOUNTER — DOCUMENTATION ONLY (OUTPATIENT)
Dept: FAMILY MEDICINE CLINIC | Age: 74
End: 2019-12-31

## 2019-12-31 NOTE — PROGRESS NOTES
Home [facility] CMN for durable medical was signed & faxed to [phone],ok,Copy placed in scan folder to be scanned to chart.

## 2020-02-06 DIAGNOSIS — I10 ESSENTIAL HYPERTENSION: ICD-10-CM

## 2020-02-07 RX ORDER — LISINOPRIL 5 MG/1
TABLET ORAL
Qty: 90 TAB | Refills: 1 | Status: SHIPPED | OUTPATIENT
Start: 2020-02-07 | End: 2020-07-19 | Stop reason: SDUPTHER

## 2020-03-20 DIAGNOSIS — E78.00 HYPERCHOLESTEREMIA: ICD-10-CM

## 2020-03-20 RX ORDER — SIMVASTATIN 40 MG/1
TABLET, FILM COATED ORAL
Qty: 90 TAB | Refills: 1 | Status: SHIPPED | OUTPATIENT
Start: 2020-03-20 | End: 2020-07-19 | Stop reason: SDUPTHER

## 2020-04-22 RX ORDER — PERPHENAZINE 2 MG/1
TABLET, FILM COATED ORAL
Qty: 180 TAB | Refills: 1 | Status: SHIPPED | OUTPATIENT
Start: 2020-04-22 | End: 2020-09-11 | Stop reason: SDUPTHER

## 2020-04-24 RX ORDER — BENZTROPINE MESYLATE 1 MG/1
TABLET ORAL
Qty: 180 TAB | Refills: 2 | Status: SHIPPED | OUTPATIENT
Start: 2020-04-24 | End: 2020-12-14

## 2020-07-17 ENCOUNTER — OFFICE VISIT (OUTPATIENT)
Dept: NEUROLOGY | Age: 75
End: 2020-07-17

## 2020-07-17 VITALS
RESPIRATION RATE: 18 BRPM | SYSTOLIC BLOOD PRESSURE: 100 MMHG | OXYGEN SATURATION: 98 % | HEIGHT: 59 IN | BODY MASS INDEX: 25.7 KG/M2 | DIASTOLIC BLOOD PRESSURE: 66 MMHG | HEART RATE: 70 BPM | TEMPERATURE: 97.4 F | WEIGHT: 127.5 LBS

## 2020-07-17 DIAGNOSIS — F03.90 DEMENTIA WITHOUT BEHAVIORAL DISTURBANCE, UNSPECIFIED DEMENTIA TYPE: Primary | ICD-10-CM

## 2020-07-17 DIAGNOSIS — I10 ESSENTIAL HYPERTENSION: ICD-10-CM

## 2020-07-17 DIAGNOSIS — E78.00 HYPERCHOLESTEREMIA: ICD-10-CM

## 2020-07-17 RX ORDER — MEMANTINE HYDROCHLORIDE 28 MG/1
28 CAPSULE, EXTENDED RELEASE ORAL DAILY
Qty: 90 CAP | Refills: 3 | Status: SHIPPED | OUTPATIENT
Start: 2020-07-17 | End: 2021-01-21 | Stop reason: SDUPTHER

## 2020-07-17 RX ORDER — RIVASTIGMINE 9.5 MG/24H
PATCH, EXTENDED RELEASE TRANSDERMAL
Qty: 90 PATCH | Refills: 3 | Status: SHIPPED | OUTPATIENT
Start: 2020-07-17 | End: 2021-01-21 | Stop reason: SDUPTHER

## 2020-07-17 NOTE — PATIENT INSTRUCTIONS
Patient history reviewed patient examined. Will renew medicines by request, and I think the chances are pretty good in 6 months ,that we will be back at the [address] [place] [address] location barring any coronavirus setbacks.

## 2020-07-17 NOTE — PROGRESS NOTES
Neurology Consult      Subjective: Geoffrey Christensen is a 76 y.o. female who comes in today with family. No new medical or surgical history but once again the background story is she really does not do anything to help her self and as a result she is a total care type of person. If left to her own resources as usual she will just simply watch TV with occasional references in conversation to what the family member is doing or where she going etc. is on Namenda XR 28 mg daily and Exelon patch 9.5 mg. Eating has gotten to the point of having everything laid out in front of her and I am told she does not even stir her coffee but is very passive short of picking up the eating utensils and using them for that express timeframe and purpose. Is at the [facility] office on [address] and we have not had any  Business at that office in recent months. I believe vascular change next month by degrees again respecting the coronavirus precautions and limitations etc.  On today's visit heard her speak for the first time and more than several encounters. Basically expressed her limited wish not to sit on the exam table and proceeded to maneuver to 1 of the chairs in that office. Cannot think of anything else to advance her case and her family knows her quite well and anticipate her needs without fail. Revisit 6 months. Current Outpatient Medications   Medication Sig Dispense Refill    rivastigmine (Exelon) 9.5 mg/24 hr patch APPLY ONE PATCH TO THE SKIN DAILY. 90 Patch 3    Namenda XR 28 mg capsule Take 1 Cap by mouth daily.  Indications: moderate to severe Alzheimer's type dementia 90 Cap 3    benztropine (COGENTIN) 1 mg tablet TAKE 1 TABLET BY MOUTH TWICE A  Tab 2    perphenazine (TRILAFON) 2 mg tablet TAKE 1 TABLET BY MOUTH TWICE A  Tab 1    simvastatin (ZOCOR) 40 mg tablet TAKE 1 TABLET BY MOUTH EVERYDAY AT BEDTIME 90 Tab 1    lisinopril (PRINIVIL, ZESTRIL) 5 mg tablet TAKE 1 TABLET BY MOUTH EVERY DAY 90 Tab 1    nystatin (MYCOSTATIN) powder Apply  to affected area four (4) times daily. 1 Bottle 5    OTHER PT and OT for walking and balance  DX: Falls 30 Each 0    triamcinolone acetonide (KENALOG) 0.1 % topical cream Apply  to affected area daily.  To lip 15 g 0      No Known Allergies  Past Medical History:   Diagnosis Date    Anxiety     Constipation     Cough     Degenerative joint disease of knee     both knees    Edema     Hypercholesterolemia     Hypertension     Memory loss     MR (mental retardation)     Snoring       Past Surgical History:   Procedure Laterality Date    COLONOSCOPY  2008    neg    HX BREAST BIOPSY Right 2012    neg; stereotactic bx    HX CATARACT REMOVAL        Social History     Socioeconomic History    Marital status: SINGLE     Spouse name: Not on file    Number of children: Not on file    Years of education: Not on file    Highest education level: Not on file   Occupational History    Not on file   Social Needs    Financial resource strain: Not on file    Food insecurity     Worry: Not on file     Inability: Not on file    Transportation needs     Medical: Not on file     Non-medical: Not on file   Tobacco Use    Smoking status: Never Smoker    Smokeless tobacco: Never Used   Substance and Sexual Activity    Alcohol use: No    Drug use: No    Sexual activity: Not Currently   Lifestyle    Physical activity     Days per week: Not on file     Minutes per session: Not on file    Stress: Not on file   Relationships    Social connections     Talks on phone: Not on file     Gets together: Not on file     Attends Zoroastrianism service: Not on file     Active member of club or organization: Not on file     Attends meetings of clubs or organizations: Not on file     Relationship status: Not on file    Intimate partner violence     Fear of current or ex partner: Not on file     Emotionally abused: Not on file     Physically abused: Not on file     Forced sexual activity: Not on file   Other Topics Concern    Not on file   Social History Narrative    Not on file      Family History   Problem Relation Age of Onset    Heart Disease Mother     Hypertension Mother    Bassem.Ryanne Elevated Lipids Mother     Hypertension Sister    Bassem.Ryanne Elevated Lipids Sister     Breast Cancer Sister 77    Elevated Lipids Brother     Hypertension Brother     Elevated Lipids Sister     Hypertension Sister       Visit Vitals  /66   Pulse 70   Temp 97.4 °F (36.3 °C)   Resp 18   Ht 4' 11\" (1.499 m)   Wt 57.8 kg (127 lb 8 oz)   SpO2 98%   BMI 25.75 kg/m²        Review of Systems:   A comprehensive review of systems was negative except for that written in the HPI. Neuro Exam:     Appearance: The patient is well developed, well nourished, and unable to provide a coherent history and is in no acute distress. Mental Status: Oriented to name by inference. Mood and affect appropriate to her baseline which is normally nonverbal and plus or minus eye contact. Cranial Nerves:   Intact visual fields. Fundi are benign. ARNALDO, EOM's full, no nystagmus, no ptosis. Facial sensation is normal. Corneal reflexes are intact. Facial movement is symmetric. Hearing is normal bilaterally. Palate is midline with normal sternocleidomastoid and trapezius muscles are normal. Tongue is midline. Motor:  5/5 strength in upper and lower proximal and distal muscles. Normal bulk and tone. No fasciculations. Reflexes:   Deep tendon reflexes 2+/4 and symmetrical.   Sensory:   Normal to touch, pinprick and vibration. Gait:  Normal gait. Tremor:   No tremor noted. Cerebellar:  No cerebellar signs present. Neurovascular:  Normal heart sounds and regular rhythm, peripheral pulses intact, and no carotid bruits. Assessment:   Dementia. Medicine renewed by request and no new issues uncovered today.   Revisit in 6 months and I am thinking barring any type of complications with the pre-existing coronavirus, we should have established some type of regular routine at the [address] [place] [address]. Plan:   Revisit 6 months.   Signed by :  Nikkie Lam MD

## 2020-07-17 NOTE — LETTER
7/17/20 Patient: Jose Kumar YOB: 1945 Date of Visit: 7/17/2020 Matt Crouch MD 
[address]  [address] [place] [address] VIA In Basket Dear Matt Crouch MD, Thank you for referring Ms. Jose Kumar to [facility] for evaluation. My notes for this consultation are attached. If you have questions, please do not hesitate to call me. I look forward to following your patient along with you.  
 
 
Sincerely, 
 
Nikolas Gresham MD

## 2020-07-19 RX ORDER — RIVASTIGMINE 9.5 MG/24H
PATCH, EXTENDED RELEASE TRANSDERMAL
Qty: 90 PATCH | Refills: 3 | Status: SHIPPED | OUTPATIENT
Start: 2020-07-19 | End: 2021-01-21 | Stop reason: SDUPTHER

## 2020-07-19 RX ORDER — LISINOPRIL 5 MG/1
TABLET ORAL
Qty: 90 TAB | Refills: 1 | Status: SHIPPED | OUTPATIENT
Start: 2020-07-19 | End: 2020-09-28

## 2020-07-19 RX ORDER — SIMVASTATIN 40 MG/1
40 TABLET, FILM COATED ORAL
Qty: 90 TAB | Refills: 1 | Status: SHIPPED | OUTPATIENT
Start: 2020-07-19 | End: 2021-03-08

## 2020-07-21 RX ORDER — MEMANTINE HYDROCHLORIDE 28 MG/1
CAPSULE, EXTENDED RELEASE ORAL
Qty: 90 CAP | Refills: 3 | Status: SHIPPED | OUTPATIENT
Start: 2020-07-21 | End: 2021-01-21 | Stop reason: SDUPTHER

## 2020-09-11 ENCOUNTER — HOSPITAL ENCOUNTER (OUTPATIENT)
Dept: MAMMOGRAPHY | Age: 75
Discharge: HOME | End: 2020-09-11
Attending: FAMILY MEDICINE
Payer: MEDICARE

## 2020-09-11 DIAGNOSIS — Z12.31 VISIT FOR SCREENING MAMMOGRAM: ICD-10-CM

## 2020-09-11 PROCEDURE — 77067 SCR MAMMO BI INCL CAD: CPT

## 2020-09-11 RX ORDER — PERPHENAZINE 2 MG/1
TABLET, FILM COATED ORAL
Qty: 180 TAB | Refills: 1 | Status: SHIPPED | OUTPATIENT
Start: 2020-09-11 | End: 2021-03-08

## 2020-09-14 NOTE — PROGRESS NOTES
Caregiver Margarita March on hippa id x 3, notified as per Dr. Brown Clubs and verbalized understanding.

## 2020-09-28 ENCOUNTER — OFFICE VISIT (OUTPATIENT)
Dept: FAMILY MEDICINE CLINIC | Age: 75
End: 2020-09-28
Payer: MEDICARE

## 2020-09-28 VITALS
TEMPERATURE: 98.6 F | BODY MASS INDEX: 25.6 KG/M2 | WEIGHT: 127 LBS | DIASTOLIC BLOOD PRESSURE: 90 MMHG | HEART RATE: 73 BPM | HEIGHT: 59 IN | OXYGEN SATURATION: 96 % | RESPIRATION RATE: 20 BRPM | SYSTOLIC BLOOD PRESSURE: 164 MMHG

## 2020-09-28 DIAGNOSIS — F20.5 RESIDUAL SCHIZOPHRENIA (HCC): ICD-10-CM

## 2020-09-28 DIAGNOSIS — Z23 ENCOUNTER FOR IMMUNIZATION: ICD-10-CM

## 2020-09-28 DIAGNOSIS — E78.00 HYPERCHOLESTEREMIA: ICD-10-CM

## 2020-09-28 DIAGNOSIS — Z00.00 ROUTINE GENERAL MEDICAL EXAMINATION AT A HEALTH CARE FACILITY: Primary | ICD-10-CM

## 2020-09-28 DIAGNOSIS — G30.8 ALZHEIMER'S DISEASE OF OTHER ONSET WITHOUT BEHAVIORAL DISTURBANCE: ICD-10-CM

## 2020-09-28 DIAGNOSIS — F02.80 ALZHEIMER'S DISEASE OF OTHER ONSET WITHOUT BEHAVIORAL DISTURBANCE: ICD-10-CM

## 2020-09-28 DIAGNOSIS — I10 ESSENTIAL HYPERTENSION: ICD-10-CM

## 2020-09-28 DIAGNOSIS — F03.91 DEMENTIA WITH BEHAVIORAL DISTURBANCE, UNSPECIFIED DEMENTIA TYPE: ICD-10-CM

## 2020-09-28 PROCEDURE — 1090F PRES/ABSN URINE INCON ASSESS: CPT | Performed by: FAMILY MEDICINE

## 2020-09-28 PROCEDURE — G8399 PT W/DXA RESULTS DOCUMENT: HCPCS | Performed by: FAMILY MEDICINE

## 2020-09-28 PROCEDURE — G0444 DEPRESSION SCREEN ANNUAL: HCPCS | Performed by: FAMILY MEDICINE

## 2020-09-28 PROCEDURE — G0463 HOSPITAL OUTPT CLINIC VISIT: HCPCS | Performed by: FAMILY MEDICINE

## 2020-09-28 PROCEDURE — G8753 SYS BP > OR = 140: HCPCS | Performed by: FAMILY MEDICINE

## 2020-09-28 PROCEDURE — G8510 SCR DEP NEG, NO PLAN REQD: HCPCS | Performed by: FAMILY MEDICINE

## 2020-09-28 PROCEDURE — G8755 DIAS BP > OR = 90: HCPCS | Performed by: FAMILY MEDICINE

## 2020-09-28 PROCEDURE — G0439 PPPS, SUBSEQ VISIT: HCPCS | Performed by: FAMILY MEDICINE

## 2020-09-28 PROCEDURE — 99214 OFFICE O/P EST MOD 30 MIN: CPT | Performed by: FAMILY MEDICINE

## 2020-09-28 PROCEDURE — G8419 CALC BMI OUT NRM PARAM NOF/U: HCPCS | Performed by: FAMILY MEDICINE

## 2020-09-28 PROCEDURE — 90694 VACC AIIV4 NO PRSRV 0.5ML IM: CPT

## 2020-09-28 PROCEDURE — G9899 SCRN MAM PERF RSLTS DOC: HCPCS | Performed by: FAMILY MEDICINE

## 2020-09-28 PROCEDURE — 3017F COLORECTAL CA SCREEN DOC REV: CPT | Performed by: FAMILY MEDICINE

## 2020-09-28 PROCEDURE — G8427 DOCREV CUR MEDS BY ELIG CLIN: HCPCS | Performed by: FAMILY MEDICINE

## 2020-09-28 PROCEDURE — G8536 NO DOC ELDER MAL SCRN: HCPCS | Performed by: FAMILY MEDICINE

## 2020-09-28 PROCEDURE — 1101F PT FALLS ASSESS-DOCD LE1/YR: CPT | Performed by: FAMILY MEDICINE

## 2020-09-28 RX ORDER — LISINOPRIL 20 MG/1
TABLET ORAL
Qty: 90 TAB | Refills: 3 | Status: SHIPPED | OUTPATIENT
Start: 2020-09-28 | End: 2021-09-01

## 2020-09-28 NOTE — PROGRESS NOTES
Pt / caregiver given opportunity to review vaccine information sheet prior to vaccine administration. Opportunity given for questions and concerns. No questions or concerns at this time.

## 2020-09-28 NOTE — PROGRESS NOTES
Patient here for dementia, flu shot and htn. Care giver takes blood pressure at home regularly and gets the same bp. Record of bp's brought in with patient. 1. Have you been to the ER, urgent care clinic since your last visit? Hospitalized since your last visit? No    2. Have you seen or consulted any other health care providers outside of the [address] [place] [address] since your last visit? Include any pap smears or colon screening. No         Medicare Wellness Exam:    Chief Complaint   Patient presents with    Hypotension    Dementia    Immunization/Injection     flu shot     she is a 76y.o. year old female who presents for evaluation for their Medicare Wellness Visit. Frances Pay is completed and assessed=yes  Depression Screen is completed and assessed=yes  Medication list reviewed and adjusted for accuracy=yes  Immunizations reviewed and updated=yes  Health/Preventative Screenings reviewed and updated=yes  ADL Functions reviewed=yes    Patient Active Problem List    Diagnosis    Chronic pain of both knees    Advance care planning     POA      Alzheimer's disease ([facility]    Syncope and collapse    Anxiety    Edema    Snoring    Cough    Constipation    Memory loss    Dementia in conditions classified elsewhere without behavioral disturbance    Schizophrenia ([facility]    Mental retardation    Hypercholesteremia    HTN (hypertension)    Mental disability    DJD (degenerative joint disease)       Reviewed PmHx, RxHx, FmHx, SocHx, AllgHx and updated and dated in the chart.     Review of Systems - negative except as listed above in the HPI    Objective:     Vitals:    09/28/20 1509   BP: (!) 164/90   Pulse: 73   Resp: 20   Temp: 98.6 °F (37 °C)   SpO2: 96%   Weight: 127 lb (57.6 kg)   Height: 4' 11\" (1.499 m)     Physical Examination: General appearance - alert, well appearing, and in no distress  Chest - clear to auscultation, no wheezes, rales or rhonchi, symmetric air entry  Heart - normal rate, regular rhythm, normal S1, S2, no murmurs, rubs, clicks or gallops  Abdomen - soft, nontender, nondistended, no masses or organomegaly  Extremities - peripheral pulses normal, no pedal edema, no clubbing or cyanosis    Assessment/ Plan:   Diagnoses and all orders for this visit:    1. Routine general medical examination at a health care facility  -     METABOLIC PANEL, COMPREHENSIVE; Future  -     LIPID PANEL; Future  -     CBC WITH AUTOMATED DIFF; Future  -     TSH 3RD GENERATION; Future  -     HEMOGLOBIN A1C WITH EAG; Future    2. Residual schizophrenia ([facility]  -Stable  3. Dementia with behavioral disturbance, unspecified dementia type ([facility]  -Stable   4. Alzheimer's disease of other onset without behavioral disturbance ([facility]  -Mildly better compared to last office visit but was with her sister. 5. Essential hypertension  -     METABOLIC PANEL, COMPREHENSIVE; Future  -     LIPID PANEL; Future  -     lisinopriL (PRINIVIL, ZESTRIL) 20 mg tablet; TAKE 1 TABLET BY MOUTH EVERY DAY   -Blood pressure is increased therefore I am going to increase her medications this is been consistent at home blood pressure measurements as well    6. Hypercholesteremia  -     METABOLIC PANEL, COMPREHENSIVE; Future  -     LIPID PANEL;  Future         -Pain evaluation performed in office  -Cognitive Screen performed in office  -Depression Screen, Fall risks (by up and go test)  and ADL functionality were addressed  -Medication list updated and reviewed for any changes   -A comprehensive review of medical issues and a plan was formulated  -End of life planning was addressed with pt   -Health Screenings for preventions were addressed and a plan was formulated  -Shingles Vaccine was recommended  -Discussed with patient cancer risk factors and appropriate screenings for age  -Patient evaluated for colonoscopy and referred if needed per screeing criteria  -Labs from previous visits were discussed with patient   -Discussed with patient diet and exercise and formulated a plan as needed  -An Advanced care plan was developed with the patient.  -Alcohol screening performed and was negative    -  Follow-up and Dispositions    · Return in about 6 months (around 3/28/2021). I have discussed the diagnosis with the patient and the intended plan as seen in the above orders. The patient understands and agrees with the plan. The patient has received an after-visit summary and questions were answered concerning future plans. Medication Side Effects and Warnings were discussed with patien  Patient Labs were reviewed and or requested  Patient Past Records were reviewed and or requested    There are no Patient Instructions on file for this visit.       Lalo Gold M.D.

## 2020-09-29 LAB
ALBUMIN SERPL-MCNC: 3.9 G/DL (ref 3.5–5)
ALBUMIN/GLOB SERPL: 1.1 {RATIO} (ref 1.1–2.2)
ALP SERPL-CCNC: 102 U/L (ref 45–117)
ALT SERPL-CCNC: 18 U/L (ref 12–78)
ANION GAP SERPL CALC-SCNC: 6 MMOL/L (ref 5–15)
AST SERPL-CCNC: 18 U/L (ref 15–37)
BASOPHILS # BLD: 0.1 K/UL (ref 0–0.1)
BASOPHILS NFR BLD: 1 % (ref 0–1)
BILIRUB SERPL-MCNC: 0.2 MG/DL (ref 0.2–1)
BUN SERPL-MCNC: 25 MG/DL (ref 6–20)
BUN/CREAT SERPL: 25 (ref 12–20)
CALCIUM SERPL-MCNC: 9.9 MG/DL (ref 8.5–10.1)
CHLORIDE SERPL-SCNC: 102 MMOL/L (ref 97–108)
CHOLEST SERPL-MCNC: 158 MG/DL
CO2 SERPL-SCNC: 28 MMOL/L (ref 21–32)
CREAT SERPL-MCNC: 1.01 MG/DL (ref 0.55–1.02)
DIFFERENTIAL METHOD BLD: NORMAL
EOSINOPHIL # BLD: 0.2 K/UL (ref 0–0.4)
EOSINOPHIL NFR BLD: 2 % (ref 0–7)
ERYTHROCYTE [DISTWIDTH] IN BLOOD BY AUTOMATED COUNT: 13 % (ref 11.5–14.5)
GLOBULIN SER CALC-MCNC: 3.4 G/DL (ref 2–4)
GLUCOSE SERPL-MCNC: 87 MG/DL (ref 65–100)
HCT VFR BLD AUTO: 37.1 % (ref 35–47)
HDLC SERPL-MCNC: 51 MG/DL
HDLC SERPL: 3.1 {RATIO} (ref 0–5)
HGB BLD-MCNC: 11.7 G/DL (ref 11.5–16)
IMM GRANULOCYTES # BLD AUTO: 0 K/UL (ref 0–0.04)
IMM GRANULOCYTES NFR BLD AUTO: 0 % (ref 0–0.5)
LDLC SERPL CALC-MCNC: 88.6 MG/DL (ref 0–100)
LIPID PROFILE,FLP: NORMAL
LYMPHOCYTES # BLD: 2 K/UL (ref 0.8–3.5)
LYMPHOCYTES NFR BLD: 22 % (ref 12–49)
MCH RBC QN AUTO: 28.7 PG (ref 26–34)
MCHC RBC AUTO-ENTMCNC: 31.5 G/DL (ref 30–36.5)
MCV RBC AUTO: 91.2 FL (ref 80–99)
MONOCYTES # BLD: 0.8 K/UL (ref 0–1)
MONOCYTES NFR BLD: 9 % (ref 5–13)
NEUTS SEG # BLD: 6.3 K/UL (ref 1.8–8)
NEUTS SEG NFR BLD: 66 % (ref 32–75)
NRBC # BLD: 0 K/UL (ref 0–0.01)
NRBC BLD-RTO: 0 PER 100 WBC
PLATELET # BLD AUTO: 314 K/UL (ref 150–400)
PMV BLD AUTO: 10.7 FL (ref 8.9–12.9)
POTASSIUM SERPL-SCNC: 4.2 MMOL/L (ref 3.5–5.1)
PROT SERPL-MCNC: 7.3 G/DL (ref 6.4–8.2)
PSA SERPL-MCNC: 0 NG/ML
RBC # BLD AUTO: 4.07 M/UL (ref 3.8–5.2)
SODIUM SERPL-SCNC: 136 MMOL/L (ref 136–145)
TRIGL SERPL-MCNC: 92 MG/DL (ref ?–150)
TSH SERPL DL<=0.05 MIU/L-ACNC: 1.35 UIU/ML (ref 0.36–3.74)
VLDLC SERPL CALC-MCNC: 18.4 MG/DL
WBC # BLD AUTO: 9.5 K/UL (ref 3.6–11)

## 2020-09-29 NOTE — PROGRESS NOTES
After reviewing your labs, I believe they are within normal  limits for your age. Keep working hard on diet and taking your medications that are prescribed. If you have any acute care needs and are having trouble getting an appointment. .. please send me a   [facility] message or have the  send me a message. Have a blessed day and  be kind  to others! If you have any questions, feel free to email thru [facility], or give us   a call back at [phone]. Stiven Maldonado M.D.   Good Help to Those in Need  \"You maybe whatever you resolve to be\"

## 2020-12-14 RX ORDER — BENZTROPINE MESYLATE 1 MG/1
TABLET ORAL
Qty: 180 TAB | Refills: 2 | Status: SHIPPED | OUTPATIENT
Start: 2020-12-14 | End: 2021-01-14 | Stop reason: SDUPTHER

## 2021-01-18 ENCOUNTER — TELEPHONE (OUTPATIENT)
Dept: NEUROLOGY | Age: 76
End: 2021-01-18

## 2021-01-21 ENCOUNTER — OFFICE VISIT (OUTPATIENT)
Dept: NEUROLOGY | Age: 76
End: 2021-01-21
Payer: MEDICARE

## 2021-01-21 VITALS
RESPIRATION RATE: 14 BRPM | HEART RATE: 66 BPM | WEIGHT: 130.6 LBS | DIASTOLIC BLOOD PRESSURE: 72 MMHG | TEMPERATURE: 97.1 F | SYSTOLIC BLOOD PRESSURE: 128 MMHG | OXYGEN SATURATION: 97 % | BODY MASS INDEX: 26.33 KG/M2 | HEIGHT: 59 IN

## 2021-01-21 DIAGNOSIS — F03.90 DEMENTIA WITHOUT BEHAVIORAL DISTURBANCE, UNSPECIFIED DEMENTIA TYPE: Primary | ICD-10-CM

## 2021-01-21 PROCEDURE — G8754 DIAS BP LESS 90: HCPCS | Performed by: SPECIALIST

## 2021-01-21 PROCEDURE — 99214 OFFICE O/P EST MOD 30 MIN: CPT | Performed by: SPECIALIST

## 2021-01-21 PROCEDURE — G8399 PT W/DXA RESULTS DOCUMENT: HCPCS | Performed by: SPECIALIST

## 2021-01-21 PROCEDURE — G8419 CALC BMI OUT NRM PARAM NOF/U: HCPCS | Performed by: SPECIALIST

## 2021-01-21 PROCEDURE — 1090F PRES/ABSN URINE INCON ASSESS: CPT | Performed by: SPECIALIST

## 2021-01-21 PROCEDURE — G8752 SYS BP LESS 140: HCPCS | Performed by: SPECIALIST

## 2021-01-21 PROCEDURE — 1101F PT FALLS ASSESS-DOCD LE1/YR: CPT | Performed by: SPECIALIST

## 2021-01-21 PROCEDURE — 3017F COLORECTAL CA SCREEN DOC REV: CPT | Performed by: SPECIALIST

## 2021-01-21 PROCEDURE — G8536 NO DOC ELDER MAL SCRN: HCPCS | Performed by: SPECIALIST

## 2021-01-21 PROCEDURE — G8432 DEP SCR NOT DOC, RNG: HCPCS | Performed by: SPECIALIST

## 2021-01-21 PROCEDURE — G8427 DOCREV CUR MEDS BY ELIG CLIN: HCPCS | Performed by: SPECIALIST

## 2021-01-21 RX ORDER — BENZTROPINE MESYLATE 1 MG/1
TABLET ORAL
Qty: 180 TAB | Refills: 2 | Status: SHIPPED | OUTPATIENT
Start: 2021-01-21 | End: 2021-04-07 | Stop reason: SDUPTHER

## 2021-01-21 RX ORDER — RIVASTIGMINE 9.5 MG/24H
PATCH, EXTENDED RELEASE TRANSDERMAL
Qty: 90 PATCH | Refills: 3 | Status: SHIPPED | OUTPATIENT
Start: 2021-01-21 | End: 2021-03-10 | Stop reason: SDUPTHER

## 2021-01-21 RX ORDER — MEMANTINE HYDROCHLORIDE 28 MG/1
CAPSULE, EXTENDED RELEASE ORAL
Qty: 90 CAP | Refills: 3 | Status: SHIPPED | OUTPATIENT
Start: 2021-01-21 | End: 2021-04-07 | Stop reason: SDUPTHER

## 2021-01-21 RX ORDER — POLYETHYLENE GLYCOL 400 AND PROPYLENE GLYCOL 4; 3 MG/ML; MG/ML
SOLUTION/ DROPS OPHTHALMIC AS NEEDED
COMMUNITY

## 2021-01-21 RX ORDER — RIVASTIGMINE 9.5 MG/24H
PATCH, EXTENDED RELEASE TRANSDERMAL
Qty: 90 PATCH | Refills: 3 | Status: SHIPPED | OUTPATIENT
Start: 2021-01-21 | End: 2022-01-24 | Stop reason: SDUPTHER

## 2021-01-21 RX ORDER — MEMANTINE HYDROCHLORIDE 28 MG/1
28 CAPSULE, EXTENDED RELEASE ORAL DAILY
Qty: 90 CAP | Refills: 3 | Status: SHIPPED | OUTPATIENT
Start: 2021-01-21 | End: 2021-04-07

## 2021-01-21 NOTE — PATIENT INSTRUCTIONS
Patient history reviewed patient examined. Would recommend continuation of the Namenda extended release 28 and Exelon patch 9.5. 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 Benadryl lower dose may help on some of these occasions. Medicine will be renewed and revisit in 6 months.

## 2021-01-21 NOTE — LETTER
1/21/2021 Patient: Jean Pierre Adamson YOB: 1945 Date of Visit: 1/21/2021 Pebbles Tamez MD 
[address] [address] [place] [address] [address] [employer] Dear Pebbles Tamez MD, Thank you for referring Ms. Jean Pierre Adamson to [facility] for evaluation. My notes for this consultation are attached. If you have questions, please do not hesitate to call me. I look forward to following your patient along with you.  
 
 
Sincerely, 
 
Miguel Felix MD

## 2021-01-21 NOTE — PROGRESS NOTES
Neurology Consult      Subjective: Shameka Bentley is a 76 y.o. female who comes in today with family. Is on the Namenda extended release 28 mg daily and the Exelon patch 9.5 mg daily. Caregiver says that she picks at her skin relentlessly and once again not totally unexpected in her case of dementia lack of distractions and perhaps boredom. Low-dose Benadryl may help curb some of these but would have to be managed carefully. These medicines will be renewed by request.  Had some basal cell carcinoma removed from her nose and that is occurred in another location as well. Has her baseline personality and eating and sleeping habits which should not change. Caregiver is trying to acquire the COVID-19 vaccine and once it is announced she will make sure that is accomplished. I think they are both at increased risk otherwise and would readily endorsed its acquisition. No other new medical or surgical history. Patient's exam appears to be baseline as she was nonverbal and had ± contact which is her normal.  I appreciate the job the family has to accomplish to keep her safe in themselves as well. Revisit 6 months. Current Outpatient Medications   Medication Sig Dispense Refill    peg 400-propylene glycol (Systane, propylene glycol,) 0.4-0.3 % drop as needed.  rivastigmine (Exelon) 9.5 mg/24 hr patch APPLY ONE PATCH TO THE SKIN DAILY 90 Patch 3    rivastigmine (Exelon) 9.5 mg/24 hr patch APPLY ONE PATCH TO THE SKIN DAILY. 90 Patch 3    memantine ER (NAMENDA XR) 28 mg capsule TAKE 1 CAP BY MOUTH DAILY. FOR MODERATE TO SEVERE ALZHEIMER'S TYPE DEMENTIA 90 Cap 3    Namenda XR 28 mg capsule Take 1 Cap by mouth daily.  Indications: moderate to severe Alzheimer's type dementia 90 Cap 3    benztropine (COGENTIN) 1 mg tablet TAKE 1 TABLET BY MOUTH TWICE A  Tab 2    lisinopriL (PRINIVIL, ZESTRIL) 20 mg tablet TAKE 1 TABLET BY MOUTH EVERY DAY 90 Tab 3    perphenazine (TRILAFON) 2 mg tablet TAKE 1 TABLET BY MOUTH TWICE A  Tab 1    simvastatin (ZOCOR) 40 mg tablet Take 1 Tab by mouth nightly. 90 Tab 1    nystatin (MYCOSTATIN) powder Apply  to affected area four (4) times daily. 1 Bottle 5    triamcinolone acetonide (KENALOG) 0.1 % topical cream Apply  to affected area daily.  To lip 15 g 0    OTHER PT and OT for walking and balance  DX: Falls 30 Each 0      No Known Allergies  Past Medical History:   Diagnosis Date    Anxiety     Constipation     Cough     Degenerative joint disease of knee     both knees    Edema     Hypercholesterolemia     Hypertension     Memory loss     MR (mental retardation)     Snoring       Past Surgical History:   Procedure Laterality Date    COLONOSCOPY  2008    neg    HX BREAST BIOPSY Right 2012    neg; stereotactic bx    HX CATARACT REMOVAL        Social History     Socioeconomic History    Marital status: SINGLE     Spouse name: Not on file    Number of children: Not on file    Years of education: Not on file    Highest education level: Not on file   Occupational History    Not on file   Social Needs    Financial resource strain: Not on file    Food insecurity     Worry: Not on file     Inability: Not on file    Transportation needs     Medical: Not on file     Non-medical: Not on file   Tobacco Use    Smoking status: Never Smoker    Smokeless tobacco: Never Used   Substance and Sexual Activity    Alcohol use: No    Drug use: No    Sexual activity: Not Currently   Lifestyle    Physical activity     Days per week: Not on file     Minutes per session: Not on file    Stress: Not on file   Relationships    Social connections     Talks on phone: Not on file     Gets together: Not on file     Attends Restorationist service: Not on file     Active member of club or organization: Not on file     Attends meetings of clubs or organizations: Not on file     Relationship status: Not on file    Intimate partner violence     Fear of current or ex partner: Not on file     Emotionally abused: Not on file     Physically abused: Not on file     Forced sexual activity: Not on file   Other Topics Concern    Not on file   Social History Narrative    Not on file      Family History   Problem Relation Age of Onset    Heart Disease Mother     Hypertension Mother    Rivera Alvarez Elevated Lipids Mother     Hypertension Sister    Rivera Salm Elevated Lipids Sister     Breast Cancer Sister 77    Elevated Lipids Brother     Hypertension Brother     Elevated Lipids Sister     Hypertension Sister       Visit Vitals  /72   Pulse 66   Temp 97.1 °F (36.2 °C) (Temporal)   Resp 14   Ht 4' 11\" (1.499 m)   Wt 59.2 kg (130 lb 9.6 oz)   SpO2 97%   BMI 26.38 kg/m²        Review of Systems:   A comprehensive review of systems was negative except for that written in the HPI. Neuro Exam:     Appearance: The patient is well developed, well nourished, and unable to provide a coherent history and is in no acute distress. Mental Status: Oriented to name by inference only. Mood and affect appropriate to baseline which is plus or minus eye contact and nonverbal.   Cranial Nerves:   Intact visual fields. Fundi are benign. ARNALDO, EOM's full, no nystagmus, no ptosis. Facial sensation is normal. Corneal reflexes are intact. Facial movement is symmetric. Hearing is normal bilaterally. Palate is midline with normal sternocleidomastoid and trapezius muscles are normal. Tongue is midline. Motor:  5/5 strength in upper and lower proximal and distal muscles. Normal bulk and tone. No fasciculations. Reflexes:   Deep tendon reflexes 2+/4 and symmetrical.   Sensory:   Normal to touch, pinprick and vibration. Gait:  Normal gait. Tremor:   No tremor noted. Cerebellar:  No cerebellar signs present. Neurovascular:  Normal heart sounds and regular rhythm, peripheral pulses intact, and no carotid bruits. Assessment:   Dementia without behavioral disturbance.   Will renew the Namenda XR 28 mg and Exelon patch 9.5 mg as requested. Hopefully a lower dose of Benadryl to help contain some of her picking behavior but I am sure it defaults to her dementia and lack of distractions and boredom as well. Revisit 6 months. Plan:   Revisit 6 months.   Signed by :  Obdulia Roberson MD

## 2021-03-08 DIAGNOSIS — E78.00 HYPERCHOLESTEREMIA: ICD-10-CM

## 2021-03-08 RX ORDER — PERPHENAZINE 2 MG/1
TABLET, FILM COATED ORAL
Qty: 180 TAB | Refills: 1 | Status: SHIPPED | OUTPATIENT
Start: 2021-03-08 | End: 2021-09-29

## 2021-03-08 RX ORDER — SIMVASTATIN 40 MG/1
TABLET, FILM COATED ORAL
Qty: 90 TAB | Refills: 1 | Status: SHIPPED | OUTPATIENT
Start: 2021-03-08 | End: 2021-09-01

## 2021-04-07 ENCOUNTER — PATIENT MESSAGE (OUTPATIENT)
Dept: NEUROLOGY | Age: 76
End: 2021-04-07

## 2021-04-07 RX ORDER — MEMANTINE HYDROCHLORIDE 28 MG/1
CAPSULE, EXTENDED RELEASE ORAL
Qty: 90 CAP | Refills: 3 | Status: SHIPPED | OUTPATIENT
Start: 2021-04-07 | End: 2021-07-22 | Stop reason: SDUPTHER

## 2021-04-07 RX ORDER — BENZTROPINE MESYLATE 1 MG/1
TABLET ORAL
Qty: 180 TAB | Refills: 2 | Status: SHIPPED | OUTPATIENT
Start: 2021-04-07 | End: 2022-05-06 | Stop reason: SDUPTHER

## 2021-04-10 ENCOUNTER — PATIENT MESSAGE (OUTPATIENT)
Dept: FAMILY MEDICINE CLINIC | Age: 76
End: 2021-04-10

## 2021-04-15 NOTE — TELEPHONE ENCOUNTER
PA for Benztropine approved from 01/12/21 - 04/12/22,copy faxed to pharmacy & Copy placed in scan folder to be scanned to chart.

## 2021-04-27 ENCOUNTER — OFFICE VISIT (OUTPATIENT)
Dept: FAMILY MEDICINE CLINIC | Age: 76
End: 2021-04-27
Payer: MEDICARE

## 2021-04-27 VITALS
DIASTOLIC BLOOD PRESSURE: 83 MMHG | WEIGHT: 129 LBS | HEIGHT: 59 IN | HEART RATE: 78 BPM | BODY MASS INDEX: 26 KG/M2 | TEMPERATURE: 97.8 F | OXYGEN SATURATION: 94 % | SYSTOLIC BLOOD PRESSURE: 174 MMHG

## 2021-04-27 DIAGNOSIS — F03.91 DEMENTIA WITH BEHAVIORAL DISTURBANCE, UNSPECIFIED DEMENTIA TYPE: ICD-10-CM

## 2021-04-27 DIAGNOSIS — F20.5 RESIDUAL SCHIZOPHRENIA (HCC): ICD-10-CM

## 2021-04-27 DIAGNOSIS — I10 ESSENTIAL HYPERTENSION: ICD-10-CM

## 2021-04-27 DIAGNOSIS — H11.9 CONJUNCTIVA DISORDER: Primary | ICD-10-CM

## 2021-04-27 PROCEDURE — G8427 DOCREV CUR MEDS BY ELIG CLIN: HCPCS | Performed by: FAMILY MEDICINE

## 2021-04-27 PROCEDURE — 1101F PT FALLS ASSESS-DOCD LE1/YR: CPT | Performed by: FAMILY MEDICINE

## 2021-04-27 PROCEDURE — G8510 SCR DEP NEG, NO PLAN REQD: HCPCS | Performed by: FAMILY MEDICINE

## 2021-04-27 PROCEDURE — G8536 NO DOC ELDER MAL SCRN: HCPCS | Performed by: FAMILY MEDICINE

## 2021-04-27 PROCEDURE — 3017F COLORECTAL CA SCREEN DOC REV: CPT | Performed by: FAMILY MEDICINE

## 2021-04-27 PROCEDURE — G8419 CALC BMI OUT NRM PARAM NOF/U: HCPCS | Performed by: FAMILY MEDICINE

## 2021-04-27 PROCEDURE — G8399 PT W/DXA RESULTS DOCUMENT: HCPCS | Performed by: FAMILY MEDICINE

## 2021-04-27 PROCEDURE — G8754 DIAS BP LESS 90: HCPCS | Performed by: FAMILY MEDICINE

## 2021-04-27 PROCEDURE — 1090F PRES/ABSN URINE INCON ASSESS: CPT | Performed by: FAMILY MEDICINE

## 2021-04-27 PROCEDURE — G0463 HOSPITAL OUTPT CLINIC VISIT: HCPCS | Performed by: FAMILY MEDICINE

## 2021-04-27 PROCEDURE — 99214 OFFICE O/P EST MOD 30 MIN: CPT | Performed by: FAMILY MEDICINE

## 2021-04-27 PROCEDURE — G8753 SYS BP > OR = 140: HCPCS | Performed by: FAMILY MEDICINE

## 2021-04-27 RX ORDER — AZELASTINE HYDROCHLORIDE 0.5 MG/ML
1 SOLUTION/ DROPS OPHTHALMIC 2 TIMES DAILY
Qty: 6 ML | Refills: 2 | Status: SHIPPED | OUTPATIENT
Start: 2021-04-27 | End: 2021-06-07

## 2021-04-27 NOTE — PROGRESS NOTES
Steven Pittman is a 76 y.o. female , id x 2(name and ). Reviewed record, history, and  medications. Chief Complaint   Patient presents with    Red Eye     right eye, using OTC drops, matted over in AM, x5 days        Vitals:    21 1046   BP: (!) 174/83   Pulse: 78   Temp: 97.8 °F (36.6 °C)   TempSrc: Oral   SpO2: 94%   Weight: 129 lb (58.5 kg)   Height: 4' 11\" (1.499 m)       Coordination of Care Questionnaire:   1) Have you been to an emergency room, urgent care, or hospitalized since your last visit?   no       2. Have seen or consulted any other health care provider since your last visit? NO      3 most recent PHQ Screens 2021   PHQ Not Done -   Little interest or pleasure in doing things Not at all   Feeling down, depressed, irritable, or hopeless Not at all   Total Score PHQ 2 0       Patient is accompanied by sister I have received verbal consent from Steven Pittman to discuss any/all medical information while they are present in the room. Chief Complaint   Patient presents with    Red Eye     right eye, using OTC drops, matted over in AM, x5 days      She is a 76 y.o. female who presents for evalution. Reviewed PmHx, RxHx, FmHx, SocHx, AllgHx and updated and dated in the chart.     Patient Active Problem List    Diagnosis    Chronic pain of both knees    Advance care planning     POA      Alzheimer's disease ([facility] [address]    Syncope and collapse    Anxiety    Edema    Snoring    Cough    Constipation    Memory loss    Dementia in conditions classified elsewhere without behavioral disturbance    Schizophrenia ([facility] [address]    Mental retardation    Hypercholesteremia    HTN (hypertension)    Mental disability    DJD (degenerative joint disease)       Review of Systems - negative except as listed above in the HPI    Objective:     Vitals:    21 1046   BP: (!) 174/83   Pulse: 78   Temp: 97.8 °F (36.6 °C)   TempSrc: Oral   SpO2: 94%   Weight: 129 lb (58.5 kg)   Height: 4' 11\" (1.499 m)     Physical Examination: General appearance - alert, well appearing, and in no distress  Eyes - pupils equal and reactive, extraocular eye movements intact    Assessment/ Plan:   Diagnoses and all orders for this visit:    1. Conjunctiva disorder  -     azelastine (OPTIVAR) 0.05 % ophthalmic solution; Administer 1 Drop to both eyes two (2) times a day. Use in affected eye(s)  -allergies    2. Residual schizophrenia (HCC)  -stable    3. Dementia with behavioral disturbance, unspecified dementia type (HCC)  -stable    4. Essential hypertension  -inc today but good at home             I have discussed the diagnosis with the patient and the intended plan as seen in the above orders. The patient understands and agrees with the plan. The patient has received an after-visit summary and questions were answered concerning future plans. Medication Side Effects and Warnings were discussed with patient  Patient Labs were reviewed and or requested:  Patient Past Records were reviewed and or requested    Nettie Flores M.D. There are no Patient Instructions on file for this visit.

## 2021-06-05 DIAGNOSIS — H11.9 CONJUNCTIVA DISORDER: ICD-10-CM

## 2021-06-07 RX ORDER — AZELASTINE HYDROCHLORIDE 0.5 MG/ML
1 SOLUTION/ DROPS OPHTHALMIC 2 TIMES DAILY
Qty: 18 ML | Refills: 2 | Status: SHIPPED | OUTPATIENT
Start: 2021-06-07 | End: 2021-10-26 | Stop reason: SDUPTHER

## 2021-07-22 ENCOUNTER — OFFICE VISIT (OUTPATIENT)
Dept: NEUROLOGY | Age: 76
End: 2021-07-22
Payer: MEDICARE

## 2021-07-22 VITALS
BODY MASS INDEX: 26.61 KG/M2 | SYSTOLIC BLOOD PRESSURE: 124 MMHG | HEIGHT: 59 IN | DIASTOLIC BLOOD PRESSURE: 84 MMHG | RESPIRATION RATE: 16 BRPM | OXYGEN SATURATION: 100 % | HEART RATE: 88 BPM | WEIGHT: 132 LBS

## 2021-07-22 DIAGNOSIS — F03.90 DEMENTIA WITHOUT BEHAVIORAL DISTURBANCE, UNSPECIFIED DEMENTIA TYPE: Primary | ICD-10-CM

## 2021-07-22 PROCEDURE — 3017F COLORECTAL CA SCREEN DOC REV: CPT | Performed by: SPECIALIST

## 2021-07-22 PROCEDURE — 1090F PRES/ABSN URINE INCON ASSESS: CPT | Performed by: SPECIALIST

## 2021-07-22 PROCEDURE — G8399 PT W/DXA RESULTS DOCUMENT: HCPCS | Performed by: SPECIALIST

## 2021-07-22 PROCEDURE — 1101F PT FALLS ASSESS-DOCD LE1/YR: CPT | Performed by: SPECIALIST

## 2021-07-22 PROCEDURE — G8427 DOCREV CUR MEDS BY ELIG CLIN: HCPCS | Performed by: SPECIALIST

## 2021-07-22 PROCEDURE — G8419 CALC BMI OUT NRM PARAM NOF/U: HCPCS | Performed by: SPECIALIST

## 2021-07-22 PROCEDURE — 99214 OFFICE O/P EST MOD 30 MIN: CPT | Performed by: SPECIALIST

## 2021-07-22 PROCEDURE — G8754 DIAS BP LESS 90: HCPCS | Performed by: SPECIALIST

## 2021-07-22 PROCEDURE — G8752 SYS BP LESS 140: HCPCS | Performed by: SPECIALIST

## 2021-07-22 PROCEDURE — G8510 SCR DEP NEG, NO PLAN REQD: HCPCS | Performed by: SPECIALIST

## 2021-07-22 PROCEDURE — G8536 NO DOC ELDER MAL SCRN: HCPCS | Performed by: SPECIALIST

## 2021-07-22 RX ORDER — MEMANTINE HYDROCHLORIDE 28 MG/1
CAPSULE, EXTENDED RELEASE ORAL
Qty: 90 CAPSULE | Refills: 1 | Status: SHIPPED | OUTPATIENT
Start: 2021-07-22 | End: 2022-01-24 | Stop reason: SDUPTHER

## 2021-07-22 RX ORDER — RIVASTIGMINE 9.5 MG/24H
PATCH, EXTENDED RELEASE TRANSDERMAL
Qty: 90 PATCH | Refills: 1 | Status: SHIPPED | OUTPATIENT
Start: 2021-07-22 | End: 2022-01-24

## 2021-07-22 NOTE — PATIENT INSTRUCTIONS
Patient history reviewed patient examined. Will recommend continuation of medicines a day will be renewed today. I looked at the area around her right and I saw nothing amiss there. Would also recommend she formally see ophthalmology and make sure there is not anything they recognize in the way of irritative intrinsic eye disease etc.  Would suggest if possible that there be some kind of distraction toys or something she can manipulate with her right hand and/or left that would keep her hands off her body etc. revisit 6 months.

## 2021-07-22 NOTE — PROGRESS NOTES
Neurology Consult      Subjective: Nehemias Salazar is a 76 y.o. female who comes in today with family. Medicine was renewed as it includes memantine extended release 28 mg daily along with Exelon patch 9.5 mg per 24 hours. Nothing new in that arena in terms of what she does or does not accomplish verbally or physically. She continues with this picking activity at this time it includes around her right eye. I looked at her right eye and the lids and face area and I did not note any pattern of injury nor did I see if source of any irritation such as inflammation or redness or weeping etc.  Would recommend that she formally see ophthalmology and make sure there is not anything intrinsic to the eyes such as the cornea or the sclera or even the lid margins that would be a source of discomfort etc.  Apparently is status quo in terms of her cognitive capabilities. My suggestion might be in regards to distracting her from the eye with some type of user-friendly and safe toy item that she can manipulate but would not harm her self with to keep her hands busy and otherwise occupied. I saw nothing new on the exam today and will suggest a revisit here in 6 months. She is normally an [address] [place] [address] patient but with the Covid virus I am not sure when we will occupy that location again. Current Outpatient Medications   Medication Sig Dispense Refill    memantine ER (NAMENDA XR) 28 mg capsule TAKE 1 CAP BY MOUTH DAILY. FOR MODERATE TO SEVERE ALZHEIMER'S TYPE DEMENTIA 90 Capsule 1    rivastigmine (Exelon Patch) 9.5 mg/24 hour patch APPLY ONE PATCH TO THE SKIN DAILY 90 Patch 1    benztropine (COGENTIN) 1 mg tablet Take 1 tab by mouth twice daily 180 Tab 2    perphenazine (TRILAFON) 2 mg tablet TAKE 1 TABLET BY MOUTH TWICE A  Tab 1    simvastatin (ZOCOR) 40 mg tablet TAKE 1 TABLET BY MOUTH EVERY DAY AT NIGHT 90 Tab 1    rivastigmine (Exelon) 9.5 mg/24 hr patch APPLY ONE PATCH TO THE SKIN DAILY.  90 Patch 3    lisinopriL (PRINIVIL, ZESTRIL) 20 mg tablet TAKE 1 TABLET BY MOUTH EVERY DAY 90 Tab 3    OTHER PT and OT for walking and balance  DX: Falls 30 Each 0    nystatin (MYCOSTATIN) powder Apply  to affected area four (4) times daily. 1 Bottle 5    triamcinolone acetonide (KENALOG) 0.1 % topical cream Apply  to affected area daily. To lip 15 g 0    azelastine (OPTIVAR) 0.05 % ophthalmic solution ADMINISTER 1 DROP TO BOTH EYES TWO (2) TIMES A DAY. USE IN AFFECTED EYE(S) (Patient not taking: Reported on 7/22/2021) 18 mL 2    peg 400-propylene glycol (Systane, propylene glycol,) 0.4-0.3 % drop as needed. (Patient not taking: Reported on 7/22/2021)        No Known Allergies  Past Medical History:   Diagnosis Date    Anxiety     Constipation     Cough     Degenerative joint disease of knee     both knees    Edema     Hypercholesterolemia     Hypertension     Memory loss     MR (mental retardation)     Snoring       Past Surgical History:   Procedure Laterality Date    COLONOSCOPY  2008    neg    HX BREAST BIOPSY Right 2012    neg; stereotactic bx    HX CATARACT REMOVAL        Social History     Socioeconomic History    Marital status: SINGLE     Spouse name: Not on file    Number of children: Not on file    Years of education: Not on file    Highest education level: Not on file   Occupational History    Not on file   Tobacco Use    Smoking status: Never Smoker    Smokeless tobacco: Never Used   Substance and Sexual Activity    Alcohol use: No    Drug use: No    Sexual activity: Not Currently   Other Topics Concern    Not on file   Social History Narrative    Not on file     Social Determinants of Health     Financial Resource Strain:     Difficulty of Paying Living Expenses:    Food Insecurity:     Worried About Running Out of Food in the Last Year:     Ran Out of Food in the Last Year:    Transportation Needs:     Lack of Transportation (Medical):      Lack of Transportation (Non-Medical):    Physical Activity:     Days of Exercise per Week:     Minutes of Exercise per Session:    Stress:     Feeling of Stress :    Social Connections:     Frequency of Communication with Friends and Family:     Frequency of Social Gatherings with Friends and Family:     Attends Temple Services:     Active Member of Clubs or Organizations:     Attends Club or Organization Meetings:     Marital Status:    Intimate Partner Violence:     Fear of Current or Ex-Partner:     Emotionally Abused:     Physically Abused:     Sexually Abused:       Family History   Problem Relation Age of Onset    Heart Disease Mother     Hypertension Mother     Elevated Lipids Mother     Hypertension Sister     Elevated Lipids Sister     Breast Cancer Sister 77    Elevated Lipids Brother     Hypertension Brother     Elevated Lipids Sister     Hypertension Sister       Visit Vitals  /84 (BP 1 Location: Left upper arm, BP Patient Position: Sitting)   Pulse 88   Resp 16   Ht 4' 11\" (1.499 m)   Wt 59.9 kg (132 lb)   SpO2 100%   BMI 26.66 kg/m²        Review of Systems:   A comprehensive review of systems was negative except for that written in the HPI. Neuro Exam:     Appearance: The patient is well developed, well nourished, and unable to provide a coherent history and is in no acute distress. Mental Status: Oriented to name by inference. Mood and affect appropriate to baseline which is nonverbal somewhat withdrawn and minimal eye contact. Cranial Nerves:   Intact visual fields? Fundi are benign. ARNALDO, EOM's full, no nystagmus, no ptosis. Facial sensation is normal. Corneal reflexes are intact. Facial movement is symmetric. Hearing is normal bilaterally. Palate is midline with normal sternocleidomastoid and trapezius muscles are normal. Tongue is midline.   Inspection of right eye and lids and face was normal.  I saw no pattern of injury and nothing that she would otherwise be scratching at least from an obvious skin lesion. Motor:  5/5 strength in upper and lower proximal and distal muscles. Normal bulk and tone. No fasciculations. Reflexes:   Deep tendon reflexes 2+/4 and symmetrical.   Sensory:   Normal to touch, pinprick and vibration. Gait:  Normal gait. Tremor:   No tremor noted. Cerebellar:  No cerebellar signs present. Neurovascular:  Normal heart sounds and regular rhythm, peripheral pulses intact, and no carotid bruits. Assessment:   Dementia without behavioral disturbance. Will recommend continuation of medicines which were renewed today. On the incessant scratching of her right eye I would recommend she is formally seen by ophthalmology and make sure there is not any thing intrinsic to the eyes such as the cornea of the sclera that would be driving this rubbing activity. My other suggestion is there might be some incentive to putting something in her hand where she has an element of distractibility such as a toy or other eitan object that would be dangerous to her person but will keep her preoccupied. Revisit here 6 months. Plan:   Revisit 6 months.   Signed by :  Payton Norwood MD

## 2021-08-17 ENCOUNTER — TRANSCRIBE ORDER (OUTPATIENT)
Dept: SCHEDULING | Age: 76
End: 2021-08-17

## 2021-08-17 DIAGNOSIS — Z12.31 VISIT FOR SCREENING MAMMOGRAM: Primary | ICD-10-CM

## 2021-09-01 DIAGNOSIS — I10 ESSENTIAL HYPERTENSION: ICD-10-CM

## 2021-09-01 DIAGNOSIS — E78.00 HYPERCHOLESTEREMIA: ICD-10-CM

## 2021-09-01 RX ORDER — SIMVASTATIN 40 MG/1
TABLET, FILM COATED ORAL
Qty: 90 TABLET | Refills: 1 | Status: SHIPPED | OUTPATIENT
Start: 2021-09-01 | End: 2022-02-21

## 2021-09-01 RX ORDER — LISINOPRIL 20 MG/1
TABLET ORAL
Qty: 90 TABLET | Refills: 3 | Status: SHIPPED | OUTPATIENT
Start: 2021-09-01 | End: 2022-07-12

## 2021-09-29 RX ORDER — PERPHENAZINE 2 MG/1
TABLET, FILM COATED ORAL
Qty: 180 TABLET | Refills: 1 | Status: SHIPPED | OUTPATIENT
Start: 2021-09-29 | End: 2022-03-02

## 2021-10-07 ENCOUNTER — HOSPITAL ENCOUNTER (OUTPATIENT)
Dept: MAMMOGRAPHY | Age: 76
Discharge: HOME | End: 2021-10-07
Attending: FAMILY MEDICINE
Payer: MEDICARE

## 2021-10-07 DIAGNOSIS — Z12.31 VISIT FOR SCREENING MAMMOGRAM: ICD-10-CM

## 2021-10-07 PROCEDURE — 77067 SCR MAMMO BI INCL CAD: CPT

## 2021-10-26 ENCOUNTER — OFFICE VISIT (OUTPATIENT)
Dept: FAMILY MEDICINE CLINIC | Age: 76
End: 2021-10-26
Payer: MEDICARE

## 2021-10-26 VITALS
DIASTOLIC BLOOD PRESSURE: 89 MMHG | HEART RATE: 69 BPM | TEMPERATURE: 97.8 F | SYSTOLIC BLOOD PRESSURE: 139 MMHG | HEIGHT: 59 IN | BODY MASS INDEX: 26.61 KG/M2 | RESPIRATION RATE: 18 BRPM | OXYGEN SATURATION: 97 % | WEIGHT: 132 LBS

## 2021-10-26 DIAGNOSIS — Z23 NEEDS FLU SHOT: ICD-10-CM

## 2021-10-26 DIAGNOSIS — Z00.01 ENCOUNTER FOR ROUTINE ADULT HEALTH EXAMINATION WITH ABNORMAL FINDINGS: Primary | ICD-10-CM

## 2021-10-26 DIAGNOSIS — F02.80 DEMENTIA DUE TO MEDICAL CONDITION WITHOUT BEHAVIORAL DISTURBANCE (HCC): ICD-10-CM

## 2021-10-26 DIAGNOSIS — Z71.89 ADVANCE CARE PLANNING: ICD-10-CM

## 2021-10-26 DIAGNOSIS — E78.00 HYPERCHOLESTEREMIA: ICD-10-CM

## 2021-10-26 DIAGNOSIS — I10 PRIMARY HYPERTENSION: ICD-10-CM

## 2021-10-26 DIAGNOSIS — Z13.6 SCREENING, HEART DISEASE, ISCHEMIC: ICD-10-CM

## 2021-10-26 DIAGNOSIS — F20.5 RESIDUAL SCHIZOPHRENIA (HCC): ICD-10-CM

## 2021-10-26 DIAGNOSIS — R73.9 ELEVATED BLOOD SUGAR: ICD-10-CM

## 2021-10-26 DIAGNOSIS — H11.9 CONJUNCTIVA DISORDER: ICD-10-CM

## 2021-10-26 LAB
ALBUMIN SERPL-MCNC: 3.7 G/DL (ref 3.5–5)
ALBUMIN/GLOB SERPL: 1.1 {RATIO} (ref 1.1–2.2)
ALP SERPL-CCNC: 90 U/L (ref 45–117)
ALT SERPL-CCNC: 19 U/L (ref 12–78)
ANION GAP SERPL CALC-SCNC: 3 MMOL/L (ref 5–15)
AST SERPL-CCNC: 18 U/L (ref 15–37)
BILIRUB SERPL-MCNC: 0.4 MG/DL (ref 0.2–1)
BUN SERPL-MCNC: 20 MG/DL (ref 6–20)
BUN/CREAT SERPL: 21 (ref 12–20)
CALCIUM SERPL-MCNC: 10 MG/DL (ref 8.5–10.1)
CHLORIDE SERPL-SCNC: 104 MMOL/L (ref 97–108)
CHOLEST SERPL-MCNC: 160 MG/DL
CO2 SERPL-SCNC: 28 MMOL/L (ref 21–32)
CREAT SERPL-MCNC: 0.96 MG/DL (ref 0.55–1.02)
EST. AVERAGE GLUCOSE BLD GHB EST-MCNC: 117 MG/DL
GLOBULIN SER CALC-MCNC: 3.5 G/DL (ref 2–4)
GLUCOSE SERPL-MCNC: 101 MG/DL (ref 65–100)
HBA1C MFR BLD: 5.7 % (ref 4–5.6)
HDLC SERPL-MCNC: 52 MG/DL
HDLC SERPL: 3.1 {RATIO} (ref 0–5)
LDLC SERPL CALC-MCNC: 77.4 MG/DL (ref 0–100)
POTASSIUM SERPL-SCNC: 4.8 MMOL/L (ref 3.5–5.1)
PROT SERPL-MCNC: 7.2 G/DL (ref 6.4–8.2)
SODIUM SERPL-SCNC: 135 MMOL/L (ref 136–145)
TRIGL SERPL-MCNC: 153 MG/DL (ref ?–150)
VLDLC SERPL CALC-MCNC: 30.6 MG/DL

## 2021-10-26 PROCEDURE — 90694 VACC AIIV4 NO PRSRV 0.5ML IM: CPT | Performed by: FAMILY MEDICINE

## 2021-10-26 PROCEDURE — 1101F PT FALLS ASSESS-DOCD LE1/YR: CPT | Performed by: FAMILY MEDICINE

## 2021-10-26 PROCEDURE — 1090F PRES/ABSN URINE INCON ASSESS: CPT | Performed by: FAMILY MEDICINE

## 2021-10-26 PROCEDURE — G8754 DIAS BP LESS 90: HCPCS | Performed by: FAMILY MEDICINE

## 2021-10-26 PROCEDURE — G8752 SYS BP LESS 140: HCPCS | Performed by: FAMILY MEDICINE

## 2021-10-26 PROCEDURE — 3017F COLORECTAL CA SCREEN DOC REV: CPT | Performed by: FAMILY MEDICINE

## 2021-10-26 PROCEDURE — G0439 PPPS, SUBSEQ VISIT: HCPCS | Performed by: FAMILY MEDICINE

## 2021-10-26 PROCEDURE — G8536 NO DOC ELDER MAL SCRN: HCPCS | Performed by: FAMILY MEDICINE

## 2021-10-26 PROCEDURE — 99214 OFFICE O/P EST MOD 30 MIN: CPT | Performed by: FAMILY MEDICINE

## 2021-10-26 PROCEDURE — G8510 SCR DEP NEG, NO PLAN REQD: HCPCS | Performed by: FAMILY MEDICINE

## 2021-10-26 PROCEDURE — G8427 DOCREV CUR MEDS BY ELIG CLIN: HCPCS | Performed by: FAMILY MEDICINE

## 2021-10-26 PROCEDURE — G8419 CALC BMI OUT NRM PARAM NOF/U: HCPCS | Performed by: FAMILY MEDICINE

## 2021-10-26 PROCEDURE — G0463 HOSPITAL OUTPT CLINIC VISIT: HCPCS | Performed by: FAMILY MEDICINE

## 2021-10-26 PROCEDURE — G8399 PT W/DXA RESULTS DOCUMENT: HCPCS | Performed by: FAMILY MEDICINE

## 2021-10-26 RX ORDER — AZELASTINE HYDROCHLORIDE 0.5 MG/ML
1 SOLUTION/ DROPS OPHTHALMIC 2 TIMES DAILY
Qty: 18 ML | Refills: 2 | Status: SHIPPED | OUTPATIENT
Start: 2021-10-26 | End: 2022-07-12

## 2021-10-26 NOTE — PATIENT INSTRUCTIONS
Vaccine Information Statement    Influenza (Flu) Vaccine (Inactivated or Recombinant): What You Need to Know    Many vaccine information statements are available in Nepali and other languages. See www.immunize.org/vis. Hojas de información sobre vacunas están disponibles en español y en muchos otros idiomas. Visite www.immunize.org/vis. 1. Why get vaccinated? Influenza vaccine can prevent influenza (flu). Flu is a contagious disease that spreads around the [facility] every year, usually between October and May. Anyone can get the flu, but it is more dangerous for some people. Infants and young children, people 72 years and older, pregnant people, and people with certain health conditions or a weakened immune system are at greatest risk of flu complications. Pneumonia, bronchitis, sinus infections, and ear infections are examples of flu-related complications. If you have a medical condition, such as heart disease, cancer, or diabetes, flu can make it worse. Flu can cause fever and chills, sore throat, muscle aches, fatigue, cough, headache, and runny or stuffy nose. Some people may have vomiting and diarrhea, though this is more common in children than adults. In an average year, thousands of people in the [facility] die from flu, and many more are hospitalized. Flu vaccine prevents millions of illnesses and flu-related visits to the doctor each year. 2. Influenza vaccines     CDC recommends everyone 6 months and older get vaccinated every flu season. Children 6 months through 6years of age may need 2 doses during a single flu season. Everyone else needs only 1 dose each flu season. It takes about 2 weeks for protection to develop after vaccination. There are many flu viruses, and they are always changing. Each year a new flu vaccine is made to protect against the influenza viruses believed to be likely to cause disease in the upcoming flu season.  Even when the vaccine doesnt exactly match these viruses, it may still provide some protection. Influenza vaccine does not cause flu. Influenza vaccine may be given at the same time as other vaccines. 3. Talk with your health care provider    Tell your vaccination provider if the person getting the vaccine:   Has had an allergic reaction after a previous dose of influenza vaccine, or has any severe, life-threatening allergies    Has ever had Guillain-Barré Syndrome (also called GBS)    In some cases, your health care provider may decide to postpone influenza vaccination until a future visit. Influenza vaccine can be administered at any time during pregnancy. People who are or will be pregnant during influenza season should receive inactivated influenza vaccine. People with minor illnesses, such as a cold, may be vaccinated. People who are moderately or severely ill should usually wait until they recover before getting influenza vaccine. Your health care provider can give you more information. 4. Risks of a vaccine reaction     Soreness, redness, and swelling where the shot is given, fever, muscle aches, and headache can happen after influenza vaccination.  There may be a very small increased risk of Guillain-Barré Syndrome (GBS) after inactivated influenza vaccine (the flu shot). Melissa Pinch children who get the flu shot along with pneumococcal vaccine (PCV13) and/or DTaP vaccine at the same time might be slightly more likely to have a seizure caused by fever. Tell your health care provider if a child who is getting flu vaccine has ever had a seizure. People sometimes faint after medical procedures, including vaccination. Tell your provider if you feel dizzy or have vision changes or ringing in the ears. As with any medicine, there is a very remote chance of a vaccine causing a severe allergic reaction, other serious injury, or death. 5. What if there is a serious problem?     An allergic reaction could occur after the vaccinated person leaves the clinic. If you see signs of a severe allergic reaction (hives, swelling of the face and throat, difficulty breathing, a fast heartbeat, dizziness, or weakness), call 9-1-1 and get the person to the nearest hospital.    For other signs that concern you, call your health care provider. Adverse reactions should be reported to the Vaccine Adverse Event Reporting System (VAERS). Your health care provider will usually file this report, or you can do it yourself. Visit the VAERS website at www.vaers. [facility].gov or call 4-586.296.6206. VAERS is only for reporting reactions, and VAERS staff members do not give medical advice. 6. The National Vaccine Injury Compensation Program    The [facility] Vaccine Injury Compensation Program (VICP) is a federal program that was created to compensate people who may have been injured by certain vaccines. Claims regarding alleged injury or death due to vaccination have a time limit for filing, which may be as short as two years. Visit the VICP website at www.[facility] [[facility]]a.gov/vaccinecompensation or call 7-443.297.8620 to learn about the program and about filing a claim. 7. How can I learn more?  Ask your health care provider.  Call your local or state health department.  Visit the website of the Food and Drug Administration (FDA) for vaccine package inserts and additional information at www.fda.gov/vaccines-blood-biologics/vaccines.  Contact the Centers for Disease Control and Prevention (CDC):  - Call 3-510.356.1370 (1-800-CDC-INFO) or  - Visit CDCs influenza website at www.cdc.gov/flu. Vaccine Information Statement   Inactivated Influenza Vaccine   8/6/2021  42 [address] [employer] Fees [id number]   Department of Health and Human Services  Centers for Disease Control and Prevention    Office Use Only

## 2021-10-26 NOTE — PROGRESS NOTES
Patient here for htn, non- fasting labs. 1. Have you been to the ER, urgent care clinic since your last visit? Hospitalized since your last visit? No    2. Have you seen or consulted any other health care providers outside of the [address] [place] [address] since your last visit? Include any pap smears or colon screening. No     Medicare Wellness Exam:    Chief Complaint   Patient presents with    Hypertension     not fasting    Immunization/Injection     flu     she is a 76y.o. year old female who presents for evaluation for their Medicare Wellness Visit. Chyrel Decker is completed and assessed=yes  Depression Screen is completed and assessed=yes  Medication list reviewed and adjusted for accuracy=yes  Immunizations reviewed and updated=yes  Health/Preventative Screenings reviewed and updated=yes  ADL Functions reviewed=yes    Patient Active Problem List    Diagnosis    Chronic pain of both knees    Advance care planning     POA      [facility]    Syncope and collapse    Anxiety    Edema    Snoring    Cough    Constipation    Memory loss    Dementia due to medical condition without behavioral disturbance ([facility]    Schizophrenia ([facility]    Mental retardation    Hypercholesteremia    HTN (hypertension)    Mental disability    DJD (degenerative joint disease)       Reviewed PmHx, RxHx, FmHx, SocHx, AllgHx and updated and dated in the chart. Review of Systems - negative except as listed above in the HPI    Objective:     Vitals:    10/26/21 1000   BP: 139/89   Pulse: 69   Resp: 18   Temp: 97.8 °F (36.6 °C)   SpO2: 97%   Weight: 132 lb (59.9 kg)   Height: 4' 11\" (1.499 m)       Assessment/ Plan:   Diagnoses and all orders for this visit:    1. Encounter for routine adult health examination with abnormal findings  -     LIPID PANEL; Future  -     METABOLIC PANEL, COMPREHENSIVE; Future  -     HEMOGLOBIN A1C WITH EAG; Future  See below  2.  Primary hypertension  -     LIPID PANEL; Future  -     METABOLIC PANEL, COMPREHENSIVE; Future  At goal  3. Conjunctiva disorder  -     azelastine (OPTIVAR) 0.05 % ophthalmic solution; Administer 1 Drop to both eyes two (2) times a day. Use in affected eye(s)  Add new Rx  4. Needs flu shot  -     FLU (FLUAD QUAD INFLUENZA VACCINE,QUAD,ADJUVANTED)    5. Screening, heart disease, ischemic  -     LIPID PANEL; Future    6. Hypercholesteremia  -     LIPID PANEL; Future  -     METABOLIC PANEL, COMPREHENSIVE; Future    7. Dementia due to medical condition without behavioral disturbance (HCC)  At baseline  8. Residual schizophrenia (HCC)  Stable  9. Advance care planning  Does not have a living will  10. Elevated blood sugar  -     METABOLIC PANEL, COMPREHENSIVE; Future  -     HEMOGLOBIN A1C WITH EAG; Future         -Pain evaluation performed in office  -Cognitive Screen performed in office  -Depression Screen, Fall risks (by up and go test)  and ADL functionality were addressed  -Medication list updated and reviewed for any changes   -A comprehensive review of medical issues and a plan was formulated  -End of life planning was addressed with pt   -Health Screenings for preventions were addressed and a plan was formulated  -Shingles Vaccine was recommended  -Discussed with patient cancer risk factors and appropriate screenings for age  -Patient evaluated for colonoscopy and referred if needed per screeing criteria  -Labs from previous visits were discussed with patient   -Discussed with patient diet and exercise and formulated a plan as needed  -An Advanced care plan was developed with the patient.  -Alcohol screening performed and was negative    -    I have discussed the diagnosis with the patient and the intended plan as seen in the above orders. The patient understands and agrees with the plan. The patient has received an after-visit summary and questions were answered concerning future plans.      Medication Side Effects and Warnings were discussed with jovana  Patient Labs were reviewed and or requested  Patient Past Records were reviewed and or requested    Patient Instructions     Vaccine Information Statement    Influenza (Flu) Vaccine (Inactivated or Recombinant): What You Need to Know    Many vaccine information statements are available in [place] and other languages. See www.immunize.org/vis. Hojas de información sobre vacunas están disponibles en español y en muchos otros idiomas. Visite www.immunize.org/vis. 1. Why get vaccinated? Influenza vaccine can prevent influenza (flu). Flu is a contagious disease that spreads around the [facility] every year, usually between October and May. Anyone can get the flu, but it is more dangerous for some people. Infants and young children, people 72 years and older, pregnant people, and people with certain health conditions or a weakened immune system are at greatest risk of flu complications. Pneumonia, bronchitis, sinus infections, and ear infections are examples of flu-related complications. If you have a medical condition, such as heart disease, cancer, or diabetes, flu can make it worse. Flu can cause fever and chills, sore throat, muscle aches, fatigue, cough, headache, and runny or stuffy nose. Some people may have vomiting and diarrhea, though this is more common in children than adults. In an average year, thousands of people in the [facility] die from flu, and many more are hospitalized. Flu vaccine prevents millions of illnesses and flu-related visits to the doctor each year. 2. Influenza vaccines     CDC recommends everyone 6 months and older get vaccinated every flu season. Children 6 months through 6years of age may need 2 doses during a single flu season. Everyone else needs only 1 dose each flu season. It takes about 2 weeks for protection to develop after vaccination. There are many flu viruses, and they are always changing.  Each year a new flu vaccine is made to protect against the influenza viruses believed to be likely to cause disease in the upcoming flu season. Even when the vaccine doesnt exactly match these viruses, it may still provide some protection. Influenza vaccine does not cause flu. Influenza vaccine may be given at the same time as other vaccines. 3. Talk with your health care provider    Tell your vaccination provider if the person getting the vaccine:   Has had an allergic reaction after a previous dose of influenza vaccine, or has any severe, life-threatening allergies    Has ever had Guillain-Barré Syndrome (also called GBS)    In some cases, your health care provider may decide to postpone influenza vaccination until a future visit. Influenza vaccine can be administered at any time during pregnancy. People who are or will be pregnant during influenza season should receive inactivated influenza vaccine. People with minor illnesses, such as a cold, may be vaccinated. People who are moderately or severely ill should usually wait until they recover before getting influenza vaccine. Your health care provider can give you more information. 4. Risks of a vaccine reaction     Soreness, redness, and swelling where the shot is given, fever, muscle aches, and headache can happen after influenza vaccination.  There may be a very small increased risk of Guillain-Barré Syndrome (GBS) after inactivated influenza vaccine (the flu shot). [facility] children who get the flu shot along with pneumococcal vaccine (PCV13) and/or DTaP vaccine at the same time might be slightly more likely to have a seizure caused by fever. Tell your health care provider if a child who is getting flu vaccine has ever had a seizure. People sometimes faint after medical procedures, including vaccination. Tell your provider if you feel dizzy or have vision changes or ringing in the ears.     As with any medicine, there is a very remote chance of a vaccine causing a severe allergic reaction, other serious injury, or death. 5. What if there is a serious problem? An allergic reaction could occur after the vaccinated person leaves the clinic. If you see signs of a severe allergic reaction (hives, swelling of the face and throat, difficulty breathing, a fast heartbeat, dizziness, or weakness), call 9-1-1 and get the person to the nearest hospital.    For other signs that concern you, call your health care provider. Adverse reactions should be reported to the Vaccine Adverse Event Reporting System (VAERS). Your health care provider will usually file this report, or you can do it yourself. Visit the VAERS website at www.vaers. [facility].gov or call 7-964.388.9952. VAERS is only for reporting reactions, and VAERS staff members do not give medical advice. 6. The National Vaccine Injury Compensation Program    The [facility] Vaccine Injury Compensation Program (VICP) is a federal program that was created to compensate people who may have been injured by certain vaccines. Claims regarding alleged injury or death due to vaccination have a time limit for filing, which may be as short as two years. Visit the VICP website at www.[facility].gov/vaccinecompensation or call 5-986.164.2446 to learn about the program and about filing a claim. 7. How can I learn more?  Ask your health care provider.  Call your local or state health department.  Visit the website of the Food and Drug Administration (FDA) for vaccine package inserts and additional information at www.fda.gov/vaccines-blood-biologics/vaccines.  Contact the Centers for Disease Control and Prevention (CDC):  - Call 5-524.651.4400 (1-800-CDC-INFO) or  - Visit CDCs influenza website at www.cdc.gov/flu. Vaccine Information Statement   Inactivated Influenza Vaccine   8/6/2021  42 [address] [id number]-63   Department of Health and Human Services  Centers for Disease Control and Prevention    Office Use Only            Faizan Mancera M.D.

## 2021-11-03 NOTE — PROGRESS NOTES
A letter was sent to the patient at the address on file, with lab results and Dr. Raj Kaplan recommendations for the patient.

## 2021-11-03 NOTE — PROGRESS NOTES
Please contact patient regarding their mychart resulted labs. They have not reviewed them to date.       Dr. BILLINGS [facility]

## 2021-11-22 ENCOUNTER — DOCUMENTATION ONLY (OUTPATIENT)
Dept: FAMILY MEDICINE CLINIC | Age: 76
End: 2021-11-22

## 2021-11-22 NOTE — PROGRESS NOTES
Home Care Delivered CMN for durable medical was signed & faxed to [phone],ok,Copy placed in scan folder to be scanned to chart.

## 2021-11-22 NOTE — PROGRESS NOTES
Home Care Delivered CMN for durable medical was put on [facility] ANDRADE desk to process

## 2021-11-30 ENCOUNTER — DOCUMENTATION ONLY (OUTPATIENT)
Dept: FAMILY MEDICINE CLINIC | Age: 76
End: 2021-11-30

## 2021-11-30 NOTE — PROGRESS NOTES
Home Care Delivered CMN for durable medical was put on [facility] ANDRADE desk to process

## 2022-01-24 ENCOUNTER — OFFICE VISIT (OUTPATIENT)
Dept: NEUROLOGY | Age: 77
End: 2022-01-24
Payer: MEDICARE

## 2022-01-24 VITALS
WEIGHT: 132 LBS | SYSTOLIC BLOOD PRESSURE: 147 MMHG | HEART RATE: 81 BPM | DIASTOLIC BLOOD PRESSURE: 85 MMHG | OXYGEN SATURATION: 97 % | HEIGHT: 59 IN | BODY MASS INDEX: 26.61 KG/M2 | RESPIRATION RATE: 14 BRPM | TEMPERATURE: 97.9 F

## 2022-01-24 DIAGNOSIS — F03.90 DEMENTIA WITHOUT BEHAVIORAL DISTURBANCE, UNSPECIFIED DEMENTIA TYPE: Primary | ICD-10-CM

## 2022-01-24 PROCEDURE — 99214 OFFICE O/P EST MOD 30 MIN: CPT | Performed by: SPECIALIST

## 2022-01-24 PROCEDURE — G8754 DIAS BP LESS 90: HCPCS | Performed by: SPECIALIST

## 2022-01-24 PROCEDURE — G8419 CALC BMI OUT NRM PARAM NOF/U: HCPCS | Performed by: SPECIALIST

## 2022-01-24 PROCEDURE — G8536 NO DOC ELDER MAL SCRN: HCPCS | Performed by: SPECIALIST

## 2022-01-24 PROCEDURE — G8427 DOCREV CUR MEDS BY ELIG CLIN: HCPCS | Performed by: SPECIALIST

## 2022-01-24 PROCEDURE — G8753 SYS BP > OR = 140: HCPCS | Performed by: SPECIALIST

## 2022-01-24 PROCEDURE — G8432 DEP SCR NOT DOC, RNG: HCPCS | Performed by: SPECIALIST

## 2022-01-24 PROCEDURE — G8399 PT W/DXA RESULTS DOCUMENT: HCPCS | Performed by: SPECIALIST

## 2022-01-24 PROCEDURE — 1090F PRES/ABSN URINE INCON ASSESS: CPT | Performed by: SPECIALIST

## 2022-01-24 PROCEDURE — 1101F PT FALLS ASSESS-DOCD LE1/YR: CPT | Performed by: SPECIALIST

## 2022-01-24 RX ORDER — RIVASTIGMINE 9.5 MG/24H
PATCH, EXTENDED RELEASE TRANSDERMAL
Qty: 90 PATCH | Refills: 1 | Status: SHIPPED | OUTPATIENT
Start: 2022-01-24 | End: 2022-09-17

## 2022-01-24 RX ORDER — MEMANTINE HYDROCHLORIDE 28 MG/1
CAPSULE, EXTENDED RELEASE ORAL
Qty: 90 CAPSULE | Refills: 1 | Status: SHIPPED | OUTPATIENT
Start: 2022-01-24 | End: 2022-09-17

## 2022-01-24 NOTE — PROGRESS NOTES
Neurology Consult      Subjective: Nomi Huntley is a 68 y.o. female who comes in today with family. Once again the conversation starts as it has before with the patient's predilection to repeatedly rubbing her eyes with her finger and is irritated the eyelid to the point of perhaps introducing a stye? Recently saw her eye doctor who did not make any discovery. She certainly welcome to talk to the pharmacist about any type of eye solution over-the-counter that could relieve inflammation and discomfort. However, once again as long as she continues to make improper gesture she will be a set up for the consequences. 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 Do not appear to be threatening or provoking any awkward or dangerous reactions. Notified the family that we are back at [address] [place] [address] which is much more user-friendly in terms of location and other amenities. I thought her exam was baseline and was withdrawn very poor if any eye contact and acknowledging the family with minimal verbalization or gestures etc.  Will suggest seeing her back in 6 months. To be honest given her presentations I see very little if any change of course at this time. Current Outpatient Medications   Medication Sig Dispense Refill    memantine ER (NAMENDA XR) 28 mg capsule TAKE 1 CAP BY MOUTH DAILY. FOR MODERATE TO SEVERE ALZHEIMER'S TYPE DEMENTIA 90 Capsule 1    rivastigmine (Exelon Patch) 9.5 mg/24 hour patch APPLY ONE PATCH TO THE SKIN DAILY. 90 Patch 1    azelastine (OPTIVAR) 0.05 % ophthalmic solution Administer 1 Drop to both eyes two (2) times a day.  Use in affected eye(s) 18 mL 2    perphenazine (TRILAFON) 2 mg tablet TAKE 1 TABLET BY MOUTH TWICE A  Tablet 1    lisinopriL (PRINIVIL, ZESTRIL) 20 mg tablet TAKE 1 TABLET BY MOUTH EVERY DAY 90 Tablet 3    simvastatin (ZOCOR) 40 mg tablet TAKE 1 TABLET BY MOUTH EVERY DAY AT NIGHT 90 Tablet 1    benztropine (COGENTIN) 1 mg tablet Take 1 tab by mouth twice daily 180 Tab 2    peg 400-propylene glycol (Systane, propylene glycol,) 0.4-0.3 % drop as needed.  nystatin (MYCOSTATIN) powder Apply  to affected area four (4) times daily. 1 Bottle 5    triamcinolone acetonide (KENALOG) 0.1 % topical cream Apply  to affected area daily. To lip 15 g 0      No Known Allergies  Past Medical History:   Diagnosis Date    Anxiety     Constipation     Cough     Degenerative joint disease of knee     both knees    Edema     Hypercholesterolemia     Hypertension     Memory loss     MR (mental retardation)     Snoring       Past Surgical History:   Procedure Laterality Date    COLONOSCOPY  2008    neg    HX BREAST BIOPSY Right 2012    neg; stereotactic bx    HX CATARACT REMOVAL        Social History     Socioeconomic History    Marital status: SINGLE     Spouse name: Not on file    Number of children: Not on file    Years of education: Not on file    Highest education level: Not on file   Occupational History    Not on file   Tobacco Use    Smoking status: Never Smoker    Smokeless tobacco: Never Used   Substance and Sexual Activity    Alcohol use: No    Drug use: No    Sexual activity: Not Currently   Other Topics Concern    Not on file   Social History Narrative    Not on file     Social Determinants of Health     Financial Resource Strain:     Difficulty of Paying Living Expenses: Not on file   Food Insecurity:     Worried About Running Out of Food in the Last Year: Not on file    Laverne of Food in the Last Year: Not on file   Transportation Needs:     Lack of Transportation (Medical): Not on file    Lack of Transportation (Non-Medical):  Not on file   Physical Activity:     Days of Exercise per Week: Not on file    Minutes of Exercise per Session: Not on file   Stress:     Feeling of Stress : Not on file   Social Connections:     Frequency of Communication with Friends and Family: Not on file    Frequency of Social Gatherings with Friends and Family: Not on file    Attends Shinto Services: Not on file    Active Member of Clubs or Organizations: Not on file    Attends Club or Organization Meetings: Not on file    Marital Status: Not on file   Intimate Partner Violence:     Fear of Current or Ex-Partner: Not on file    Emotionally Abused: Not on file    Physically Abused: Not on file    Sexually Abused: Not on file   Housing Stability:     Unable to Pay for Housing in the Last Year: Not on file    Number of [place] in the Last Year: Not on file    Unstable Housing in the Last Year: Not on file      Family History   Problem Relation Age of Onset    Heart Disease Mother     Hypertension Mother     Elevated Lipids Mother     Hypertension Sister     Elevated Lipids Sister     Breast Cancer Sister 77    Elevated Lipids Brother     Hypertension Brother     Elevated Lipids Sister     Hypertension Sister       Visit Vitals  BP (!) 147/85 (BP 1 Location: Left upper arm, BP Patient Position: Sitting)   Pulse 81   Temp 97.9 °F (36.6 °C) (Temporal)   Resp 14   Ht 4' 11\" (1.499 m)   Wt 59.9 kg (132 lb)   SpO2 97%   BMI 26.66 kg/m²        Review of Systems:   A comprehensive review of systems was negative except for that written in the HPI. Neuro Exam:     Appearance: The patient is well developed, well nourished, and unable to provide a coherent history and is in no acute distress. Mental Status: Oriented to name by inference. Mood and affect appropriate to baseline which means sitting back very poor eye contact essentially nonverbal and no spontaneous conversation uttered. .   Cranial Nerves:   Intact visual fields? Fundi are benign. ARNALDO, EOM's full, no nystagmus, no ptosis. Facial sensation is normal. Corneal reflexes are intact. Facial movement is symmetric. Hearing is normal bilaterally.  Palate is midline with normal sternocleidomastoid and trapezius muscles are normal. Tongue is midline. Motor:  5/5 strength in upper and lower proximal and distal muscles. Normal bulk and tone. No fasciculations. Reflexes:   Deep tendon reflexes 2+/4 and symmetrical.   Sensory:   Normal to touch, pinprick and vibration. Gait:  Normal gait. Tremor:   No tremor noted. Cerebellar:  No cerebellar signs present. Neurovascular:  Normal heart sounds and regular rhythm, peripheral pulses intact, and no carotid bruits. Assessment:   Dementia without behavioral disturbance. Will renew the medicines by request Exelon patch 9.5 mg and the Namenda extended release 28 mg. Stays reasonably safely busy as possible. Once again another admission to the patient not to put her fingers in her eyes to reduce the risk of inoculating bacteria and other infectious agents in the process. Plan:   Revisit 6 months.   Signed by :  Hue Thompson MD

## 2022-01-24 NOTE — LETTER
1/24/2022    Patient: Shannon Saavedra   YOB: 1945   Date of Visit: 1/24/2022     Misty Woodruff, [address] [facility] [address]  [address] [facility] [address]    Dear Misty Woodruff MD,      Thank you for referring Ms. Shannon Saavedra to [facility] for evaluation. My notes for this consultation are attached. If you have questions, please do not hesitate to call me. I look forward to following your patient along with you.       Sincerely,    Radha Berman MD

## 2022-01-24 NOTE — PATIENT INSTRUCTIONS
Patient history reviewed patient examined. Will recommend continuation of the memory support drugs the Namenda extended release and the Exelon patch. Stays reasonably safely busy as possible. Hopefully again the instructions not to irritate the eyes with her finger will be followed.   Medicine renewed by request.

## 2022-02-20 DIAGNOSIS — E78.00 HYPERCHOLESTEREMIA: ICD-10-CM

## 2022-02-21 RX ORDER — SIMVASTATIN 40 MG/1
TABLET, FILM COATED ORAL
Qty: 90 TABLET | Refills: 1 | Status: SHIPPED | OUTPATIENT
Start: 2022-02-21 | End: 2022-05-31

## 2022-03-02 RX ORDER — PERPHENAZINE 2 MG/1
TABLET, FILM COATED ORAL
Qty: 180 TABLET | Refills: 1 | Status: SHIPPED | OUTPATIENT
Start: 2022-03-02 | End: 2022-07-12

## 2022-03-19 PROBLEM — G89.29 CHRONIC PAIN OF BOTH KNEES: Status: ACTIVE | Noted: 2018-09-17

## 2022-03-19 PROBLEM — M25.561 CHRONIC PAIN OF BOTH KNEES: Status: ACTIVE | Noted: 2018-09-17

## 2022-03-19 PROBLEM — M25.562 CHRONIC PAIN OF BOTH KNEES: Status: ACTIVE | Noted: 2018-09-17

## 2022-04-26 ENCOUNTER — OFFICE VISIT (OUTPATIENT)
Dept: FAMILY MEDICINE CLINIC | Age: 77
End: 2022-04-26
Payer: MEDICARE

## 2022-04-26 VITALS
SYSTOLIC BLOOD PRESSURE: 135 MMHG | HEART RATE: 64 BPM | OXYGEN SATURATION: 97 % | BODY MASS INDEX: 25 KG/M2 | WEIGHT: 124 LBS | DIASTOLIC BLOOD PRESSURE: 76 MMHG | TEMPERATURE: 98 F | RESPIRATION RATE: 16 BRPM | HEIGHT: 59 IN

## 2022-04-26 DIAGNOSIS — F20.5 RESIDUAL SCHIZOPHRENIA (HCC): ICD-10-CM

## 2022-04-26 DIAGNOSIS — R73.9 ELEVATED BLOOD SUGAR: ICD-10-CM

## 2022-04-26 DIAGNOSIS — F02.80 ALZHEIMER'S DISEASE (HCC): Primary | ICD-10-CM

## 2022-04-26 DIAGNOSIS — F03.91 DEMENTIA WITH BEHAVIORAL DISTURBANCE, UNSPECIFIED DEMENTIA TYPE: ICD-10-CM

## 2022-04-26 DIAGNOSIS — G30.9 ALZHEIMER'S DISEASE (HCC): Primary | ICD-10-CM

## 2022-04-26 DIAGNOSIS — E78.00 HYPERCHOLESTEREMIA: ICD-10-CM

## 2022-04-26 PROCEDURE — G8427 DOCREV CUR MEDS BY ELIG CLIN: HCPCS | Performed by: FAMILY MEDICINE

## 2022-04-26 PROCEDURE — 1090F PRES/ABSN URINE INCON ASSESS: CPT | Performed by: FAMILY MEDICINE

## 2022-04-26 PROCEDURE — G0463 HOSPITAL OUTPT CLINIC VISIT: HCPCS | Performed by: FAMILY MEDICINE

## 2022-04-26 PROCEDURE — 1101F PT FALLS ASSESS-DOCD LE1/YR: CPT | Performed by: FAMILY MEDICINE

## 2022-04-26 PROCEDURE — G8754 DIAS BP LESS 90: HCPCS | Performed by: FAMILY MEDICINE

## 2022-04-26 PROCEDURE — G8536 NO DOC ELDER MAL SCRN: HCPCS | Performed by: FAMILY MEDICINE

## 2022-04-26 PROCEDURE — 99214 OFFICE O/P EST MOD 30 MIN: CPT | Performed by: FAMILY MEDICINE

## 2022-04-26 PROCEDURE — G8510 SCR DEP NEG, NO PLAN REQD: HCPCS | Performed by: FAMILY MEDICINE

## 2022-04-26 PROCEDURE — G8752 SYS BP LESS 140: HCPCS | Performed by: FAMILY MEDICINE

## 2022-04-26 PROCEDURE — G8399 PT W/DXA RESULTS DOCUMENT: HCPCS | Performed by: FAMILY MEDICINE

## 2022-04-26 PROCEDURE — G8419 CALC BMI OUT NRM PARAM NOF/U: HCPCS | Performed by: FAMILY MEDICINE

## 2022-04-26 NOTE — PROGRESS NOTES
Chief Complaint   Patient presents with    Hypertension    Dementia    Labs     NON fasting      1. Have you been to the ER, urgent care clinic since your last visit? Hospitalized since your last visit? No    2. Have you seen or consulted any other health care providers outside of the [address] [place] [address] since your last visit? Include any pap smears or colon screening. No             Chief Complaint   Patient presents with    Hypertension    Dementia    Labs     NON fasting      She is a 68 y.o. female who presents for evalution. Reviewed PmHx, RxHx, FmHx, SocHx, AllgHx and updated and dated in the chart. Patient Active Problem List    Diagnosis    Dementia with behavioral disturbance, unspecified dementia type [facility]    Chronic pain of both knees    Advance care planning     POA      Alzheimer's disease ([facility]    Syncope and collapse    Anxiety    Edema    Snoring    Cough    Constipation    Memory loss    Dementia due to medical condition without behavioral disturbance ([facility]    Schizophrenia ([facility]    Mental retardation    Hypercholesteremia    HTN (hypertension)    Mental disability    DJD (degenerative joint disease)       Review of Systems - negative except as listed above in the HPI    Objective:     Vitals:    04/26/22 0927   BP: 135/76   Pulse: 64   Resp: 16   Temp: 98 °F (36.7 °C)   TempSrc: Oral   SpO2: 97%   Weight: 124 lb (56.2 kg)   Height: 4' 11\" (1.499 m)         Assessment/ Plan:   Diagnoses and all orders for this visit:    1. Alzheimer's disease ([facility]  -stable on rx    2. Dementia with behavioral disturbance, unspecified dementia type ([facility]  -advanced    3. Residual schizophrenia ([facility]  -at baseline    4. Hypercholesteremia  -     LIPID PANEL; Future  -     METABOLIC PANEL, COMPREHENSIVE; Future    5. Elevated blood sugar  -     METABOLIC PANEL, COMPREHENSIVE; Future  -     HEMOGLOBIN A1C WITH EAG;  Future         Follow-up and Dispositions    · Return in about 1 year (around 4/26/2023). I have discussed the diagnosis with the patient and the intended plan as seen in the above orders. The patient understands and agrees with the plan. The patient has received an after-visit summary and questions were answered concerning future plans. Medication Side Effects and Warnings were discussed with patient  Patient Labs were reviewed and or requested:  Patient Past Records were reviewed and or requested    Mona Adams M.D. There are no Patient Instructions on file for this visit.

## 2022-04-27 LAB
ALBUMIN SERPL-MCNC: 3.9 G/DL (ref 3.5–5)
ALBUMIN/GLOB SERPL: 1.3 {RATIO} (ref 1.1–2.2)
ALP SERPL-CCNC: 106 U/L (ref 45–117)
ALT SERPL-CCNC: 20 U/L (ref 12–78)
ANION GAP SERPL CALC-SCNC: 7 MMOL/L (ref 5–15)
AST SERPL-CCNC: 21 U/L (ref 15–37)
BILIRUB SERPL-MCNC: 0.3 MG/DL (ref 0.2–1)
BUN SERPL-MCNC: 16 MG/DL (ref 6–20)
BUN/CREAT SERPL: 17 (ref 12–20)
CALCIUM SERPL-MCNC: 9.7 MG/DL (ref 8.5–10.1)
CHLORIDE SERPL-SCNC: 102 MMOL/L (ref 97–108)
CHOLEST SERPL-MCNC: 149 MG/DL
CO2 SERPL-SCNC: 27 MMOL/L (ref 21–32)
CREAT SERPL-MCNC: 0.96 MG/DL (ref 0.55–1.02)
EST. AVERAGE GLUCOSE BLD GHB EST-MCNC: 111 MG/DL
GLOBULIN SER CALC-MCNC: 3 G/DL (ref 2–4)
GLUCOSE SERPL-MCNC: 110 MG/DL (ref 65–100)
HBA1C MFR BLD: 5.5 % (ref 4–5.6)
HDLC SERPL-MCNC: 52 MG/DL
HDLC SERPL: 2.9 {RATIO} (ref 0–5)
LDLC SERPL CALC-MCNC: 70 MG/DL (ref 0–100)
POTASSIUM SERPL-SCNC: 4.5 MMOL/L (ref 3.5–5.1)
PROT SERPL-MCNC: 6.9 G/DL (ref 6.4–8.2)
SODIUM SERPL-SCNC: 136 MMOL/L (ref 136–145)
TRIGL SERPL-MCNC: 135 MG/DL (ref ?–150)
VLDLC SERPL CALC-MCNC: 27 MG/DL

## 2022-05-02 NOTE — PROGRESS NOTES
Please contact patient regarding their mychart resulted labs. They have not reviewed them to date.       Dr. Ivana Lanes

## 2022-05-03 NOTE — PROGRESS NOTES
A letter was sent to the patient at the address on file, with lab results and Dr. Chaparrita Diana recommendations for the patient.

## 2022-05-06 RX ORDER — BENZTROPINE MESYLATE 1 MG/1
TABLET ORAL
Qty: 180 TABLET | Refills: 2 | Status: SHIPPED | OUTPATIENT
Start: 2022-05-06

## 2022-05-18 ENCOUNTER — PATIENT MESSAGE (OUTPATIENT)
Dept: FAMILY MEDICINE CLINIC | Age: 77
End: 2022-05-18

## 2022-05-28 DIAGNOSIS — E78.00 HYPERCHOLESTEREMIA: ICD-10-CM

## 2022-05-31 RX ORDER — SIMVASTATIN 40 MG/1
TABLET, FILM COATED ORAL
Qty: 90 TABLET | Refills: 1 | Status: SHIPPED | OUTPATIENT
Start: 2022-05-31

## 2022-06-06 ENCOUNTER — OFFICE VISIT (OUTPATIENT)
Dept: FAMILY MEDICINE CLINIC | Age: 77
End: 2022-06-06
Payer: MEDICARE

## 2022-06-06 VITALS
DIASTOLIC BLOOD PRESSURE: 82 MMHG | OXYGEN SATURATION: 95 % | TEMPERATURE: 98.3 F | SYSTOLIC BLOOD PRESSURE: 163 MMHG | WEIGHT: 125 LBS | HEART RATE: 86 BPM | RESPIRATION RATE: 16 BRPM | BODY MASS INDEX: 25.2 KG/M2 | HEIGHT: 59 IN

## 2022-06-06 DIAGNOSIS — N18.31 STAGE 3A CHRONIC KIDNEY DISEASE (HCC): ICD-10-CM

## 2022-06-06 DIAGNOSIS — I10 PRIMARY HYPERTENSION: ICD-10-CM

## 2022-06-06 DIAGNOSIS — F03.91 DEMENTIA WITH BEHAVIORAL DISTURBANCE, UNSPECIFIED DEMENTIA TYPE: Primary | ICD-10-CM

## 2022-06-06 DIAGNOSIS — R29.898 WEAKNESS OF BOTH LOWER EXTREMITIES: ICD-10-CM

## 2022-06-06 PROBLEM — N18.30 CHRONIC RENAL DISEASE, STAGE III (HCC): Status: ACTIVE | Noted: 2022-06-06

## 2022-06-06 PROCEDURE — G8753 SYS BP > OR = 140: HCPCS | Performed by: FAMILY MEDICINE

## 2022-06-06 PROCEDURE — G8536 NO DOC ELDER MAL SCRN: HCPCS | Performed by: FAMILY MEDICINE

## 2022-06-06 PROCEDURE — G8417 CALC BMI ABV UP PARAM F/U: HCPCS | Performed by: FAMILY MEDICINE

## 2022-06-06 PROCEDURE — 1123F ACP DISCUSS/DSCN MKR DOCD: CPT | Performed by: FAMILY MEDICINE

## 2022-06-06 PROCEDURE — G8754 DIAS BP LESS 90: HCPCS | Performed by: FAMILY MEDICINE

## 2022-06-06 PROCEDURE — G8427 DOCREV CUR MEDS BY ELIG CLIN: HCPCS | Performed by: FAMILY MEDICINE

## 2022-06-06 PROCEDURE — G8510 SCR DEP NEG, NO PLAN REQD: HCPCS | Performed by: FAMILY MEDICINE

## 2022-06-06 PROCEDURE — G8399 PT W/DXA RESULTS DOCUMENT: HCPCS | Performed by: FAMILY MEDICINE

## 2022-06-06 PROCEDURE — 99213 OFFICE O/P EST LOW 20 MIN: CPT | Performed by: FAMILY MEDICINE

## 2022-06-06 PROCEDURE — G0463 HOSPITAL OUTPT CLINIC VISIT: HCPCS | Performed by: FAMILY MEDICINE

## 2022-06-06 PROCEDURE — 1090F PRES/ABSN URINE INCON ASSESS: CPT | Performed by: FAMILY MEDICINE

## 2022-06-06 PROCEDURE — 1101F PT FALLS ASSESS-DOCD LE1/YR: CPT | Performed by: FAMILY MEDICINE

## 2022-06-06 NOTE — PROGRESS NOTES
Chief Complaint   Patient presents with    Extremity Weakness     Face to face for lightweight manual Wheel Chair   [address]:  [phone] Care Coordinator:Yenifer Ortiz    Patient requires a light weight manual wheelchair with arm rests and foot pedals. Patient has a diagnosis of Dementia: Alzheimer's disease resulting in mobility limitations. Patient is unable to use a cane or walker due to weakness of lower extremities and balance difficulties. The patient's home provides adequate room for a light weight manual wheelchair to maneuver in spaces and between rooms. Patient and/or care giver is available and willing to safely use manual wheelchair for its purpose of getting the patient around the home safely, and more independently to provide ADL's.    1. Have you been to the ER, urgent care clinic since your last visit? Hospitalized since your last visit? No    2. Have you seen or consulted any other health care providers outside of the [address] [place] [address] since your last visit? Include any pap smears or colon screening. No           Chief Complaint   Patient presents with    Extremity Weakness     Face to face for lightweight manual Wheel Chair    Dementia     She is a 68 y.o. female who presents for evalution. Reviewed PmHx, RxHx, FmHx, SocHx, AllgHx and updated and dated in the chart.     Patient Active Problem List    Diagnosis    Chronic renal disease, stage III    Dementia with behavioral disturbance, unspecified dementia type ([address]    Chronic pain of both knees    Advance care planning     POA      Alzheimer's disease ([address]    Syncope and collapse    Anxiety    Edema    Snoring    Cough    Constipation    Memory loss    Dementia due to medical condition without behavioral disturbance ([address]    Schizophrenia ([address]    Mental retardation    Hypercholesteremia    HTN (hypertension)    Mental disability    DJD (degenerative joint disease) Review of Systems - negative except as listed above in the HPI    Objective:     Vitals:    06/06/22 1457   BP: (!) 163/82   Pulse: 86   Resp: 16   Temp: 98.3 °F (36.8 °C)   TempSrc: Temporal   SpO2: 95%   Weight: 125 lb (56.7 kg)   Height: 4' 11\" (1.499 m)         Assessment/ Plan:   Diagnoses and all orders for this visit:    1. Dementia with behavioral disturbance, unspecified dementia type ([facility]  -stable    2. Stage 3a chronic kidney disease (HCC)  -stable labs    3. Primary hypertension  -inc today with no sxs  -nl at home    4. Weakness of both lower extremities  -order WC  -see above note and contact info           I have discussed the diagnosis with the patient and the intended plan as seen in the above orders. The patient understands and agrees with the plan. The patient has received an after-visit summary and questions were answered concerning future plans. Medication Side Effects and Warnings were discussed with patient  Patient Labs were reviewed and or requested:  Patient Past Records were reviewed and or requested    Waldron Bumpers, M.D. There are no Patient Instructions on file for this visit.

## 2022-06-08 NOTE — TELEPHONE ENCOUNTER
Order for transport chair faxed to [facility] at [phone], Attn: Dee Gasca

## 2022-07-12 DIAGNOSIS — H11.9 CONJUNCTIVA DISORDER: ICD-10-CM

## 2022-07-12 DIAGNOSIS — I10 ESSENTIAL HYPERTENSION: ICD-10-CM

## 2022-07-12 RX ORDER — LISINOPRIL 20 MG/1
TABLET ORAL
Qty: 90 TABLET | Refills: 3 | Status: SHIPPED | OUTPATIENT
Start: 2022-07-12

## 2022-07-12 RX ORDER — AZELASTINE HYDROCHLORIDE 0.5 MG/ML
1 SOLUTION/ DROPS OPHTHALMIC 2 TIMES DAILY
Qty: 18 ML | Refills: 2 | Status: SHIPPED | OUTPATIENT
Start: 2022-07-12

## 2022-07-12 RX ORDER — PERPHENAZINE 2 MG/1
TABLET, FILM COATED ORAL
Qty: 180 TABLET | Refills: 1 | Status: SHIPPED | OUTPATIENT
Start: 2022-07-12

## 2022-07-28 ENCOUNTER — OFFICE VISIT (OUTPATIENT)
Dept: NEUROLOGY | Age: 77
End: 2022-07-28
Payer: MEDICARE

## 2022-07-28 VITALS — OXYGEN SATURATION: 97 % | SYSTOLIC BLOOD PRESSURE: 136 MMHG | HEART RATE: 79 BPM | DIASTOLIC BLOOD PRESSURE: 88 MMHG

## 2022-07-28 DIAGNOSIS — F03.90 DEMENTIA WITHOUT BEHAVIORAL DISTURBANCE, UNSPECIFIED DEMENTIA TYPE: Primary | ICD-10-CM

## 2022-07-28 PROCEDURE — G8536 NO DOC ELDER MAL SCRN: HCPCS | Performed by: SPECIALIST

## 2022-07-28 PROCEDURE — 1101F PT FALLS ASSESS-DOCD LE1/YR: CPT | Performed by: SPECIALIST

## 2022-07-28 PROCEDURE — G8752 SYS BP LESS 140: HCPCS | Performed by: SPECIALIST

## 2022-07-28 PROCEDURE — 1123F ACP DISCUSS/DSCN MKR DOCD: CPT | Performed by: SPECIALIST

## 2022-07-28 PROCEDURE — G8427 DOCREV CUR MEDS BY ELIG CLIN: HCPCS | Performed by: SPECIALIST

## 2022-07-28 PROCEDURE — G8754 DIAS BP LESS 90: HCPCS | Performed by: SPECIALIST

## 2022-07-28 PROCEDURE — G8417 CALC BMI ABV UP PARAM F/U: HCPCS | Performed by: SPECIALIST

## 2022-07-28 PROCEDURE — 1090F PRES/ABSN URINE INCON ASSESS: CPT | Performed by: SPECIALIST

## 2022-07-28 PROCEDURE — 99214 OFFICE O/P EST MOD 30 MIN: CPT | Performed by: SPECIALIST

## 2022-07-28 PROCEDURE — G8432 DEP SCR NOT DOC, RNG: HCPCS | Performed by: SPECIALIST

## 2022-07-28 PROCEDURE — G8399 PT W/DXA RESULTS DOCUMENT: HCPCS | Performed by: SPECIALIST

## 2022-07-28 NOTE — PATIENT INSTRUCTIONS
Patient history viewed patient examined. Will recommend continuation of memory support drugs as they are and appreciate the documentation of the history as it is as it makes my job so much easier and more complete. Revisit 6 months and stay reasonably safe and busy. If the weather is bad just reschedule.

## 2022-07-28 NOTE — PROGRESS NOTES
Neurology Consult      Subjective: Rogelio Jha is a 68 y.o. female who comes in today with family. Is by way of recall on Namenda extended release 28 mg daily and the Exelon patch 9.5 mg daily as well. No downside to taking either product and actually no new medical or surgical history. Ended up getting an interesting story line which I do not think I had heard anything like this before as follows. She has good friends she has known for a long time and apparently when they come over ,she becomes quite animated physically verbally and otherwise. It is like a night and day transition and quite a contrast to what I see on the every 6-month follow-ups. Recently had her haircut and it looks good and I think he was on a dare if I understand the story line. Eating is good but sometimes somewhat sloppy and she can approach the food with her hands instead of eating utensils and then forgetting or misappropriation her hands to her face and hair that is where that goes. Gets up several times at night for the bathroom which is her usual agenda. Otherwise no outburst sor agitations or directed hostility's. The only other history I can impart is she is quite emotionally expressive if something bothers her and she can go into a physical pantomime and/or verbal to announce her less than happy response. Will suggest we continue with the memory support drugs as listed and revisit in 6 months. We will put is relatively early into the winter so if the weather is bad please reschedule. Current Outpatient Medications   Medication Sig Dispense Refill    perphenazine (TRILAFON) 2 mg tablet TAKE 1 TABLET BY MOUTH TWICE A  Tablet 1    azelastine (OPTIVAR) 0.05 % ophthalmic solution ADMINISTER 1 DROP TO BOTH EYES TWO (2) TIMES A DAY.  USE IN AFFECTED EYE(S) 18 mL 2    lisinopriL (PRINIVIL, ZESTRIL) 20 mg tablet TAKE 1 TABLET BY MOUTH EVERY DAY 90 Tablet 3    simvastatin (ZOCOR) 40 mg tablet TAKE 1 TABLET BY MOUTH EVERY DAY AT NIGHT 90 Tablet 1    benztropine (COGENTIN) 1 mg tablet Take 1 tab by mouth twice daily 180 Tablet 2    memantine ER (NAMENDA XR) 28 mg capsule TAKE 1 CAP BY MOUTH DAILY. FOR MODERATE TO SEVERE ALZHEIMER'S TYPE DEMENTIA 90 Capsule 1    rivastigmine (Exelon Patch) 9.5 mg/24 hour patch APPLY ONE PATCH TO THE SKIN DAILY. 90 Patch 1    peg 400-propylene glycol (Systane, propylene glycol,) 0.4-0.3 % drop as needed. nystatin (MYCOSTATIN) powder Apply  to affected area four (4) times daily. 1 Bottle 5    triamcinolone acetonide (KENALOG) 0.1 % topical cream Apply  to affected area daily.  To lip 15 g 0      No Known Allergies  Past Medical History:   Diagnosis Date    Anxiety     Constipation     Cough     Degenerative joint disease of knee     both knees    Edema     Hypercholesterolemia     Hypertension     Memory loss     MR (mental retardation)     Snoring       Past Surgical History:   Procedure Laterality Date    COLONOSCOPY  2008    neg    HX BREAST BIOPSY Right 2012    neg; stereotactic bx    HX CATARACT REMOVAL        Social History     Socioeconomic History    Marital status: SINGLE     Spouse name: Not on file    Number of children: Not on file    Years of education: Not on file    Highest education level: Not on file   Occupational History    Not on file   Tobacco Use    Smoking status: Never    Smokeless tobacco: Never   Substance and Sexual Activity    Alcohol use: No    Drug use: No    Sexual activity: Not Currently   Other Topics Concern    Not on file   Social History Narrative    Not on file     Social Determinants of Health     Financial Resource Strain: Not on file   Food Insecurity: Not on file   Transportation Needs: Not on file   Physical Activity: Not on file   Stress: Not on file   Social Connections: Not on file   Intimate Partner Violence: Not on file   Housing Stability: Not on file      Family History   Problem Relation Age of Onset    Heart Disease Mother Hypertension Mother     Elevated Lipids Mother     Hypertension Sister     Elevated Lipids Sister     Breast Cancer Sister 77    Elevated Lipids Brother     Hypertension Brother     Elevated Lipids Sister     Hypertension Sister       Visit Vitals  /88 (BP 1 Location: Left upper arm, BP Patient Position: Sitting, BP Cuff Size: Adult)   Pulse 79   SpO2 97%        Review of Systems:   A comprehensive review of systems was negative except for that written in the HPI. Neuro Exam:     Appearance: The patient is well developed, well nourished, and unable to provide a coherent history and is in no acute distress. Mental Status: Oriented to name by inference and otherwise nonverbal.  Mood and affect appropriate to baseline which is nonverbal for me and poor eye contact. Cranial Nerves:   Intact visual fields? Fundi are benign. ARNALDO, EOM's full, no nystagmus, no ptosis. Facial sensation is normal. Corneal reflexes are intact. Facial movement is symmetric. Hearing is normal bilaterally. Palate is midline with normal sternocleidomastoid and trapezius muscles are normal. Tongue is midline. Motor:  5/5 strength in upper and lower proximal and distal muscles. Normal bulk and tone. No fasciculations. Reflexes:   Deep tendon reflexes 2+/4 and symmetrical.   Sensory:   Normal to touch, pinprick and vibration. Gait:  Normal gait. Tremor:   No tremor noted. Cerebellar:  No cerebellar signs present. Neurovascular:  Normal heart sounds and regular rhythm, peripheral pulses intact, and no carotid bruits. Assessment:   Dementia without behavioral disturbance. Will recommend continuation of the Exelon patch and the Namenda extended release 28 mg daily. Fortunately has excellent supervisory care 7/24 and no doubt why she is done so well so far. Suggest revisit in 6 months and reschedule if weather is inclement. Plan:   Revisit 6 months.   Signed by :  Tabatha Mojica MD

## 2022-07-28 NOTE — LETTER
7/28/2022    Patient: Duran Frye   YOB: 1945   Date of Visit: 7/28/2022     Gwen Gomez, [address] [facility] [address]  [place] [facility] [address]    Dear Gwen Gomez MD,      Thank you for referring Ms. Duran Frye to [address] [place] [address] for evaluation. My notes for this consultation are attached. If you have questions, please do not hesitate to call me. I look forward to following your patient along with you.       Sincerely,    Jayden Macias MD

## 2022-09-12 ENCOUNTER — TRANSCRIBE ORDER (OUTPATIENT)
Dept: SCHEDULING | Age: 77
End: 2022-09-12

## 2022-09-12 DIAGNOSIS — Z12.31 ENCOUNTER FOR SCREENING MAMMOGRAM FOR BREAST CANCER: Primary | ICD-10-CM

## 2022-09-17 RX ORDER — RIVASTIGMINE 9.5 MG/24H
PATCH, EXTENDED RELEASE TRANSDERMAL
Qty: 90 PATCH | Refills: 1 | Status: SHIPPED | OUTPATIENT
Start: 2022-09-17

## 2022-09-17 RX ORDER — MEMANTINE HYDROCHLORIDE 28 MG/1
CAPSULE, EXTENDED RELEASE ORAL
Qty: 90 CAPSULE | Refills: 1 | Status: SHIPPED | OUTPATIENT
Start: 2022-09-17

## 2022-10-04 ENCOUNTER — DOCUMENTATION ONLY (OUTPATIENT)
Dept: FAMILY MEDICINE CLINIC | Age: 77
End: 2022-10-04

## 2022-10-05 ENCOUNTER — DOCUMENTATION ONLY (OUTPATIENT)
Dept: FAMILY MEDICINE CLINIC | Age: 77
End: 2022-10-05

## 2022-10-05 NOTE — PROGRESS NOTES
[facility] CMN for durable medical was signed & faxed to [phone],ok,Copy placed in scan folder to be scanned to chart.

## 2022-10-27 ENCOUNTER — HOSPITAL ENCOUNTER (OUTPATIENT)
Dept: MAMMOGRAPHY | Age: 77
Discharge: HOME | End: 2022-10-27
Attending: FAMILY MEDICINE
Payer: MEDICARE

## 2022-10-27 DIAGNOSIS — Z12.31 ENCOUNTER FOR SCREENING MAMMOGRAM FOR BREAST CANCER: ICD-10-CM

## 2022-10-27 PROCEDURE — 77067 SCR MAMMO BI INCL CAD: CPT

## 2022-11-28 ENCOUNTER — OFFICE VISIT (OUTPATIENT)
Dept: FAMILY MEDICINE CLINIC | Age: 77
End: 2022-11-28
Payer: MEDICARE

## 2022-11-28 ENCOUNTER — TELEPHONE (OUTPATIENT)
Dept: FAMILY MEDICINE CLINIC | Age: 77
End: 2022-11-28

## 2022-11-28 VITALS
RESPIRATION RATE: 18 BRPM | WEIGHT: 125 LBS | BODY MASS INDEX: 25.2 KG/M2 | HEIGHT: 59 IN | SYSTOLIC BLOOD PRESSURE: 129 MMHG | DIASTOLIC BLOOD PRESSURE: 81 MMHG | HEART RATE: 84 BPM | OXYGEN SATURATION: 97 % | TEMPERATURE: 97.7 F

## 2022-11-28 DIAGNOSIS — E78.00 HYPERCHOLESTEREMIA: ICD-10-CM

## 2022-11-28 DIAGNOSIS — Z00.01 ENCOUNTER FOR ROUTINE ADULT HEALTH EXAMINATION WITH ABNORMAL FINDINGS: Primary | ICD-10-CM

## 2022-11-28 DIAGNOSIS — F02.80 ALZHEIMER'S DISEASE (HCC): ICD-10-CM

## 2022-11-28 DIAGNOSIS — F02.80 DEMENTIA DUE TO MEDICAL CONDITION WITHOUT BEHAVIORAL DISTURBANCE (HCC): ICD-10-CM

## 2022-11-28 DIAGNOSIS — F79 MENTAL DISABILITY: ICD-10-CM

## 2022-11-28 DIAGNOSIS — R73.9 ELEVATED BLOOD SUGAR: ICD-10-CM

## 2022-11-28 DIAGNOSIS — Z23 NEEDS FLU SHOT: ICD-10-CM

## 2022-11-28 DIAGNOSIS — I10 PRIMARY HYPERTENSION: ICD-10-CM
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
## 2022-11-28 DIAGNOSIS — N18.31 STAGE 3A CHRONIC KIDNEY DISEASE (HCC): ICD-10-CM

## 2022-11-28 DIAGNOSIS — F20.5 RESIDUAL SCHIZOPHRENIA (HCC): ICD-10-CM

## 2022-11-28 DIAGNOSIS — G30.9 ALZHEIMER'S DISEASE (HCC): ICD-10-CM

## 2022-11-28 PROCEDURE — G8427 DOCREV CUR MEDS BY ELIG CLIN: HCPCS | Performed by: FAMILY MEDICINE

## 2022-11-28 PROCEDURE — 1101F PT FALLS ASSESS-DOCD LE1/YR: CPT | Performed by: FAMILY MEDICINE

## 2022-11-28 PROCEDURE — 1123F ACP DISCUSS/DSCN MKR DOCD: CPT | Performed by: FAMILY MEDICINE

## 2022-11-28 PROCEDURE — G8754 DIAS BP LESS 90: HCPCS | Performed by: FAMILY MEDICINE

## 2022-11-28 PROCEDURE — G8399 PT W/DXA RESULTS DOCUMENT: HCPCS | Performed by: FAMILY MEDICINE

## 2022-11-28 PROCEDURE — G0439 PPPS, SUBSEQ VISIT: HCPCS | Performed by: FAMILY MEDICINE

## 2022-11-28 PROCEDURE — 3078F DIAST BP <80 MM HG: CPT | Performed by: FAMILY MEDICINE

## 2022-11-28 PROCEDURE — 1090F PRES/ABSN URINE INCON ASSESS: CPT | Performed by: FAMILY MEDICINE

## 2022-11-28 PROCEDURE — G8536 NO DOC ELDER MAL SCRN: HCPCS | Performed by: FAMILY MEDICINE

## 2022-11-28 PROCEDURE — G8510 SCR DEP NEG, NO PLAN REQD: HCPCS | Performed by: FAMILY MEDICINE

## 2022-11-28 PROCEDURE — 90694 VACC AIIV4 NO PRSRV 0.5ML IM: CPT | Performed by: FAMILY MEDICINE

## 2022-11-28 PROCEDURE — 99214 OFFICE O/P EST MOD 30 MIN: CPT | Performed by: FAMILY MEDICINE

## 2022-11-28 PROCEDURE — G8752 SYS BP LESS 140: HCPCS | Performed by: FAMILY MEDICINE

## 2022-11-28 PROCEDURE — G0463 HOSPITAL OUTPT CLINIC VISIT: HCPCS | Performed by: FAMILY MEDICINE

## 2022-11-28 PROCEDURE — G8417 CALC BMI ABV UP PARAM F/U: HCPCS | Performed by: FAMILY MEDICINE

## 2022-11-28 PROCEDURE — 3074F SYST BP LT 130 MM HG: CPT | Performed by: FAMILY MEDICINE

## 2022-11-28 NOTE — PROGRESS NOTES
Chief Complaint   Patient presents with    Labs    Immunization/Injection     Flu shot    Hypertension    Cholesterol Problem    Decreased Appetite     Needs ensure 2 times daily    Other     Face to Face for manual wheelchair    Extremity Weakness     Requests PT      Patient requires a light weight manual wheelchair with arm rests and foot pedals. Patient has a diagnosis of Dementia: Alzheimer's disease resulting in mobility limitations. Patient is unable to use a cane or walker due to weakness of lower extremities and balance difficulties. The patient's home provides adequate room for a light weight manual wheelchair to maneuver in spaces and between rooms. Patient and/or care giver is available and willing to safely use manual wheelchair for its purpose of getting the patient around the home safely, and more independently to provide ADL's.     1. Have you been to the ER, urgent care clinic since your last visit? Hospitalized since your last visit? No     2. Have you seen or consulted any other health care providers outside of the [address] [place] [address] since your last visit? Include any pap smears or colon screening. No      Medicare Wellness Exam:    Chief Complaint   Patient presents with    Labs    Immunization/Injection     Flu shot    Hypertension    Cholesterol Problem    Decreased Appetite     Needs ensure 2 times daily    Other     Face to Face for manual wheelchair    Extremity Weakness     Requests PT      she is a 68y.o. year old female who presents for evaluation for their Medicare Wellness Visit.     Sobia Jose is completed and assessed=yes  Depression Screen is completed and assessed=yes  Medication list reviewed and adjusted for accuracy=yes  Immunizations reviewed and updated=yes  Health/Preventative Screenings reviewed and updated=yes  ADL Functions reviewed=yes    Patient Active Problem List    Diagnosis    Chronic renal disease, stage III    Dementia with behavioral disturbance, unspecified dementia type    Chronic pain of both knees    Advance care planning     POA      Alzheimer's disease ([facility]    Syncope and collapse    Anxiety    Edema    Snoring    Cough    Constipation    Memory loss    Dementia due to medical condition without behavioral disturbance ([facility]    Schizophrenia ([facility]    Mental retardation    High cholesterol    HTN (hypertension)    Mental disability    DJD (degenerative joint disease)       Reviewed PmHx, RxHx, FmHx, SocHx, AllgHx and updated and dated in the chart. Review of Systems - negative except as listed above in the HPI    Objective:     Vitals:    11/28/22 0922   BP: 129/81   Pulse: 84   Resp: 18   Temp: 97.7 °F (36.5 °C)   SpO2: 97%   Weight: 125 lb (56.7 kg)   Height: 4' 11\" (1.499 m)       Assessment/ Plan:   Diagnoses and all orders for this visit:    1. Encounter for routine adult health examination with abnormal findings  See below, needs living will  2. Hypercholesteremia  Labs up-to-date  3. Mental disability  Slightly worsening dementia  4. Dementia due to medical condition without behavioral disturbance ([facility]  As above, continue with current plan to see specialist  5. Alzheimer's disease ([facility]  As above  6. Stage 3a chronic kidney disease (HCC)  -     CBC WITH AUTOMATED DIFF; Future  -     METABOLIC PANEL, COMPREHENSIVE; Future    7. Residual schizophrenia ([facility]  Stable  8. Elevated blood sugar  -     TSH 3RD GENERATION; Future  -     METABOLIC PANEL, COMPREHENSIVE; Future  -     HEMOGLOBIN A1C WITH EAG; Future    9. Primary hypertension  -     CBC WITH AUTOMATED DIFF; Future    10. Needs flu shot  -     INFLUENZA, FLUAD, (AGE 65 Y+), IM, PF, 0.5 ML    11.  Fall, sequela  Refer for PT       -Pain evaluation performed in office  -Cognitive Screen performed in office  -Depression Screen, Fall risks (by up and go test)  and ADL functionality were addressed  -Medication list updated and reviewed for any changes   -A comprehensive review of medical issues and a plan was formulated  -End of life planning was addressed with pt   -Health Screenings for preventions were addressed and a plan was formulated  -Shingles Vaccine was recommended  -Discussed with patient cancer risk factors and appropriate screenings for age  -Patient evaluated for colonoscopy and referred if needed per screeing criteria  -Labs from previous visits were discussed with patient   -Discussed with patient diet and exercise and formulated a plan as needed  -An Advanced care plan was developed with the patient.  -Alcohol screening performed and was negative    -    I have discussed the diagnosis with the patient and the intended plan as seen in the above orders. The patient understands and agrees with the plan. The patient has received an after-visit summary and questions were answered concerning future plans. Medication Side Effects and Warnings were discussed with patien  Patient Labs were reviewed and or requested  Patient Past Records were reviewed and or requested    Patient Instructions   Vaccine Information Statement    Influenza (Flu) Vaccine (Inactivated or Recombinant): What You Need to Know    Many vaccine information statements are available in Mong[place]ian and other languages. See www.immunize.org/vis. Hojas de información sobre vacunas están disponibles en español y en muchos otros idiomas. Visite www.immunize.org/vis. 1. Why get vaccinated? Influenza vaccine can prevent influenza (flu). Flu is a contagious disease that spreads around the [place] every year, usually between October and May. Anyone can get the flu, but it is more dangerous for some people. Infants and young children, people 72 years and older, pregnant people, and people with certain health conditions or a weakened immune system are at greatest risk of flu complications. Pneumonia, bronchitis, sinus infections, and ear infections are examples of flu-related complications.  If you have a medical condition, such as heart disease, cancer, or diabetes, flu can make it worse. Flu can cause fever and chills, sore throat, muscle aches, fatigue, cough, headache, and runny or stuffy nose. Some people may have vomiting and diarrhea, though this is more common in children than adults. In an average year, thousands of people in the [facility] die from flu, and many more are hospitalized. Flu vaccine prevents millions of illnesses and flu-related visits to the doctor each year. 2. Influenza vaccines     CDC recommends everyone 6 months and older get vaccinated every flu season. Children 6 months through 6years of age may need 2 doses during a single flu season. Everyone else needs only 1 dose each flu season. It takes about 2 weeks for protection to develop after vaccination. There are many flu viruses, and they are always changing. Each year a new flu vaccine is made to protect against the influenza viruses believed to be likely to cause disease in the upcoming flu season. Even when the vaccine doesnt exactly match these viruses, it may still provide some protection. Influenza vaccine does not cause flu. Influenza vaccine may be given at the same time as other vaccines. 3. Talk with your health care provider    Tell your vaccination provider if the person getting the vaccine:  Has had an allergic reaction after a previous dose of influenza vaccine, or has any severe, life-threatening allergies   Has ever had Guillain-Barré Syndrome (also called GBS)    In some cases, your health care provider may decide to postpone influenza vaccination until a future visit. Influenza vaccine can be administered at any time during pregnancy. People who are or will be pregnant during influenza season should receive inactivated influenza vaccine. People with minor illnesses, such as a cold, may be vaccinated.  People who are moderately or severely ill should usually wait until they recover before getting influenza vaccine. Your health care provider can give you more information. 4. Risks of a vaccine reaction    Soreness, redness, and swelling where the shot is given, fever, muscle aches, and headache can happen after influenza vaccination. There may be a very small increased risk of Guillain-Barré Syndrome (GBS) after inactivated influenza vaccine (the flu shot). Bela Dailey children who get the flu shot along with pneumococcal vaccine (PCV13) and/or DTaP vaccine at the same time might be slightly more likely to have a seizure caused by fever. Tell your health care provider if a child who is getting flu vaccine has ever had a seizure. People sometimes faint after medical procedures, including vaccination. Tell your provider if you feel dizzy or have vision changes or ringing in the ears. As with any medicine, there is a very remote chance of a vaccine causing a severe allergic reaction, other serious injury, or death. 5. What if there is a serious problem? An allergic reaction could occur after the vaccinated person leaves the clinic. If you see signs of a severe allergic reaction (hives, swelling of the face and throat, difficulty breathing, a fast heartbeat, dizziness, or weakness), call 9-1-1 and get the person to the nearest hospital.    For other signs that concern you, call your health care provider. Adverse reactions should be reported to the Vaccine Adverse Event Reporting System (VAERS). Your health care provider will usually file this report, or you can do it yourself. Visit the VAERS website at www.vaers. hhs.gov or call 0-190.753.8482. VAERS is only for reporting reactions, and VAERS staff members do not give medical advice. 6. The National Vaccine Injury Compensation Program    The [facility] Vaccine Injury Compensation Program (VICP) is a federal program that was created to compensate people who may have been injured by certain vaccines.  Claims regarding alleged injury or death due to vaccination have a time limit for filing, which may be as short as two years. Visit the VICP website at www.hrsa.gov/vaccinecompensation or call 7-407.601.2234 to learn about the program and about filing a claim. 7. How can I learn more? Ask your health care provider. Call your local or state health department. Visit the website of the Food and Drug Administration (FDA) for vaccine package inserts and additional information at www.fda.gov/vaccines-blood-biologics/vaccines. Contact the Centers for Disease Control and Prevention (CDC): Call 4-769.641.8462 (1-800-CDC-INFO) or  Visit CDCs influenza website at www.cdc.gov/flu. Vaccine Information Statement   Inactivated Influenza Vaccine   8/6/2021  [address] [id number]     Department of Health and Human Services  Centers for Disease Control and Prevention    Office Use Only           Magen Capone M.D.

## 2022-11-28 NOTE — PROGRESS NOTES
Chief Complaint   Patient presents with    Labs    Immunization/Injection     Flu shot    Hypertension    Cholesterol Problem    Decreased Appetite     Needs ensure 2 times daily    Other     Face to Face for manual wheelchair    Extremity Weakness     Requests PT      Patient requires a light weight manual wheelchair with arm rests and foot pedals. Patient has a diagnosis of Dementia: Alzheimer's disease resulting in mobility limitations. Patient is unable to use a cane or walker due to weakness of lower extremities and balance difficulties. The patient's home provides adequate room for a light weight manual wheelchair to maneuver in spaces and between rooms. Patient and/or care giver is available and willing to safely use manual wheelchair for its purpose of getting the patient around the home safely, and more independently to provide ADL's.     1. Have you been to the ER, urgent care clinic since your last visit? Hospitalized since your last visit? No     2. Have you seen or consulted any other health care providers outside of the [address] [place] [address] since your last visit? Include any pap smears or colon screening.  No

## 2022-11-28 NOTE — PATIENT INSTRUCTIONS
Vaccine Information Statement    Influenza (Flu) Vaccine (Inactivated or Recombinant): What You Need to Know    Many vaccine information statements are available in [place] and other languages. See www.immunize.org/vis. Hojas de información sobre vacunas están disponibles en español y en muchos otros idiomas. Visite www.immunize.org/vis. 1. Why get vaccinated? Influenza vaccine can prevent influenza (flu). Flu is a contagious disease that spreads around the [facility] every year, usually between October and May. Anyone can get the flu, but it is more dangerous for some people. Infants and young children, people 72 years and older, pregnant people, and people with certain health conditions or a weakened immune system are at greatest risk of flu complications. Pneumonia, bronchitis, sinus infections, and ear infections are examples of flu-related complications. If you have a medical condition, such as heart disease, cancer, or diabetes, flu can make it worse. Flu can cause fever and chills, sore throat, muscle aches, fatigue, cough, headache, and runny or stuffy nose. Some people may have vomiting and diarrhea, though this is more common in children than adults. In an average year, thousands of people in the [facility] die from flu, and many more are hospitalized. Flu vaccine prevents millions of illnesses and flu-related visits to the doctor each year. 2. Influenza vaccines     CDC recommends everyone 6 months and older get vaccinated every flu season. Children 6 months through 6years of age may need 2 doses during a single flu season. Everyone else needs only 1 dose each flu season. It takes about 2 weeks for protection to develop after vaccination. There are many flu viruses, and they are always changing. Each year a new flu vaccine is made to protect against the influenza viruses believed to be likely to cause disease in the upcoming flu season.  Even when the vaccine doesnt exactly match these viruses, it may still provide some protection. Influenza vaccine does not cause flu. Influenza vaccine may be given at the same time as other vaccines. 3. Talk with your health care provider    Tell your vaccination provider if the person getting the vaccine:  Has had an allergic reaction after a previous dose of influenza vaccine, or has any severe, life-threatening allergies   Has ever had Guillain-Barré Syndrome (also called GBS)    In some cases, your health care provider may decide to postpone influenza vaccination until a future visit. Influenza vaccine can be administered at any time during pregnancy. People who are or will be pregnant during influenza season should receive inactivated influenza vaccine. People with minor illnesses, such as a cold, may be vaccinated. People who are moderately or severely ill should usually wait until they recover before getting influenza vaccine. Your health care provider can give you more information. 4. Risks of a vaccine reaction    Soreness, redness, and swelling where the shot is given, fever, muscle aches, and headache can happen after influenza vaccination. There may be a very small increased risk of Guillain-Barré Syndrome (GBS) after inactivated influenza vaccine (the flu shot). Paresh Power children who get the flu shot along with pneumococcal vaccine (PCV13) and/or DTaP vaccine at the same time might be slightly more likely to have a seizure caused by fever. Tell your health care provider if a child who is getting flu vaccine has ever had a seizure. People sometimes faint after medical procedures, including vaccination. Tell your provider if you feel dizzy or have vision changes or ringing in the ears. As with any medicine, there is a very remote chance of a vaccine causing a severe allergic reaction, other serious injury, or death. 5. What if there is a serious problem?     An allergic reaction could occur after the vaccinated person leaves the clinic. If you see signs of a severe allergic reaction (hives, swelling of the face and throat, difficulty breathing, a fast heartbeat, dizziness, or weakness), call 9-1-1 and get the person to the nearest hospital.    For other signs that concern you, call your health care provider. Adverse reactions should be reported to the Vaccine Adverse Event Reporting System (VAERS). Your health care provider will usually file this report, or you can do it yourself. Visit the VAERS website at www.vaers. [facility].gov or call 0-610.264.4309. VAERS is only for reporting reactions, and VAERS staff members do not give medical advice. 6. The National Vaccine Injury Compensation Program    The [facility] Vaccine Injury Compensation Program (VICP) is a federal program that was created to compensate people who may have been injured by certain vaccines. Claims regarding alleged injury or death due to vaccination have a time limit for filing, which may be as short as two years. Visit the VICP website at www.[facility].gov/vaccinecompensation or call 2-131.419.7791 to learn about the program and about filing a claim. 7. How can I learn more? Ask your health care provider. Call your local or state health department. Visit the website of the Food and Drug Administration (FDA) for vaccine package inserts and additional information at www.fda.gov/vaccines-blood-biologics/vaccines. Contact the Centers for Disease Control and Prevention (CDC): Call 4-672.857.7059 (1-800-CDC-INFO) or  Visit CDCs influenza website at www.cdc.gov/flu. Vaccine Information Statement   Inactivated Influenza Vaccine   8/6/2021  42 [address] 471EE-44   Department of Health and Human Services  Centers for Disease Control and Prevention    Office Use Only

## 2022-11-29 ENCOUNTER — TELEPHONE (OUTPATIENT)
Dept: FAMILY MEDICINE CLINIC | Age: 77
End: 2022-11-29

## 2022-11-29 ENCOUNTER — DOCUMENTATION ONLY (OUTPATIENT)
Dept: FAMILY MEDICINE CLINIC | Age: 77
End: 2022-11-29

## 2022-11-29 LAB
ALBUMIN SERPL-MCNC: 3.8 G/DL (ref 3.5–5)
ALBUMIN/GLOB SERPL: 1.2 {RATIO} (ref 1.1–2.2)
ALP SERPL-CCNC: 102 U/L (ref 45–117)
ALT SERPL-CCNC: 23 U/L (ref 12–78)
ANION GAP SERPL CALC-SCNC: 7 MMOL/L (ref 5–15)
AST SERPL-CCNC: 19 U/L (ref 15–37)
BASOPHILS # BLD: 0.1 K/UL (ref 0–0.1)
BASOPHILS NFR BLD: 1 % (ref 0–1)
BILIRUB SERPL-MCNC: 0.4 MG/DL (ref 0.2–1)
BUN SERPL-MCNC: 18 MG/DL (ref 6–20)
BUN/CREAT SERPL: 17 (ref 12–20)
CALCIUM SERPL-MCNC: 10.1 MG/DL (ref 8.5–10.1)
CHLORIDE SERPL-SCNC: 102 MMOL/L (ref 97–108)
CO2 SERPL-SCNC: 29 MMOL/L (ref 21–32)
CREAT SERPL-MCNC: 1.05 MG/DL (ref 0.55–1.02)
DIFFERENTIAL METHOD BLD: NORMAL
EOSINOPHIL # BLD: 0.1 K/UL (ref 0–0.4)
EOSINOPHIL NFR BLD: 1 % (ref 0–7)
ERYTHROCYTE [DISTWIDTH] IN BLOOD BY AUTOMATED COUNT: 13.2 % (ref 11.5–14.5)
EST. AVERAGE GLUCOSE BLD GHB EST-MCNC: 108 MG/DL
GLOBULIN SER CALC-MCNC: 3.2 G/DL (ref 2–4)
GLUCOSE SERPL-MCNC: 128 MG/DL (ref 65–100)
HBA1C MFR BLD: 5.4 % (ref 4–5.6)
HCT VFR BLD AUTO: 38 % (ref 35–47)
HGB BLD-MCNC: 11.8 G/DL (ref 11.5–16)
IMM GRANULOCYTES # BLD AUTO: 0 K/UL (ref 0–0.04)
IMM GRANULOCYTES NFR BLD AUTO: 0 % (ref 0–0.5)
LYMPHOCYTES # BLD: 1.4 K/UL (ref 0.8–3.5)
LYMPHOCYTES NFR BLD: 15 % (ref 12–49)
MCH RBC QN AUTO: 29 PG (ref 26–34)
MCHC RBC AUTO-ENTMCNC: 31.1 G/DL (ref 30–36.5)
MCV RBC AUTO: 93.4 FL (ref 80–99)
MONOCYTES # BLD: 0.8 K/UL (ref 0–1)
MONOCYTES NFR BLD: 9 % (ref 5–13)
NEUTS SEG # BLD: 7 K/UL (ref 1.8–8)
NEUTS SEG NFR BLD: 74 % (ref 32–75)
NRBC # BLD: 0 K/UL (ref 0–0.01)
NRBC BLD-RTO: 0 PER 100 WBC
PLATELET # BLD AUTO: 319 K/UL (ref 150–400)
PMV BLD AUTO: 10.9 FL (ref 8.9–12.9)
POTASSIUM SERPL-SCNC: 4.5 MMOL/L (ref 3.5–5.1)
PROT SERPL-MCNC: 7 G/DL (ref 6.4–8.2)
RBC # BLD AUTO: 4.07 M/UL (ref 3.8–5.2)
SODIUM SERPL-SCNC: 138 MMOL/L (ref 136–145)
TSH SERPL DL<=0.05 MIU/L-ACNC: 1.57 UIU/ML (ref 0.36–3.74)
WBC # BLD AUTO: 9.4 K/UL (ref 3.6–11)

## 2022-11-29 NOTE — TELEPHONE ENCOUNTER
Called and spoke to Sukumar from [address] [place] [address],[address] regarding w/c order from 6/8/22. She states order was sent to [facility] since patient is out of geographical range. She will follow up with [facility] today. Yesterday's office note faxed to Sukumar at [phone].  Confirmed

## 2022-12-01 NOTE — TELEPHONE ENCOUNTER
No PA needed for benztropine-The patient currently has access to the requested medication and a Prior Authorization is not needed for the patient/medication per ins.

## 2022-12-05 ENCOUNTER — DOCUMENTATION ONLY (OUTPATIENT)
Dept: FAMILY MEDICINE CLINIC | Age: 77
End: 2022-12-05

## 2022-12-07 ENCOUNTER — DOCUMENTATION ONLY (OUTPATIENT)
Dept: FAMILY MEDICINE CLINIC | Age: 77
End: 2022-12-07

## 2022-12-07 NOTE — PROGRESS NOTES
[facility] request was signed & faxed to [phone],SARA KHAN placed in scan folder to be scanned to chart.

## 2022-12-08 ENCOUNTER — DOCUMENTATION ONLY (OUTPATIENT)
Dept: FAMILY MEDICINE CLINIC | Age: 77
End: 2022-12-08

## 2022-12-12 ENCOUNTER — DOCUMENTATION ONLY (OUTPATIENT)
Dept: FAMILY MEDICINE CLINIC | Age: 77
End: 2022-12-12

## 2022-12-12 NOTE — PROGRESS NOTES
Marii Cox PT POC was signed & faxed to [phone],[facility] placed in scan folder to be scanned to chart.

## 2022-12-13 ENCOUNTER — DOCUMENTATION ONLY (OUTPATIENT)
Dept: FAMILY MEDICINE CLINIC | Age: 77
End: 2022-12-13

## 2022-12-13 NOTE — PROGRESS NOTES
Home Care Delivered CMN for durable medical was put on [facility] ANDRADE desk to process

## 2022-12-19 ENCOUNTER — DOCUMENTATION ONLY (OUTPATIENT)
Dept: FAMILY MEDICINE CLINIC | Age: 77
End: 2022-12-19

## 2022-12-19 DIAGNOSIS — E78.00 HYPERCHOLESTEREMIA: ICD-10-CM

## 2022-12-19 RX ORDER — BENZTROPINE MESYLATE 1 MG/1
TABLET ORAL
Qty: 180 TABLET | Refills: 2 | Status: SHIPPED | OUTPATIENT
Start: 2022-12-19

## 2022-12-19 RX ORDER — SIMVASTATIN 40 MG/1
TABLET, FILM COATED ORAL
Qty: 90 TABLET | Refills: 1 | Status: SHIPPED | OUTPATIENT
Start: 2022-12-19

## 2022-12-19 RX ORDER — PERPHENAZINE 2 MG/1
2 TABLET, FILM COATED ORAL 2 TIMES DAILY
Qty: 180 TABLET | Refills: 1 | Status: SHIPPED | OUTPATIENT
Start: 2022-12-19

## 2022-12-21 ENCOUNTER — DOCUMENTATION ONLY (OUTPATIENT)
Dept: FAMILY MEDICINE CLINIC | Age: 77
End: 2022-12-21

## 2022-12-21 NOTE — PROGRESS NOTES
Home Care Delivered DME CMN request was signed & faxed to [phone],ok,Copy placed in scan folder to be scanned to chart.

## 2022-12-22 ENCOUNTER — PATIENT MESSAGE (OUTPATIENT)
Dept: FAMILY MEDICINE CLINIC | Age: 77
End: 2022-12-22

## 2022-12-22 NOTE — TELEPHONE ENCOUNTER
----- Message from Rony Grubbs MD sent at 12/22/2022 11:24 AM EST -----  Regarding: FW: Ensure being delivered by Home Care and  paid by Medicaid.    ----- Message -----  From: Jason Sullivan  Sent: 12/22/2022  10:38 AM EST  To: Rony Grubbs MD  Subject: Ensure being delivered by Home Care and  keshawn#    I just talked to [facility] and they are still waiting for Dr. Glenny Eduardo approval for Ensure to be delivered to Jason Sullivan. Flaca's  suggested this be done. We have spoken to [facility] about this. Thank you so much. If there are questions, please call me at [phone].

## 2022-12-22 NOTE — TELEPHONE ENCOUNTER
Called [facility] and spoke to:   Medical Documentation Representative. Roxanne Zhang)    Iona Glover confirms: [facility] DME CMN request was received and sent to be filled for patient. Iona Glover states understanding to contact [facility] office if any issues with filling order. Called and spoke to patient's care giver. (Sister Claudia Farley on HIPAA)  Informed sister, per Iona Glover: order in process.

## 2022-12-27 ENCOUNTER — DOCUMENTATION ONLY (OUTPATIENT)
Dept: FAMILY MEDICINE CLINIC | Age: 77
End: 2022-12-27

## 2023-01-06 ENCOUNTER — DOCUMENTATION ONLY (OUTPATIENT)
Dept: FAMILY MEDICINE CLINIC | Age: 78
End: 2023-01-06

## 2023-01-09 ENCOUNTER — DOCUMENTATION ONLY (OUTPATIENT)
Dept: FAMILY MEDICINE CLINIC | Age: 78
End: 2023-01-09

## 2023-01-09 NOTE — PROGRESS NOTES
Marichuy Peres POC was signed & faxed to [phone],CYNTHIA CHEW placed in scan folder to be scanned to chart.

## 2023-01-23 RX ORDER — BENZTROPINE MESYLATE 1 MG/1
TABLET ORAL
Qty: 180 TABLET | Refills: 2 | Status: SHIPPED | OUTPATIENT
Start: 2023-01-23

## 2023-01-26 ENCOUNTER — OFFICE VISIT (OUTPATIENT)
Dept: NEUROLOGY | Age: 78
End: 2023-01-26
Payer: MEDICARE

## 2023-01-26 VITALS
SYSTOLIC BLOOD PRESSURE: 138 MMHG | DIASTOLIC BLOOD PRESSURE: 76 MMHG | HEART RATE: 86 BPM | OXYGEN SATURATION: 97 % | RESPIRATION RATE: 16 BRPM

## 2023-01-26 DIAGNOSIS — G25.0 ESSENTIAL TREMOR: ICD-10-CM

## 2023-01-26 DIAGNOSIS — G25.0 ESSENTIAL TREMOR: Primary | ICD-10-CM

## 2023-01-26 PROCEDURE — 1101F PT FALLS ASSESS-DOCD LE1/YR: CPT | Performed by: SPECIALIST

## 2023-01-26 PROCEDURE — 3075F SYST BP GE 130 - 139MM HG: CPT | Performed by: SPECIALIST

## 2023-01-26 PROCEDURE — 1123F ACP DISCUSS/DSCN MKR DOCD: CPT | Performed by: SPECIALIST

## 2023-01-26 PROCEDURE — G8399 PT W/DXA RESULTS DOCUMENT: HCPCS | Performed by: SPECIALIST

## 2023-01-26 PROCEDURE — G8510 SCR DEP NEG, NO PLAN REQD: HCPCS | Performed by: SPECIALIST

## 2023-01-26 PROCEDURE — 99214 OFFICE O/P EST MOD 30 MIN: CPT | Performed by: SPECIALIST

## 2023-01-26 PROCEDURE — G8427 DOCREV CUR MEDS BY ELIG CLIN: HCPCS | Performed by: SPECIALIST

## 2023-01-26 PROCEDURE — G8536 NO DOC ELDER MAL SCRN: HCPCS | Performed by: SPECIALIST

## 2023-01-26 PROCEDURE — 1090F PRES/ABSN URINE INCON ASSESS: CPT | Performed by: SPECIALIST

## 2023-01-26 PROCEDURE — G8417 CALC BMI ABV UP PARAM F/U: HCPCS | Performed by: SPECIALIST

## 2023-01-26 PROCEDURE — 3078F DIAST BP <80 MM HG: CPT | Performed by: SPECIALIST

## 2023-01-26 RX ORDER — PROPRANOLOL HYDROCHLORIDE 60 MG/1
60 CAPSULE, EXTENDED RELEASE ORAL DAILY
Qty: 30 CAPSULE | Refills: 5 | Status: SHIPPED | OUTPATIENT
Start: 2023-01-26

## 2023-01-26 RX ORDER — PROPRANOLOL HYDROCHLORIDE 60 MG/1
60 CAPSULE, EXTENDED RELEASE ORAL DAILY
Qty: 30 CAPSULE | Refills: 5 | Status: SHIPPED | OUTPATIENT
Start: 2023-01-26 | End: 2023-01-26 | Stop reason: SDUPTHER

## 2023-01-26 NOTE — PROGRESS NOTES
Neurology Consult      Subjective: Deysi Champagne is a 68 y.o. female who comes in today with family. Has baseline dementia without behavioral disturbance. Is on the drugs Namenda extended release 28 mg and Exelon patch 9.5 mg daily. As before has issues with isolation and has a baseline poor appetite and has to be guided to her food etc.  Also retains that habit of left facial nervous tics that amount to touching her face I suspicion when she gets nervous. Otherwise is rather reclusive and I have never really seen her initiate any type of conversation or respond to questioning or the verbal exchange. I have a feeling its perhaps part of her innate character and I would call at a form of inanition which is a loss of cognitive and spiritual vigor and enthusiasm. Family also brought up a tremor she has which looks very convincing for an essential tremor by characteristics amplitude and other attributes. We will try the drug Inderal LA 60 mg to suppress the amplitude and see how that goes. As I understand it does not have any rate limiting cardiopulmonary issues etc.    Suggest a revisit in 6 months and hopefully her transition will be smooth. Current Outpatient Medications   Medication Sig Dispense Refill    propranolol LA (INDERAL LA) 60 mg SR capsule Take 1 Capsule by mouth daily. Indications: essential tremor 30 Capsule 5    benztropine (COGENTIN) 1 mg tablet TAKE 1 TABLET BY MOUTH TWICE A  Tablet 2    simvastatin (ZOCOR) 40 mg tablet TAKE 1 TABLET BY MOUTH EVERY DAY AT NIGHT 90 Tablet 1    perphenazine (TRILAFON) 2 mg tablet Take 1 Tablet by mouth two (2) times a day. 180 Tablet 1    rivastigmine (EXELON) 9.5 mg/24 hour patch APPLY ONE PATCH TO THE SKIN DAILY. 90 Patch 1    memantine ER (NAMENDA XR) 28 mg capsule TAKE 1 CAP BY MOUTH DAILY.  FOR MODERATE TO SEVERE ALZHEIMER'S TYPE DEMENTIA 90 Capsule 1    azelastine (OPTIVAR) 0.05 % ophthalmic solution ADMINISTER 1 DROP TO BOTH EYES TWO (2) TIMES A DAY. USE IN AFFECTED EYE(S) 18 mL 2    lisinopriL (PRINIVIL, ZESTRIL) 20 mg tablet TAKE 1 TABLET BY MOUTH EVERY DAY 90 Tablet 3    peg 400-propylene glycol (Systane, propylene glycol,) 0.4-0.3 % drop as needed. nystatin (MYCOSTATIN) powder Apply  to affected area four (4) times daily. 1 Bottle 5    triamcinolone acetonide (KENALOG) 0.1 % topical cream Apply  to affected area daily.  To lip 15 g 0      No Known Allergies  Past Medical History:   Diagnosis Date    Anxiety     Constipation     Cough     Degenerative joint disease of knee     both knees    Edema     Hypercholesterolemia     Hypertension     Memory loss     MR (mental retardation)     Snoring       Past Surgical History:   Procedure Laterality Date    COLONOSCOPY  2008    neg    HX BREAST BIOPSY Right 2012    neg; stereotactic bx    HX CATARACT REMOVAL        Social History     Socioeconomic History    Marital status: SINGLE     Spouse name: Not on file    Number of children: Not on file    Years of education: Not on file    Highest education level: Not on file   Occupational History    Not on file   Tobacco Use    Smoking status: Never    Smokeless tobacco: Never   Substance and Sexual Activity    Alcohol use: No    Drug use: No    Sexual activity: Not Currently   Other Topics Concern    Not on file   Social History Narrative    Not on file     Social Determinants of Health     Financial Resource Strain: Not on file   Food Insecurity: Not on file   Transportation Needs: Not on file   Physical Activity: Not on file   Stress: Not on file   Social Connections: Not on file   Intimate Partner Violence: Not on file   Housing Stability: Not on file      Family History   Problem Relation Age of Onset    Heart Disease Mother     Hypertension Mother     Elevated Lipids Mother     Hypertension Sister     Elevated Lipids Sister     Breast Cancer Sister 77    Elevated Lipids Brother     Hypertension Brother     Elevated Lipids Sister     Hypertension Sister       Visit Vitals  /76 (BP 1 Location: Left arm, BP Patient Position: Sitting)   Pulse 86   Resp 16   SpO2 97%        Review of Systems:   A comprehensive review of systems was negative except for that written in the HPI. Neuro Exam:     Appearance: The patient is well developed, well nourished, and unable to provide a coherent history and is in no acute distress. Mental Status: Oriented to name by inference. Mood and affect appropriate to baseline which is withdrawn but alert and fairly poor eye contact and no verbalization etc.   Cranial Nerves:   Intact visual fields. Fundi are benign. ARNALDO, EOM's full, no nystagmus, no ptosis. Facial sensation is normal. Corneal reflexes are intact. Facial movement is symmetric. Hearing is normal bilaterally. Palate is midline with normal sternocleidomastoid and trapezius muscles are normal. Tongue is midline. Motor:  5/5 strength in upper and lower proximal and distal muscles. Normal bulk and tone. No fasciculations. Reflexes:   Deep tendon reflexes 2+/4 and symmetrical.   Sensory:   Normal to touch, pinprick and vibration. Gait:  Came in with her Rollator and was at a comfortable approach in the office. Tremor:   Patient had a mild plus grade amplitude tremor in both hands and posture and action and intention modes. Cerebellar:  No cerebellar signs present. Neurovascular:  Normal heart sounds and regular rhythm, peripheral pulses intact, and no carotid bruits. Assessment:   Dementia without behavioral disturbance. Will suggest continuation of the memory support drugs Namenda extended release 28 mg and the Exelon patch 9.5 mg.  Please see above discussion regarding her up-to-date attributes offered by family and watching her appetite and weight etc.    Essential tremor. Has essential tremor type characteristics in the hands and will intervene with Inderal LA 60 mg daily. Plan:   Revisit 6 months. Signed by :  Juan Nur Faizan Squires MD

## 2023-01-26 NOTE — LETTER
1/26/2023    Patient: Deysi Champagne   YOB: 1945   Date of Visit: 1/26/2023     Sami Awad, [address] [facility] [address]  [address] [facility] [address]    Dear Sami Awad MD,      Thank you for referring Ms. Deysi Champagne to [address] [place] [address] for evaluation. My notes for this consultation are attached. If you have questions, please do not hesitate to call me. I look forward to following your patient along with you.       Sincerely,    Rick Salazar MD

## 2023-01-26 NOTE — PATIENT INSTRUCTIONS
Patient history viewed patient examined. We will keep the Namenda and Exelon patch in place to help support memory. On the tremor feature we will go with the drug propranolol extended release lowest dose and see how that works. Will suggest a revisit in 6 months. Been a pleasure working with you and wish you the best of everything.

## 2023-03-07 RX ORDER — MEMANTINE HYDROCHLORIDE 28 MG/1
CAPSULE, EXTENDED RELEASE ORAL
Qty: 90 CAPSULE | Refills: 1 | Status: SHIPPED | OUTPATIENT
Start: 2023-03-07

## 2023-03-23 ENCOUNTER — DOCUMENTATION ONLY (OUTPATIENT)
Dept: FAMILY MEDICINE CLINIC | Age: 78
End: 2023-03-23

## 2023-03-27 ENCOUNTER — DOCUMENTATION ONLY (OUTPATIENT)
Dept: FAMILY MEDICINE CLINIC | Age: 78
End: 2023-03-27

## 2023-03-27 NOTE — PROGRESS NOTES
[address] [facility] wheelchair order was signed & faxed to [phone] FARD placed in scan folder to be scanned to chart. spouse

## 2023-05-08 RX ORDER — RIVASTIGMINE 9.5 MG/24H
PATCH, EXTENDED RELEASE TRANSDERMAL
Qty: 30 PATCH | Refills: 2 | Status: SHIPPED | OUTPATIENT
Start: 2023-05-08

## 2023-06-21 DIAGNOSIS — I10 ESSENTIAL (PRIMARY) HYPERTENSION: ICD-10-CM

## 2023-06-21 DIAGNOSIS — G25.0 ESSENTIAL TREMOR: Primary | ICD-10-CM

## 2023-06-21 DIAGNOSIS — E78.00 PURE HYPERCHOLESTEROLEMIA, UNSPECIFIED: ICD-10-CM

## 2023-06-22 RX ORDER — PERPHENAZINE 2 MG/1
TABLET ORAL
Qty: 180 TABLET | Refills: 1 | Status: SHIPPED | OUTPATIENT
Start: 2023-06-22

## 2023-06-22 RX ORDER — RIVASTIGMINE 9.5 MG/24H
PATCH, EXTENDED RELEASE TRANSDERMAL
Qty: 30 PATCH | Refills: 2 | Status: SHIPPED | OUTPATIENT
Start: 2023-06-22

## 2023-06-22 RX ORDER — LISINOPRIL 20 MG/1
TABLET ORAL
Qty: 90 TABLET | Refills: 3 | Status: SHIPPED | OUTPATIENT
Start: 2023-06-22

## 2023-06-22 RX ORDER — SIMVASTATIN 40 MG
TABLET ORAL
Qty: 90 TABLET | Refills: 1 | Status: SHIPPED | OUTPATIENT
Start: 2023-06-22

## 2023-06-28 RX ORDER — PROPRANOLOL HCL 60 MG
60 CAPSULE, EXTENDED RELEASE 24HR ORAL DAILY
Qty: 30 CAPSULE | Refills: 0 | Status: SHIPPED | OUTPATIENT
Start: 2023-06-28 | End: 2023-07-28

## 2023-07-01 ENCOUNTER — HOSPITAL ENCOUNTER (EMERGENCY)
Facility: HOSPITAL | Age: 78
Discharge: HOME | End: 2023-07-01
Attending: EMERGENCY MEDICINE
Payer: MEDICARE

## 2023-07-01 ENCOUNTER — APPOINTMENT (OUTPATIENT)
Facility: HOSPITAL | Age: 78
End: 2023-07-01
Payer: MEDICARE

## 2023-07-01 VITALS
RESPIRATION RATE: 19 BRPM | BODY MASS INDEX: 26.77 KG/M2 | WEIGHT: 132.8 LBS | SYSTOLIC BLOOD PRESSURE: 160 MMHG | HEIGHT: 59 IN | TEMPERATURE: 98.6 F | OXYGEN SATURATION: 94 % | DIASTOLIC BLOOD PRESSURE: 80 MMHG | HEART RATE: 82 BPM

## 2023-07-01 DIAGNOSIS — H10.32 ACUTE CONJUNCTIVITIS OF LEFT EYE, UNSPECIFIED ACUTE CONJUNCTIVITIS TYPE: Primary | ICD-10-CM

## 2023-07-01 DIAGNOSIS — J18.9 PNEUMONIA OF LEFT LOWER LOBE DUE TO INFECTIOUS ORGANISM: ICD-10-CM

## 2023-07-01 LAB
ANION GAP SERPL CALC-SCNC: 9 MMOL/L (ref 5–15)
BASOPHILS # BLD: 0 K/UL (ref 0–1)
BASOPHILS NFR BLD: 0 % (ref 0–1)
BUN SERPL-MCNC: 16 MG/DL (ref 8–23)
BUN/CREAT SERPL: 21 (ref 12–20)
CALCIUM SERPL-MCNC: 8.6 MG/DL (ref 8.8–10.2)
CHLORIDE SERPL-SCNC: 101 MMOL/L (ref 98–107)
CO2 SERPL-SCNC: 27 MMOL/L (ref 22–29)
CREAT SERPL-MCNC: 0.75 MG/DL (ref 0.5–0.9)
DIFFERENTIAL METHOD BLD: ABNORMAL
EOSINOPHIL # BLD: 0.1 K/UL (ref 0–0.4)
EOSINOPHIL NFR BLD: 1 %
ERYTHROCYTE [DISTWIDTH] IN BLOOD BY AUTOMATED COUNT: 13.2 % (ref 11.5–14.5)
GLUCOSE SERPL-MCNC: 108 MG/DL (ref 65–100)
HCT VFR BLD AUTO: 30.6 % (ref 35–47)
HGB BLD-MCNC: 10.1 G/DL (ref 11.5–16)
IMM GRANULOCYTES # BLD AUTO: 0.1 K/UL (ref 0–0.04)
IMM GRANULOCYTES NFR BLD AUTO: 1 % (ref 0–0.5)
LYMPHOCYTES # BLD: 1.7 K/UL (ref 0.8–3.5)
LYMPHOCYTES NFR BLD: 15 % (ref 12–49)
MCH RBC QN AUTO: 29 PG (ref 26–34)
MCHC RBC AUTO-ENTMCNC: 33 G/DL (ref 30–36.5)
MCV RBC AUTO: 87.9 FL (ref 80–99)
MONOCYTES # BLD: 1.9 K/UL (ref 0–1)
MONOCYTES NFR BLD: 16 % (ref 5–13)
NEUTS SEG # BLD: 7.9 K/UL (ref 1.8–8)
NEUTS SEG NFR BLD: 67 % (ref 32–75)
NRBC # BLD: 0 K/UL (ref 0–0.01)
NRBC BLD-RTO: 0 PER 100 WBC
PLATELET # BLD AUTO: 293 K/UL (ref 150–400)
PMV BLD AUTO: 9.6 FL (ref 8.9–12.9)
POTASSIUM SERPL-SCNC: 4.2 MMOL/L (ref 3.5–5.1)
RBC # BLD AUTO: 3.48 M/UL (ref 3.8–5.2)
SODIUM SERPL-SCNC: 137 MMOL/L (ref 136–145)
WBC # BLD AUTO: 11.7 K/UL (ref 3.6–11)

## 2023-07-01 PROCEDURE — 85025 COMPLETE CBC W/AUTO DIFF WBC: CPT

## 2023-07-01 PROCEDURE — 36415 COLL VENOUS BLD VENIPUNCTURE: CPT

## 2023-07-01 PROCEDURE — 71045 X-RAY EXAM CHEST 1 VIEW: CPT

## 2023-07-01 PROCEDURE — 6370000000 HC RX 637 (ALT 250 FOR IP): Performed by: EMERGENCY MEDICINE

## 2023-07-01 PROCEDURE — 99284 EMERGENCY DEPT VISIT MOD MDM: CPT

## 2023-07-01 PROCEDURE — 80048 BASIC METABOLIC PNL TOTAL CA: CPT

## 2023-07-01 RX ORDER — DOXYCYCLINE HYCLATE 100 MG
100 TABLET ORAL
Status: COMPLETED | OUTPATIENT
Start: 2023-07-01 | End: 2023-07-01

## 2023-07-01 RX ORDER — ERYTHROMYCIN 5 MG/G
OINTMENT OPHTHALMIC
Qty: 1 G | Refills: 0 | Status: SHIPPED | OUTPATIENT
Start: 2023-07-01 | End: 2023-07-11

## 2023-07-01 RX ORDER — BENZONATATE 100 MG/1
100 CAPSULE ORAL 3 TIMES DAILY PRN
Qty: 30 CAPSULE | Refills: 0 | Status: SHIPPED | OUTPATIENT
Start: 2023-07-01 | End: 2023-07-11

## 2023-07-01 RX ORDER — ALBUTEROL SULFATE 90 UG/1
2 AEROSOL, METERED RESPIRATORY (INHALATION) EVERY 4 HOURS PRN
Qty: 18 G | Refills: 0 | Status: SHIPPED | OUTPATIENT
Start: 2023-07-01

## 2023-07-01 RX ORDER — IPRATROPIUM BROMIDE AND ALBUTEROL SULFATE 2.5; .5 MG/3ML; MG/3ML
1 SOLUTION RESPIRATORY (INHALATION)
Status: COMPLETED | OUTPATIENT
Start: 2023-07-01 | End: 2023-07-01

## 2023-07-01 RX ORDER — DOXYCYCLINE HYCLATE 100 MG
100 TABLET ORAL 2 TIMES DAILY
Qty: 20 TABLET | Refills: 0 | Status: SHIPPED | OUTPATIENT
Start: 2023-07-01 | End: 2023-07-11

## 2023-07-01 RX ADMIN — DOXYCYCLINE HYCLATE 100 MG: 100 TABLET, COATED ORAL at 21:31

## 2023-07-01 RX ADMIN — IPRATROPIUM BROMIDE AND ALBUTEROL SULFATE 1 DOSE: .5; 3 SOLUTION RESPIRATORY (INHALATION) at 19:54

## 2023-07-01 ASSESSMENT — ENCOUNTER SYMPTOMS
SINUS CONGESTION: 1
COUGH: 1
EYE DISCHARGE: 1
WHEEZING: 0

## 2023-07-01 ASSESSMENT — PAIN - FUNCTIONAL ASSESSMENT
PAIN_FUNCTIONAL_ASSESSMENT: NONE - DENIES PAIN
PAIN_FUNCTIONAL_ASSESSMENT: NONE - DENIES PAIN

## 2023-07-03 ENCOUNTER — TELEPHONE (OUTPATIENT)
Age: 78
End: 2023-07-03

## 2023-07-03 NOTE — TELEPHONE ENCOUNTER
----- Message from Gareth Flores sent at 7/3/2023 10:14 AM EDT -----  Subject: Message to Provider    QUESTIONS  Information for Provider? Patient has Pneumonia, needs to be seen in   person asap. Please call the patient to schedule an appointment.   ---------------------------------------------------------------------------  --------------  [address]  [phone]; OK to leave message on voicemail  ---------------------------------------------------------------------------  --------------  SCRIPT ANSWERS  Relationship to Patient? Other/Third Party  Representative Name? Juice Andrade   Is the representative on the Communication Release of Information (LEON)   form in Epic?  Yes

## 2023-07-05 NOTE — TELEPHONE ENCOUNTER
Returned call to sister Lucie Andrew. Appt scheduled for both on 7/25 as requested. Both following up on Pneumonia. On antibiotics, inhalers and cough pills. Slowly improving. Reminded to complete all medications from ED, And if condition worsens, [facility].  ( Patient states it is very hard to connect to our office due to several transfers and putting on hold)

## 2023-07-25 ENCOUNTER — OFFICE VISIT (OUTPATIENT)
Age: 78
End: 2023-07-25
Payer: MEDICARE

## 2023-07-25 VITALS
BODY MASS INDEX: 25.58 KG/M2 | SYSTOLIC BLOOD PRESSURE: 139 MMHG | HEIGHT: 59 IN | OXYGEN SATURATION: 95 % | WEIGHT: 126.9 LBS | DIASTOLIC BLOOD PRESSURE: 84 MMHG | RESPIRATION RATE: 16 BRPM | TEMPERATURE: 98.3 F | HEART RATE: 81 BPM

## 2023-07-25 DIAGNOSIS — J18.9 PNEUMONIA DUE TO INFECTIOUS ORGANISM, UNSPECIFIED LATERALITY, UNSPECIFIED PART OF LUNG: Primary | ICD-10-CM

## 2023-07-25 DIAGNOSIS — I10 PRIMARY HYPERTENSION: ICD-10-CM

## 2023-07-25 DIAGNOSIS — F03.918 DEMENTIA WITH BEHAVIORAL DISTURBANCE (HCC): ICD-10-CM

## 2023-07-25 DIAGNOSIS — N18.31 CHRONIC KIDNEY DISEASE, STAGE 3A (HCC): ICD-10-CM

## 2023-07-25 DIAGNOSIS — F20.5 RESIDUAL SCHIZOPHRENIA (HCC): ICD-10-CM

## 2023-07-25 PROCEDURE — 1036F TOBACCO NON-USER: CPT | Performed by: FAMILY MEDICINE

## 2023-07-25 PROCEDURE — G8419 CALC BMI OUT NRM PARAM NOF/U: HCPCS | Performed by: FAMILY MEDICINE

## 2023-07-25 PROCEDURE — G8400 PT W/DXA NO RESULTS DOC: HCPCS | Performed by: FAMILY MEDICINE

## 2023-07-25 PROCEDURE — 1090F PRES/ABSN URINE INCON ASSESS: CPT | Performed by: FAMILY MEDICINE

## 2023-07-25 PROCEDURE — 3075F SYST BP GE 130 - 139MM HG: CPT | Performed by: FAMILY MEDICINE

## 2023-07-25 PROCEDURE — G8427 DOCREV CUR MEDS BY ELIG CLIN: HCPCS | Performed by: FAMILY MEDICINE

## 2023-07-25 PROCEDURE — 99214 OFFICE O/P EST MOD 30 MIN: CPT | Performed by: FAMILY MEDICINE

## 2023-07-25 PROCEDURE — 1123F ACP DISCUSS/DSCN MKR DOCD: CPT | Performed by: FAMILY MEDICINE

## 2023-07-25 PROCEDURE — 3079F DIAST BP 80-89 MM HG: CPT | Performed by: FAMILY MEDICINE

## 2023-07-25 RX ORDER — PROPRANOLOL HCL 60 MG
60 CAPSULE, EXTENDED RELEASE 24HR ORAL DAILY
Qty: 90 CAPSULE | Refills: 3 | Status: SHIPPED | OUTPATIENT
Start: 2023-07-25 | End: 2024-07-24

## 2023-07-25 ASSESSMENT — PATIENT HEALTH QUESTIONNAIRE - PHQ9
SUM OF ALL RESPONSES TO PHQ QUESTIONS 1-9: 0
1. LITTLE INTEREST OR PLEASURE IN DOING THINGS: 0
2. FEELING DOWN, DEPRESSED OR HOPELESS: 0
SUM OF ALL RESPONSES TO PHQ QUESTIONS 1-9: 0
SUM OF ALL RESPONSES TO PHQ9 QUESTIONS 1 & 2: 0

## 2023-07-25 NOTE — PROGRESS NOTES
Patient here for hosp ED f/u left lobe pneumonia. Still coughing. 1. Have you been to the ER, urgent care clinic since your last visit? Hospitalized since your last visit? Yes see notes    2. Have you seen or consulted any other health care providers outside of the [address] [place] [address] Avenue since your last visit? Include any pap smears or colon screening.  No

## 2023-07-25 NOTE — PROGRESS NOTES
Patient here for hosp ED f/u left lobe pneumonia. Still coughing. 1. Have you been to the ER, urgent care clinic since your last visit? Hospitalized since your last visit? Yes see notes    2. Have you seen or consulted any other health care providers outside of the [address] [place] [address] since your last visit? Include any pap smears or colon screening. No        Chief Complaint   Patient presents with    Follow-Up from Hospital     pneumonia     She is a 68 y.o. female who presents for evalution. Reviewed PmHx, RxHx, FmHx, SocHx, AllgHx and updated and dated in the chart. CURRENT MEDS W/ ASSOC DIAG           Start Date End Date     albuterol sulfate HFA (VENTOLIN HFA) 108 (90 Base) MCG/ACT inhaler  07/01/23  --     Inhale 2 puffs into the lungs every 4 hours as needed for Wheezing or Shortness of Breath (Cough)     Associated Diagnoses:  --     azelastine (OPTIVAR) 0.05 % ophthalmic solution  07/12/22  --     Associated Diagnoses:  --     benztropine (COGENTIN) 1 MG tablet  01/23/23  --     Associated Diagnoses:  --     lisinopril (PRINIVIL;ZESTRIL) 20 MG tablet  06/22/23  --     TAKE 1 TABLET BY MOUTH EVERY DAY     Associated Diagnoses:  Essential (primary) hypertension     memantine ER (NAMENDA XR) 28 MG CP24 extended release capsule  03/07/23  --     Associated Diagnoses:  --     nystatin (MYCOSTATIN) 958315 UNIT/GM powder  11/28/18  --     Associated Diagnoses:  --     perphenazine 2 MG tablet  06/22/23  --     TAKE 1 TABLET BY MOUTH TWO TIMES A DAY. Associated Diagnoses:  --     polyethyl glycol-propyl glycol 0.4-0.3 % (SYSTANE) 0.4-0.3 % ophthalmic solution  --  --     Associated Diagnoses:  --     propranolol (INDERAL LA) 60 MG extended release capsule  07/25/23 07/24/24     Take 1 capsule by mouth daily     Associated Diagnoses:  Primary hypertension     rivastigmine (EXELON) 9.5 MG/24HR  06/22/23  --     APPLY ONE PATCH TO THE SKIN DAILY.      Associated Diagnoses:  Essential tremor

## 2023-07-26 ENCOUNTER — OFFICE VISIT (OUTPATIENT)
Age: 78
End: 2023-07-26
Payer: MEDICARE

## 2023-07-26 ENCOUNTER — TELEPHONE (OUTPATIENT)
Age: 78
End: 2023-07-26

## 2023-07-26 VITALS — SYSTOLIC BLOOD PRESSURE: 142 MMHG | HEART RATE: 73 BPM | OXYGEN SATURATION: 98 % | DIASTOLIC BLOOD PRESSURE: 88 MMHG

## 2023-07-26 DIAGNOSIS — G25.0 ESSENTIAL TREMOR: ICD-10-CM

## 2023-07-26 DIAGNOSIS — F03.918 DEMENTIA WITH BEHAVIORAL DISTURBANCE (HCC): Primary | ICD-10-CM

## 2023-07-26 PROCEDURE — G8428 CUR MEDS NOT DOCUMENT: HCPCS | Performed by: NURSE PRACTITIONER

## 2023-07-26 PROCEDURE — 3079F DIAST BP 80-89 MM HG: CPT | Performed by: NURSE PRACTITIONER

## 2023-07-26 PROCEDURE — G8400 PT W/DXA NO RESULTS DOC: HCPCS | Performed by: NURSE PRACTITIONER

## 2023-07-26 PROCEDURE — 1036F TOBACCO NON-USER: CPT | Performed by: NURSE PRACTITIONER

## 2023-07-26 PROCEDURE — 1090F PRES/ABSN URINE INCON ASSESS: CPT | Performed by: NURSE PRACTITIONER

## 2023-07-26 PROCEDURE — 3077F SYST BP >= 140 MM HG: CPT | Performed by: NURSE PRACTITIONER

## 2023-07-26 PROCEDURE — G8419 CALC BMI OUT NRM PARAM NOF/U: HCPCS | Performed by: NURSE PRACTITIONER

## 2023-07-26 PROCEDURE — 99215 OFFICE O/P EST HI 40 MIN: CPT | Performed by: NURSE PRACTITIONER

## 2023-07-26 PROCEDURE — 1123F ACP DISCUSS/DSCN MKR DOCD: CPT | Performed by: NURSE PRACTITIONER

## 2023-07-26 RX ORDER — MEMANTINE HYDROCHLORIDE 28 MG/1
CAPSULE, EXTENDED RELEASE ORAL
Qty: 90 CAPSULE | Refills: 1 | Status: SHIPPED | OUTPATIENT
Start: 2023-07-26

## 2023-07-26 RX ORDER — RIVASTIGMINE 9.5 MG/24H
PATCH, EXTENDED RELEASE TRANSDERMAL
Qty: 30 PATCH | Refills: 5 | Status: SHIPPED | OUTPATIENT
Start: 2023-07-26

## 2023-07-26 NOTE — PROGRESS NOTES
PCP picked up propranolol  Exelon patch needs PA  Dementia has had expected decline  Had pneumonia and pink eye in June  Propranolol does help with tremor
Value Date    LDLCALC 70 04/26/2022     Lab Results   Component Value Date    LABVLDL 27 04/26/2022     Lab Results   Component Value Date    CHOLHDLRATIO 2.9 04/26/2022       No results found for: SEDRATE    Hemoglobin A1C   Date Value Ref Range Status   11/28/2022 5.4 4.0 - 5.6 % Final     Comment:     NEW METHOD  PLEASE NOTE NEW REFERENCE RANGE  (NOTE)  HbA1C Interpretive Ranges  <5.7              Normal  5.7 - 6.4         Consider Prediabetes  >6.5              Consider Diabetes       No results found for: DILMA             1. Dementia with behavioral disturbance ([address])  2. Essential tremor  -     rivastigmine (EXELON) 9.5 MG/24HR; APPLY ONE PATCH TO THE SKIN DAILY. , Disp-30 patch, R-5Normal       Dementia seems to be progressing  She is not really staying very active so we will try to increase this to help her sister      She will continue Namenda extended release 28 mg  Restart the Exelon patch at 9.5 mg for dementia as she ran out of this and has noticed a big difference  Try to increase caloric intake  Stay active and follow-up 6 months  Call for physical therapy          This note will not be viewable in [employer]

## 2023-07-26 NOTE — TELEPHONE ENCOUNTER
PA submitted via CoverMyMeds   (Key: [id number])   Outcome- N/A    Please advise the [facility] [address].

## 2023-08-29 ENCOUNTER — CLINICAL DOCUMENTATION (OUTPATIENT)
Age: 78
End: 2023-08-29

## 2023-08-29 NOTE — PROGRESS NOTES
Home Care Delivered CMN for durable medical was put on [facility] CHRISTIAN desk to process

## 2023-09-06 ENCOUNTER — CLINICAL DOCUMENTATION (OUTPATIENT)
Age: 78
End: 2023-09-06

## 2023-09-06 NOTE — PROGRESS NOTES
Home Care Delivered CMN for durable medical was signed & faxed to [phone],ok,Copy placed in scan folder to be scanned to chart.

## 2023-10-30 ENCOUNTER — HOSPITAL ENCOUNTER (OUTPATIENT)
Facility: HOSPITAL | Age: 78
Discharge: HOME | End: 2023-11-02
Attending: FAMILY MEDICINE
Payer: MEDICARE

## 2023-10-30 DIAGNOSIS — Z12.31 SCREENING MAMMOGRAM FOR BREAST CANCER: ICD-10-CM

## 2023-10-30 PROCEDURE — 77063 BREAST TOMOSYNTHESIS BI: CPT

## 2023-11-18 DIAGNOSIS — E78.00 PURE HYPERCHOLESTEROLEMIA, UNSPECIFIED: ICD-10-CM

## 2023-11-20 RX ORDER — MEMANTINE HYDROCHLORIDE 28 MG/1
CAPSULE, EXTENDED RELEASE ORAL
Qty: 90 CAPSULE | Refills: 1 | Status: SHIPPED | OUTPATIENT
Start: 2023-11-20

## 2023-11-20 RX ORDER — SIMVASTATIN 40 MG
TABLET ORAL
Qty: 90 TABLET | Refills: 1 | Status: SHIPPED | OUTPATIENT
Start: 2023-11-20

## 2023-11-21 ENCOUNTER — OFFICE VISIT (OUTPATIENT)
Age: 78
End: 2023-11-21
Payer: MEDICARE

## 2023-11-21 VITALS
HEIGHT: 59 IN | HEART RATE: 73 BPM | BODY MASS INDEX: 27.01 KG/M2 | WEIGHT: 134 LBS | OXYGEN SATURATION: 98 % | TEMPERATURE: 97.9 F | DIASTOLIC BLOOD PRESSURE: 72 MMHG | RESPIRATION RATE: 14 BRPM | SYSTOLIC BLOOD PRESSURE: 139 MMHG

## 2023-11-21 DIAGNOSIS — F02.80 ALZHEIMER'S DISEASE (HCC): Primary | ICD-10-CM

## 2023-11-21 DIAGNOSIS — I10 PRIMARY HYPERTENSION: ICD-10-CM

## 2023-11-21 DIAGNOSIS — G30.9 ALZHEIMER'S DISEASE (HCC): Primary | ICD-10-CM

## 2023-11-21 DIAGNOSIS — H10.13 ACUTE ALLERGIC CONJUNCTIVITIS OF BOTH EYES: ICD-10-CM

## 2023-11-21 DIAGNOSIS — Z23 ENCOUNTER FOR IMMUNIZATION: ICD-10-CM

## 2023-11-21 PROCEDURE — 99214 OFFICE O/P EST MOD 30 MIN: CPT | Performed by: FAMILY MEDICINE

## 2023-11-21 PROCEDURE — 90694 VACC AIIV4 NO PRSRV 0.5ML IM: CPT | Performed by: FAMILY MEDICINE

## 2023-11-21 RX ORDER — AZELASTINE HYDROCHLORIDE 0.5 MG/ML
1 SOLUTION/ DROPS OPHTHALMIC 2 TIMES DAILY
Qty: 1 EACH | Refills: 3 | Status: SHIPPED | OUTPATIENT
Start: 2023-11-21 | End: 2023-12-21

## 2023-11-21 SDOH — ECONOMIC STABILITY: INCOME INSECURITY: HOW HARD IS IT FOR YOU TO PAY FOR THE VERY BASICS LIKE FOOD, HOUSING, MEDICAL CARE, AND HEATING?: NOT HARD AT ALL

## 2023-11-21 SDOH — ECONOMIC STABILITY: FOOD INSECURITY: WITHIN THE PAST 12 MONTHS, YOU WORRIED THAT YOUR FOOD WOULD RUN OUT BEFORE YOU GOT MONEY TO BUY MORE.: NEVER TRUE

## 2023-11-21 SDOH — ECONOMIC STABILITY: HOUSING INSECURITY
IN THE LAST 12 MONTHS, WAS THERE A TIME WHEN YOU DID NOT HAVE A STEADY PLACE TO SLEEP OR SLEPT IN A SHELTER (INCLUDING NOW)?: NO

## 2023-11-21 SDOH — ECONOMIC STABILITY: FOOD INSECURITY: WITHIN THE PAST 12 MONTHS, THE FOOD YOU BOUGHT JUST DIDN'T LAST AND YOU DIDN'T HAVE MONEY TO GET MORE.: NEVER TRUE

## 2023-11-21 NOTE — PROGRESS NOTES
Chief Complaint   Patient presents with    Dementia    Hypertension    Eye Problem     Right eye    Immunizations     Fluad     1. Have you been to the ER, urgent care clinic since your last visit? Hospitalized since your last visit? No    2. Have you seen or consulted any other health care providers outside of the [address] [place] [address] Avenue since your last visit? Include any pap smears or colon screening.  Yes Where: Neurology

## 2023-11-21 NOTE — PROGRESS NOTES
Chief Complaint   Patient presents with    Dementia    Hypertension    Eye Problem     Right eye    Immunizations     Fluad     1. Have you been to the ER, urgent care clinic since your last visit? Hospitalized since your last visit? No    2. Have you seen or consulted any other health care providers outside of the [address] [place] [address] since your last visit? Include any pap smears or colon screening. Yes Where: Neurology    Chief Complaint   Patient presents with    Dementia    Hypertension    Eye Problem     Right eye    Immunizations     Fluad     She is a 66 y.o. female who presents for evalution. Reviewed PmHx, RxHx, FmHx, SocHx, AllgHx and updated and dated in the chart. CURRENT MEDS W/ ASSOC DIAG           Start Date End Date     albuterol sulfate HFA (VENTOLIN HFA) 108 (90 Base) MCG/ACT inhaler  07/01/23  --     Inhale 2 puffs into the lungs every 4 hours as needed for Wheezing or Shortness of Breath (Cough)     Associated Diagnoses:  --     azelastine (OPTIVAR) 0.05 % ophthalmic solution  07/12/22  --     Associated Diagnoses:  --     azelastine (OPTIVAR) 0.05 % ophthalmic solution  11/21/23 12/21/23     Place 1 drop into both eyes 2 times daily     Associated Diagnoses:  Acute allergic conjunctivitis of both eyes     lisinopril (PRINIVIL;ZESTRIL) 20 MG tablet  06/22/23  --     TAKE 1 TABLET BY MOUTH EVERY DAY     Associated Diagnoses:  Essential (primary) hypertension     memantine ER (NAMENDA XR) 28 MG CP24 extended release capsule  11/20/23  --     TAKE 1 CAP BY MOUTH DAILY. FOR MODERATE TO SEVERE ALZHEIMER'S TYPE DEMENTIA     Associated Diagnoses:  --     nystatin (MYCOSTATIN) 348679 UNIT/GM powder  11/28/18  --     Associated Diagnoses:  --     perphenazine 2 MG tablet  06/22/23  --     TAKE 1 TABLET BY MOUTH TWO TIMES A DAY.      Associated Diagnoses:  --     polyethyl glycol-propyl glycol 0.4-0.3 % (SYSTANE) 0.4-0.3 % ophthalmic solution  --  --     Associated Diagnoses:  --

## 2023-12-01 DIAGNOSIS — H10.13 ACUTE ALLERGIC CONJUNCTIVITIS OF BOTH EYES: ICD-10-CM

## 2023-12-04 RX ORDER — AZELASTINE HYDROCHLORIDE 0.5 MG/ML
1 SOLUTION/ DROPS OPHTHALMIC 2 TIMES DAILY
Qty: 18 ML | Refills: 1 | Status: SHIPPED | OUTPATIENT
Start: 2023-12-04

## 2024-01-24 ENCOUNTER — OFFICE VISIT (OUTPATIENT)
Age: 79
End: 2024-01-24
Payer: MEDICARE

## 2024-01-24 VITALS — DIASTOLIC BLOOD PRESSURE: 78 MMHG | SYSTOLIC BLOOD PRESSURE: 120 MMHG

## 2024-01-24 DIAGNOSIS — F03.918 DEMENTIA WITH BEHAVIORAL DISTURBANCE (HCC): Primary | ICD-10-CM

## 2024-01-24 PROCEDURE — G8484 FLU IMMUNIZE NO ADMIN: HCPCS | Performed by: NURSE PRACTITIONER

## 2024-01-24 PROCEDURE — 3078F DIAST BP <80 MM HG: CPT | Performed by: NURSE PRACTITIONER

## 2024-01-24 PROCEDURE — 99215 OFFICE O/P EST HI 40 MIN: CPT | Performed by: NURSE PRACTITIONER

## 2024-01-24 PROCEDURE — 3074F SYST BP LT 130 MM HG: CPT | Performed by: NURSE PRACTITIONER

## 2024-01-24 PROCEDURE — G8428 CUR MEDS NOT DOCUMENT: HCPCS | Performed by: NURSE PRACTITIONER

## 2024-01-24 PROCEDURE — G8400 PT W/DXA NO RESULTS DOC: HCPCS | Performed by: NURSE PRACTITIONER

## 2024-01-24 PROCEDURE — G8419 CALC BMI OUT NRM PARAM NOF/U: HCPCS | Performed by: NURSE PRACTITIONER

## 2024-01-24 PROCEDURE — 1036F TOBACCO NON-USER: CPT | Performed by: NURSE PRACTITIONER

## 2024-01-24 PROCEDURE — 1090F PRES/ABSN URINE INCON ASSESS: CPT | Performed by: NURSE PRACTITIONER

## 2024-01-24 PROCEDURE — 1123F ACP DISCUSS/DSCN MKR DOCD: CPT | Performed by: NURSE PRACTITIONER

## 2024-01-25 NOTE — PROGRESS NOTES
Since LOV has had slight decline  Dependent entirely  Said when dark and dreary is a little more agitated and depressed     
- 6.4         Consider Prediabetes  >6.5              Consider Diabetes       No results found for: \"DILMA\"         Continued dementia progression  She is currently on Namenda extended release 28 mg  She is also on Exelon patch 9.5 mg continue these for preservation of memory  We did discuss she does need to be more active as she is really not doing much to keep herself engaged    We discussed physical therapy and Occupational Therapy to come into the home but she does not want to do this  We discussed certain chores that she can do around the house to help and she is okay with trying this  She is being well looked after by her sister who does take her pretty much everywhere she goes also but be more active or else things will continue to get worse          This note will not be viewable in MyChart

## 2024-02-17 DIAGNOSIS — G25.0 ESSENTIAL TREMOR: ICD-10-CM

## 2024-02-18 DIAGNOSIS — G25.0 ESSENTIAL TREMOR: ICD-10-CM

## 2024-02-18 RX ORDER — RIVASTIGMINE 9.5 MG/24H
PATCH, EXTENDED RELEASE TRANSDERMAL
Qty: 30 PATCH | Refills: 5 | Status: SHIPPED | OUTPATIENT
Start: 2024-02-18

## 2024-02-18 RX ORDER — RIVASTIGMINE 9.5 MG/24H
PATCH, EXTENDED RELEASE TRANSDERMAL
Qty: 90 PATCH | Refills: 1 | OUTPATIENT
Start: 2024-02-18

## 2024-02-19 RX ORDER — PERPHENAZINE 2 MG/1
2 TABLET ORAL 2 TIMES DAILY
Qty: 180 TABLET | Refills: 1 | Status: SHIPPED | OUTPATIENT
Start: 2024-02-19

## 2024-03-19 DIAGNOSIS — G43.709 CHRONIC MIGRAINE WITHOUT AURA WITHOUT STATUS MIGRAINOSUS, NOT INTRACTABLE: Primary | ICD-10-CM

## 2024-03-19 RX ORDER — MEMANTINE HYDROCHLORIDE 28 MG/1
CAPSULE, EXTENDED RELEASE ORAL
Qty: 90 CAPSULE | Refills: 1 | OUTPATIENT
Start: 2024-03-19

## 2024-04-24 ENCOUNTER — TELEPHONE (OUTPATIENT)
Age: 79
End: 2024-04-24

## 2024-04-24 NOTE — TELEPHONE ENCOUNTER
We received a fax refill request for Edel Goel.  Please escribe azelastine (OPTIVAR) 0.05 % ophthalmic solution  to their pharmacy.  The pharmacy is correct in the chart and they are requesting a ? day supply.

## 2024-04-25 RX ORDER — AZELASTINE HYDROCHLORIDE 0.5 MG/ML
1 SOLUTION/ DROPS OPHTHALMIC 2 TIMES DAILY
Qty: 1 EACH | Refills: 5 | Status: SHIPPED | OUTPATIENT
Start: 2024-04-25

## 2024-05-21 DIAGNOSIS — E78.00 PURE HYPERCHOLESTEROLEMIA, UNSPECIFIED: ICD-10-CM

## 2024-05-21 RX ORDER — SIMVASTATIN 40 MG
TABLET ORAL
Qty: 90 TABLET | Refills: 1 | Status: SHIPPED | OUTPATIENT
Start: 2024-05-21

## 2024-06-17 ENCOUNTER — CLINICAL DOCUMENTATION (OUTPATIENT)
Age: 79
End: 2024-06-17

## 2024-06-18 ENCOUNTER — CLINICAL DOCUMENTATION (OUTPATIENT)
Age: 79
End: 2024-06-18

## 2024-06-18 NOTE — PROGRESS NOTES
[facility] CMN was signed & faxed to [phone],ok,Copy placed in scan folder to be scanned to chart.

## 2024-07-15 ENCOUNTER — CLINICAL DOCUMENTATION (OUTPATIENT)
Age: 79
End: 2024-07-15

## 2024-07-16 ENCOUNTER — CLINICAL DOCUMENTATION (OUTPATIENT)
Age: 79
End: 2024-07-16

## 2024-07-16 NOTE — PROGRESS NOTES
[facility] CMN was signed & faxed to [phone],ok,Copy placed in scan folder to be scanned to chart.

## 2024-07-30 DIAGNOSIS — G43.709 CHRONIC MIGRAINE WITHOUT AURA WITHOUT STATUS MIGRAINOSUS, NOT INTRACTABLE: ICD-10-CM

## 2024-07-30 DIAGNOSIS — I10 ESSENTIAL (PRIMARY) HYPERTENSION: ICD-10-CM

## 2024-07-30 DIAGNOSIS — G25.0 ESSENTIAL TREMOR: ICD-10-CM

## 2024-07-30 RX ORDER — PROPRANOLOL HCL 60 MG
CAPSULE, EXTENDED RELEASE 24HR ORAL DAILY
Qty: 90 CAPSULE | Refills: 0 | Status: SHIPPED | OUTPATIENT
Start: 2024-07-30

## 2024-07-30 RX ORDER — LISINOPRIL 20 MG/1
TABLET ORAL
Qty: 90 TABLET | Refills: 3 | Status: SHIPPED | OUTPATIENT
Start: 2024-07-30

## 2024-07-31 RX ORDER — MEMANTINE HYDROCHLORIDE 28 MG/1
CAPSULE, EXTENDED RELEASE ORAL
Qty: 90 CAPSULE | Refills: 1 | Status: SHIPPED | OUTPATIENT
Start: 2024-07-31 | End: 2024-09-24 | Stop reason: SDUPTHER

## 2024-08-01 RX ORDER — PERPHENAZINE 2 MG/1
2 TABLET ORAL 2 TIMES DAILY
Qty: 180 TABLET | Refills: 1 | Status: SHIPPED | OUTPATIENT
Start: 2024-08-01

## 2024-08-05 RX ORDER — RIVASTIGMINE 9.5 MG/24H
PATCH, EXTENDED RELEASE TRANSDERMAL
Qty: 30 PATCH | Refills: 5 | Status: SHIPPED | OUTPATIENT
Start: 2024-08-05 | End: 2024-09-24 | Stop reason: SDUPTHER

## 2024-09-24 ENCOUNTER — OFFICE VISIT (OUTPATIENT)
Age: 79
End: 2024-09-24
Payer: MEDICARE

## 2024-09-24 VITALS
HEART RATE: 72 BPM | DIASTOLIC BLOOD PRESSURE: 76 MMHG | TEMPERATURE: 97.7 F | RESPIRATION RATE: 20 BRPM | OXYGEN SATURATION: 96 % | SYSTOLIC BLOOD PRESSURE: 126 MMHG

## 2024-09-24 DIAGNOSIS — G62.9 NEUROPATHY: ICD-10-CM

## 2024-09-24 DIAGNOSIS — G43.709 CHRONIC MIGRAINE WITHOUT AURA WITHOUT STATUS MIGRAINOSUS, NOT INTRACTABLE: ICD-10-CM

## 2024-09-24 DIAGNOSIS — R26.9 GAIT ABNORMALITY: Primary | ICD-10-CM

## 2024-09-24 DIAGNOSIS — G25.0 ESSENTIAL TREMOR: ICD-10-CM

## 2024-09-24 PROCEDURE — 1123F ACP DISCUSS/DSCN MKR DOCD: CPT | Performed by: NURSE PRACTITIONER

## 2024-09-24 PROCEDURE — 3074F SYST BP LT 130 MM HG: CPT | Performed by: NURSE PRACTITIONER

## 2024-09-24 PROCEDURE — G8400 PT W/DXA NO RESULTS DOC: HCPCS | Performed by: NURSE PRACTITIONER

## 2024-09-24 PROCEDURE — 3078F DIAST BP <80 MM HG: CPT | Performed by: NURSE PRACTITIONER

## 2024-09-24 PROCEDURE — G8419 CALC BMI OUT NRM PARAM NOF/U: HCPCS | Performed by: NURSE PRACTITIONER

## 2024-09-24 PROCEDURE — 1036F TOBACCO NON-USER: CPT | Performed by: NURSE PRACTITIONER

## 2024-09-24 PROCEDURE — 99215 OFFICE O/P EST HI 40 MIN: CPT | Performed by: NURSE PRACTITIONER

## 2024-09-24 PROCEDURE — G8427 DOCREV CUR MEDS BY ELIG CLIN: HCPCS | Performed by: NURSE PRACTITIONER

## 2024-09-24 PROCEDURE — 1090F PRES/ABSN URINE INCON ASSESS: CPT | Performed by: NURSE PRACTITIONER

## 2024-09-24 RX ORDER — MEMANTINE HYDROCHLORIDE 28 MG/1
CAPSULE, EXTENDED RELEASE ORAL
Qty: 90 CAPSULE | Refills: 1 | Status: SHIPPED | OUTPATIENT
Start: 2024-09-24

## 2024-09-24 RX ORDER — RIVASTIGMINE 9.5 MG/24H
PATCH, EXTENDED RELEASE TRANSDERMAL
Qty: 90 PATCH | Refills: 1 | Status: SHIPPED | OUTPATIENT
Start: 2024-09-24

## 2024-10-08 ENCOUNTER — OFFICE VISIT (OUTPATIENT)
Age: 79
End: 2024-10-08
Payer: MEDICARE

## 2024-10-08 VITALS
SYSTOLIC BLOOD PRESSURE: 150 MMHG | DIASTOLIC BLOOD PRESSURE: 85 MMHG | HEART RATE: 59 BPM | BODY MASS INDEX: 26.81 KG/M2 | OXYGEN SATURATION: 97 % | RESPIRATION RATE: 16 BRPM | TEMPERATURE: 97.6 F | WEIGHT: 133 LBS | HEIGHT: 59 IN

## 2024-10-08 DIAGNOSIS — R03.0 ELEVATED BLOOD PRESSURE READING: ICD-10-CM

## 2024-10-08 DIAGNOSIS — M25.562 CHRONIC PAIN OF BOTH KNEES: ICD-10-CM

## 2024-10-08 DIAGNOSIS — M25.561 CHRONIC PAIN OF BOTH KNEES: ICD-10-CM

## 2024-10-08 DIAGNOSIS — E78.00 HIGH CHOLESTEROL: ICD-10-CM

## 2024-10-08 DIAGNOSIS — Z00.01 ENCOUNTER FOR MEDICARE ANNUAL EXAMINATION WITH ABNORMAL FINDINGS: Primary | ICD-10-CM

## 2024-10-08 DIAGNOSIS — G89.29 CHRONIC PAIN OF BOTH KNEES: ICD-10-CM

## 2024-10-08 DIAGNOSIS — F20.5 RESIDUAL SCHIZOPHRENIA (HCC): ICD-10-CM

## 2024-10-08 DIAGNOSIS — N18.31 CHRONIC KIDNEY DISEASE, STAGE 3A (HCC): ICD-10-CM

## 2024-10-08 DIAGNOSIS — I10 PRIMARY HYPERTENSION: ICD-10-CM

## 2024-10-08 DIAGNOSIS — R73.9 ELEVATED BLOOD SUGAR: ICD-10-CM

## 2024-10-08 DIAGNOSIS — N18.31 STAGE 3A CHRONIC KIDNEY DISEASE (HCC): ICD-10-CM

## 2024-10-08 DIAGNOSIS — F02.80 ALZHEIMER'S DISEASE (HCC): ICD-10-CM

## 2024-10-08 DIAGNOSIS — G30.9 ALZHEIMER'S DISEASE (HCC): ICD-10-CM

## 2024-10-08 LAB
ALBUMIN SERPL-MCNC: 3.6 G/DL (ref 3.5–5)
ALBUMIN/GLOB SERPL: 1.1 (ref 1.1–2.2)
ALP SERPL-CCNC: 136 U/L (ref 45–117)
ALT SERPL-CCNC: 16 U/L (ref 12–78)
ANION GAP SERPL CALC-SCNC: 4 MMOL/L (ref 2–12)
AST SERPL-CCNC: 19 U/L (ref 15–37)
BASOPHILS # BLD: 0.1 K/UL (ref 0–0.1)
BASOPHILS NFR BLD: 1 % (ref 0–1)
BILIRUB SERPL-MCNC: 0.4 MG/DL (ref 0.2–1)
BUN SERPL-MCNC: 18 MG/DL (ref 6–20)
BUN/CREAT SERPL: 19 (ref 12–20)
CALCIUM SERPL-MCNC: 9.7 MG/DL (ref 8.5–10.1)
CHLORIDE SERPL-SCNC: 104 MMOL/L (ref 97–108)
CHOLEST SERPL-MCNC: 170 MG/DL
CO2 SERPL-SCNC: 27 MMOL/L (ref 21–32)
CREAT SERPL-MCNC: 0.95 MG/DL (ref 0.55–1.02)
DIFFERENTIAL METHOD BLD: ABNORMAL
EOSINOPHIL # BLD: 0.3 K/UL (ref 0–0.4)
EOSINOPHIL NFR BLD: 3 % (ref 0–7)
ERYTHROCYTE [DISTWIDTH] IN BLOOD BY AUTOMATED COUNT: 13.2 % (ref 11.5–14.5)
EST. AVERAGE GLUCOSE BLD GHB EST-MCNC: 114 MG/DL
GLOBULIN SER CALC-MCNC: 3.3 G/DL (ref 2–4)
GLUCOSE SERPL-MCNC: 111 MG/DL (ref 65–100)
HBA1C MFR BLD: 5.6 % (ref 4–5.6)
HCT VFR BLD AUTO: 36.1 % (ref 35–47)
HDLC SERPL-MCNC: 53 MG/DL
HDLC SERPL: 3.2 (ref 0–5)
HGB BLD-MCNC: 11.3 G/DL (ref 11.5–16)
IMM GRANULOCYTES # BLD AUTO: 0 K/UL (ref 0–0.04)
IMM GRANULOCYTES NFR BLD AUTO: 0 % (ref 0–0.5)
LDLC SERPL CALC-MCNC: 83.8 MG/DL (ref 0–100)
LYMPHOCYTES # BLD: 1.5 K/UL (ref 0.8–3.5)
LYMPHOCYTES NFR BLD: 16 % (ref 12–49)
MCH RBC QN AUTO: 28.5 PG (ref 26–34)
MCHC RBC AUTO-ENTMCNC: 31.3 G/DL (ref 30–36.5)
MCV RBC AUTO: 90.9 FL (ref 80–99)
MONOCYTES # BLD: 1 K/UL (ref 0–1)
MONOCYTES NFR BLD: 10 % (ref 5–13)
NEUTS SEG # BLD: 6.8 K/UL (ref 1.8–8)
NEUTS SEG NFR BLD: 70 % (ref 32–75)
NRBC # BLD: 0 K/UL (ref 0–0.01)
NRBC BLD-RTO: 0 PER 100 WBC
PLATELET # BLD AUTO: 364 K/UL (ref 150–400)
PMV BLD AUTO: 10.1 FL (ref 8.9–12.9)
POTASSIUM SERPL-SCNC: 4.8 MMOL/L (ref 3.5–5.1)
PROT SERPL-MCNC: 6.9 G/DL (ref 6.4–8.2)
RBC # BLD AUTO: 3.97 M/UL (ref 3.8–5.2)
SODIUM SERPL-SCNC: 135 MMOL/L (ref 136–145)
TRIGL SERPL-MCNC: 166 MG/DL
TSH SERPL DL<=0.05 MIU/L-ACNC: 1.23 UIU/ML (ref 0.36–3.74)
VLDLC SERPL CALC-MCNC: 33.2 MG/DL
WBC # BLD AUTO: 9.7 K/UL (ref 3.6–11)

## 2024-10-08 PROCEDURE — 90653 IIV ADJUVANT VACCINE IM: CPT | Performed by: FAMILY MEDICINE

## 2024-10-08 PROCEDURE — 99214 OFFICE O/P EST MOD 30 MIN: CPT | Performed by: FAMILY MEDICINE

## 2024-10-08 RX ORDER — CETIRIZINE HYDROCHLORIDE 10 MG/1
10 TABLET ORAL DAILY
COMMUNITY

## 2024-10-08 RX ORDER — LISINOPRIL AND HYDROCHLOROTHIAZIDE 12.5; 2 MG/1; MG/1
1 TABLET ORAL DAILY
Qty: 90 TABLET | Refills: 1 | Status: SHIPPED | OUTPATIENT
Start: 2024-10-08

## 2024-10-08 SDOH — ECONOMIC STABILITY: FOOD INSECURITY: WITHIN THE PAST 12 MONTHS, YOU WORRIED THAT YOUR FOOD WOULD RUN OUT BEFORE YOU GOT MONEY TO BUY MORE.: NEVER TRUE

## 2024-10-08 SDOH — ECONOMIC STABILITY: FOOD INSECURITY: WITHIN THE PAST 12 MONTHS, THE FOOD YOU BOUGHT JUST DIDN'T LAST AND YOU DIDN'T HAVE MONEY TO GET MORE.: NEVER TRUE

## 2024-10-08 SDOH — ECONOMIC STABILITY: INCOME INSECURITY: HOW HARD IS IT FOR YOU TO PAY FOR THE VERY BASICS LIKE FOOD, HOUSING, MEDICAL CARE, AND HEATING?: NOT HARD AT ALL

## 2024-10-08 ASSESSMENT — LIFESTYLE VARIABLES
HOW OFTEN DO YOU HAVE A DRINK CONTAINING ALCOHOL: NEVER
HOW MANY STANDARD DRINKS CONTAINING ALCOHOL DO YOU HAVE ON A TYPICAL DAY: PATIENT DOES NOT DRINK

## 2024-10-08 ASSESSMENT — PATIENT HEALTH QUESTIONNAIRE - PHQ9
SUM OF ALL RESPONSES TO PHQ QUESTIONS 1-9: 0
SUM OF ALL RESPONSES TO PHQ9 QUESTIONS 1 & 2: 0
SUM OF ALL RESPONSES TO PHQ QUESTIONS 1-9: 0
1. LITTLE INTEREST OR PLEASURE IN DOING THINGS: NOT AT ALL
SUM OF ALL RESPONSES TO PHQ QUESTIONS 1-9: 0
SUM OF ALL RESPONSES TO PHQ QUESTIONS 1-9: 0
2. FEELING DOWN, DEPRESSED OR HOPELESS: NOT AT ALL

## 2024-10-08 NOTE — PATIENT INSTRUCTIONS
take.  How can you care for yourself at home?  Diet    Use less salt when you cook and eat. This helps lower your blood pressure. Taste food before salting. Add only a little salt when you think you need it. With time, your taste buds will adjust to less salt.     Eat fewer snack items, fast foods, canned soups, and other high-salt, high-fat, processed foods.     Read food labels and try to avoid saturated and trans fats. They increase your risk of heart disease by raising cholesterol levels.     Limit the amount of solid fat--butter, margarine, and shortening--you eat. Use olive, peanut, or canola oil when you cook. Bake, broil, and steam foods instead of frying them.     Eat a variety of fruit and vegetables every day. Dark green, deep orange, red, or yellow fruits and vegetables are especially good for you. Examples include spinach, carrots, peaches, and berries.     Foods high in fiber can reduce your cholesterol and provide important vitamins and minerals. High-fiber foods include whole-grain cereals and breads, oatmeal, beans, brown rice, citrus fruits, and apples.     Eat lean proteins. Heart-healthy proteins include seafood, lean meats and poultry, eggs, beans, peas, nuts, seeds, and soy products.     Limit drinks and foods with added sugar. These include candy, desserts, and soda pop.   Heart-healthy lifestyle    If your doctor recommends it, get more exercise. For many people, walking is a good choice. Or you may want to swim, bike, or do other activities. Bit by bit, increase the time you're active every day. Try for at least 30 minutes on most days of the week.     Try to quit or cut back on using tobacco and other nicotine products. This includes smoking and vaping. If you need help quitting, talk to your doctor about stop-smoking programs and medicines. These can increase your chances of quitting for good. Quitting is one of the most important things you can do to protect your heart. It is never too

## 2024-10-08 NOTE — PROGRESS NOTES
Chief Complaint   Patient presents with    Medicare AWV     Patient presents in office today for AMW visit and fasting labs.  Has c/o elevated BP.  No other concerns.      \"Have you been to the ER, urgent care clinic since your last visit?  Hospitalized since your last visit?\"    NO    “Have you seen or consulted any other health care providers outside our system since your last visit?”    NO                 Edel Goel (:  1945) is a 78 y.o. female, here for evaluation of the following chief complaint(s):  Medicare AWV      Subjective   History of Present Illness  The patient presents for an annual wellness visit. She is accompanied by an adult female.    She has been experiencing elevated blood pressure, which was recorded as 175/86 using an arm cuff. Her physical therapist also noted high readings during three separate checks. Despite being in a relaxed state at home, her blood pressure remains high. Today's reading, taken by the nurse, showed a systolic pressure of 150 on one side and 170 on the other. She is currently on lisinopril for blood pressure management. However, she is reluctant to take diuretics due to difficulties with morning bathroom visits. Her physical therapist has expressed concern about her high blood pressure.    She tends to feel anxious when visiting the hospital.    Objective   Blood pressure (!) 150/85, pulse 59, temperature 97.6 °F (36.4 °C), temperature source Oral, resp. rate 16, height 1.499 m (4' 11\"), weight 60.3 kg (133 lb), SpO2 97%.  Physical Exam            Assessment & Plan  1. Hypertension.  Her blood pressure readings have been inconsistent, with a high of 175/86 while relaxed and 150/170 taken by the nurse today. The current medication, lisinopril, will be discontinued and replaced with a combination of lisinopril and a low-dose diuretic to better manage her blood pressure. It was explained that this diuretic will not cause frequent urination. The goal is to maintain

## 2024-10-08 NOTE — PROGRESS NOTES
Chief Complaint   Patient presents with    Medicare AWV     Patient presents in office today for AMW visit and fasting labs.  Has c/o elevated BP.  She needs an order for a Rolator.   No other concerns.    Patient would benefit from a rollator walker due to lower extremity weakness from degenerative joint disease. Patient would like to be more independent going grocery shopping, medical appointments and outside to walk in neighborhood or park to increase health benefits, but gets tired easily and has no way to stop and sit to rest. A rollator walker with a seat would provide the health benefits required, for safety and independence.        \"Have you been to the ER, urgent care clinic since your last visit?  Hospitalized since your last visit?\"    NO    “Have you seen or consulted any other health care providers outside our system since your last visit?”    NO

## 2024-10-09 ENCOUNTER — TELEPHONE (OUTPATIENT)
Age: 79
End: 2024-10-09

## 2024-10-09 NOTE — TELEPHONE ENCOUNTER
Placed order for Rolator on parachute through [employer]. [facility] denied the order stating that she received a wheel chair through them on 11/30/22 and due to the order being similar insurance will not cover. They also advised that since patient's secondary insurance is medicaid, they can not offer OOP.       Called and spoke with patient's niece Tasneem. Advised of [facility] denying the order due to getting a wheelchair two years ago. Advised that they would have to pay OOP for a Rolator. Tasneem verbalized understanding and asked if they should order off [employer]. Advised that is an option of they could check with [facility] pharmacy. Tasneem states she understood.

## 2024-10-21 ENCOUNTER — TRANSCRIBE ORDERS (OUTPATIENT)
Facility: HOSPITAL | Age: 79
End: 2024-10-21

## 2024-10-21 DIAGNOSIS — Z12.31 VISIT FOR SCREENING MAMMOGRAM: Primary | ICD-10-CM

## 2024-10-23 RX ORDER — PERPHENAZINE 2 MG/1
2 TABLET ORAL 2 TIMES DAILY
Qty: 180 TABLET | Refills: 1 | Status: SHIPPED | OUTPATIENT
Start: 2024-10-23

## 2024-10-31 ENCOUNTER — HOSPITAL ENCOUNTER (OUTPATIENT)
Facility: HOSPITAL | Age: 79
Discharge: HOME | End: 2024-11-03
Attending: FAMILY MEDICINE
Payer: MEDICARE

## 2024-10-31 VITALS — WEIGHT: 133 LBS | BODY MASS INDEX: 26.81 KG/M2 | HEIGHT: 59 IN

## 2024-10-31 DIAGNOSIS — Z12.31 VISIT FOR SCREENING MAMMOGRAM: ICD-10-CM

## 2024-10-31 PROCEDURE — 77067 SCR MAMMO BI INCL CAD: CPT

## 2024-11-11 RX ORDER — PROPRANOLOL HYDROCHLORIDE 60 MG/1
CAPSULE, EXTENDED RELEASE ORAL DAILY
Qty: 90 CAPSULE | Refills: 0 | Status: SHIPPED | OUTPATIENT
Start: 2024-11-11

## 2024-11-18 ENCOUNTER — OFFICE VISIT (OUTPATIENT)
Facility: CLINIC | Age: 79
End: 2024-11-18
Payer: MEDICARE

## 2024-11-18 VITALS
TEMPERATURE: 97.9 F | SYSTOLIC BLOOD PRESSURE: 170 MMHG | HEIGHT: 59 IN | DIASTOLIC BLOOD PRESSURE: 89 MMHG | RESPIRATION RATE: 20 BRPM | HEART RATE: 56 BPM | BODY MASS INDEX: 27.01 KG/M2 | OXYGEN SATURATION: 96 % | WEIGHT: 134 LBS

## 2024-11-18 DIAGNOSIS — R15.9 FULL INCONTINENCE OF FECES: ICD-10-CM

## 2024-11-18 DIAGNOSIS — R03.0 ELEVATED BLOOD PRESSURE READING: ICD-10-CM

## 2024-11-18 DIAGNOSIS — I10 PRIMARY HYPERTENSION: Primary | ICD-10-CM

## 2024-11-18 PROCEDURE — 1160F RVW MEDS BY RX/DR IN RCRD: CPT | Performed by: FAMILY MEDICINE

## 2024-11-18 PROCEDURE — 1126F AMNT PAIN NOTED NONE PRSNT: CPT | Performed by: FAMILY MEDICINE

## 2024-11-18 PROCEDURE — 1123F ACP DISCUSS/DSCN MKR DOCD: CPT | Performed by: FAMILY MEDICINE

## 2024-11-18 PROCEDURE — G8400 PT W/DXA NO RESULTS DOC: HCPCS | Performed by: FAMILY MEDICINE

## 2024-11-18 PROCEDURE — 99213 OFFICE O/P EST LOW 20 MIN: CPT | Performed by: FAMILY MEDICINE

## 2024-11-18 PROCEDURE — 1159F MED LIST DOCD IN RCRD: CPT | Performed by: FAMILY MEDICINE

## 2024-11-18 PROCEDURE — 1036F TOBACCO NON-USER: CPT | Performed by: FAMILY MEDICINE

## 2024-11-18 PROCEDURE — G8482 FLU IMMUNIZE ORDER/ADMIN: HCPCS | Performed by: FAMILY MEDICINE

## 2024-11-18 PROCEDURE — 1090F PRES/ABSN URINE INCON ASSESS: CPT | Performed by: FAMILY MEDICINE

## 2024-11-18 PROCEDURE — 3079F DIAST BP 80-89 MM HG: CPT | Performed by: FAMILY MEDICINE

## 2024-11-18 PROCEDURE — 3077F SYST BP >= 140 MM HG: CPT | Performed by: FAMILY MEDICINE

## 2024-11-18 PROCEDURE — G8419 CALC BMI OUT NRM PARAM NOF/U: HCPCS | Performed by: FAMILY MEDICINE

## 2024-11-18 PROCEDURE — G8427 DOCREV CUR MEDS BY ELIG CLIN: HCPCS | Performed by: FAMILY MEDICINE

## 2024-11-18 RX ORDER — AMLODIPINE BESYLATE 5 MG/1
5 TABLET ORAL DAILY
Qty: 30 TABLET | Refills: 5 | Status: SHIPPED | OUTPATIENT
Start: 2024-11-18

## 2024-11-18 ASSESSMENT — PATIENT HEALTH QUESTIONNAIRE - PHQ9
SUM OF ALL RESPONSES TO PHQ QUESTIONS 1-9: 0
SUM OF ALL RESPONSES TO PHQ QUESTIONS 1-9: 0
2. FEELING DOWN, DEPRESSED OR HOPELESS: NOT AT ALL
SUM OF ALL RESPONSES TO PHQ9 QUESTIONS 1 & 2: 0
1. LITTLE INTEREST OR PLEASURE IN DOING THINGS: NOT AT ALL
SUM OF ALL RESPONSES TO PHQ QUESTIONS 1-9: 0
SUM OF ALL RESPONSES TO PHQ QUESTIONS 1-9: 0

## 2024-11-18 NOTE — PROGRESS NOTES
Patient here for htn. Frequently, bp's elevated at home. Patient needs a letter to [facility] and [employer] Services ([employer]AS) to increase personal care. They have reduces care hours from 112 hours to 80 hours every 2 weeks. Patient has frequent incontinence with bladder and bowels and needs frequent diaper changes sometimes 9-10 diapers per day. Sister who lives with her is elderly and is becoming difficult for her to help out. Patient needs Medium adult diapers and disposable chux for bed.        Please reconsider approving patient's personal aid hours due to patient's inability to care for herself. Due to her dementia/ Alzeimers diagnosis, and DJD causes her balance difficulties. Patient relies on her elderly sister that lives with her , who had a stroke and is having difficulty doing all the adl's and incontinence changes that is required  ( roughly 8-10 changes per day) for patient. She needs help in caring for patient to help with ADL's and to prevent falls and to keep both caregiver and patient safe. Her caregiver, provides meals, ( breakfast and lunch) and assists her with her walker, down to dinner in the dining room of their  independent living apartment where patient can socialize with other residence. Patient ambulates with a walker but is also unsteady and needs continuous guidance.     \"Have you been to the ER, urgent care clinic since your last visit?  Hospitalized since your last visit?\"    NO    “Have you seen or consulted any other health care providers outside our system since your last visit?”    NO             
visit.

## 2024-12-15 DIAGNOSIS — E78.00 PURE HYPERCHOLESTEROLEMIA, UNSPECIFIED: ICD-10-CM

## 2024-12-16 ENCOUNTER — TELEPHONE (OUTPATIENT)
Age: 79
End: 2024-12-16

## 2024-12-16 NOTE — TELEPHONE ENCOUNTER
RE:Rivastigmine  Key: [id number]    Requested medication has been approved:    RIVASTIGMINE Patch 24HR Type of coverage approved: Non-Formulary This approval authorizes your coverage from 01/01/2024 - 12/16/2025, unless we notify you otherwise, and as long as the following conditions apply: ? you remain enrolled in our Medicare Part D prescription drug plan, ? your physician or other prescriber continues to prescribe the medication for you, and ? the medication continues to be safe for treating your condition.    Letter in media  Nurse notified

## 2024-12-18 DIAGNOSIS — E78.00 PURE HYPERCHOLESTEROLEMIA, UNSPECIFIED: ICD-10-CM

## 2024-12-18 RX ORDER — SIMVASTATIN 40 MG
TABLET ORAL
Qty: 90 TABLET | Refills: 1 | OUTPATIENT
Start: 2024-12-18

## 2024-12-18 RX ORDER — PROPRANOLOL HYDROCHLORIDE 60 MG/1
CAPSULE, EXTENDED RELEASE ORAL DAILY
Qty: 90 CAPSULE | Refills: 0 | OUTPATIENT
Start: 2024-12-18

## 2024-12-18 RX ORDER — SIMVASTATIN 40 MG
40 TABLET ORAL NIGHTLY
Qty: 90 TABLET | Refills: 1 | Status: CANCELLED | OUTPATIENT
Start: 2024-12-18

## 2024-12-19 RX ORDER — SIMVASTATIN 40 MG
40 TABLET ORAL NIGHTLY
Qty: 90 TABLET | Refills: 3 | Status: SHIPPED | OUTPATIENT
Start: 2024-12-19

## 2024-12-19 RX ORDER — PROPRANOLOL HYDROCHLORIDE 60 MG/1
60 CAPSULE, EXTENDED RELEASE ORAL DAILY
Qty: 90 CAPSULE | Refills: 3 | Status: SHIPPED | OUTPATIENT
Start: 2024-12-19

## 2024-12-23 ENCOUNTER — OFFICE VISIT (OUTPATIENT)
Facility: CLINIC | Age: 79
End: 2024-12-23
Payer: MEDICARE

## 2024-12-23 VITALS
HEIGHT: 59 IN | SYSTOLIC BLOOD PRESSURE: 129 MMHG | BODY MASS INDEX: 26.41 KG/M2 | TEMPERATURE: 97.6 F | RESPIRATION RATE: 16 BRPM | OXYGEN SATURATION: 99 % | HEART RATE: 67 BPM | DIASTOLIC BLOOD PRESSURE: 75 MMHG | WEIGHT: 131 LBS

## 2024-12-23 DIAGNOSIS — I10 PRIMARY HYPERTENSION: Primary | ICD-10-CM

## 2024-12-23 DIAGNOSIS — F02.80 DEMENTIA DUE TO MEDICAL CONDITION WITHOUT BEHAVIORAL DISTURBANCE (HCC): ICD-10-CM

## 2024-12-23 PROCEDURE — G8400 PT W/DXA NO RESULTS DOC: HCPCS | Performed by: FAMILY MEDICINE

## 2024-12-23 PROCEDURE — 3074F SYST BP LT 130 MM HG: CPT | Performed by: FAMILY MEDICINE

## 2024-12-23 PROCEDURE — 1126F AMNT PAIN NOTED NONE PRSNT: CPT | Performed by: FAMILY MEDICINE

## 2024-12-23 PROCEDURE — 99214 OFFICE O/P EST MOD 30 MIN: CPT | Performed by: FAMILY MEDICINE

## 2024-12-23 PROCEDURE — 1160F RVW MEDS BY RX/DR IN RCRD: CPT | Performed by: FAMILY MEDICINE

## 2024-12-23 PROCEDURE — 1090F PRES/ABSN URINE INCON ASSESS: CPT | Performed by: FAMILY MEDICINE

## 2024-12-23 PROCEDURE — 1036F TOBACCO NON-USER: CPT | Performed by: FAMILY MEDICINE

## 2024-12-23 PROCEDURE — 1123F ACP DISCUSS/DSCN MKR DOCD: CPT | Performed by: FAMILY MEDICINE

## 2024-12-23 PROCEDURE — 1159F MED LIST DOCD IN RCRD: CPT | Performed by: FAMILY MEDICINE

## 2024-12-23 PROCEDURE — G8419 CALC BMI OUT NRM PARAM NOF/U: HCPCS | Performed by: FAMILY MEDICINE

## 2024-12-23 PROCEDURE — G8482 FLU IMMUNIZE ORDER/ADMIN: HCPCS | Performed by: FAMILY MEDICINE

## 2024-12-23 PROCEDURE — G8427 DOCREV CUR MEDS BY ELIG CLIN: HCPCS | Performed by: FAMILY MEDICINE

## 2024-12-23 PROCEDURE — 3078F DIAST BP <80 MM HG: CPT | Performed by: FAMILY MEDICINE

## 2024-12-23 RX ORDER — LISINOPRIL 20 MG/1
20 TABLET ORAL DAILY
Qty: 90 TABLET | Refills: 1 | Status: SHIPPED | OUTPATIENT
Start: 2024-12-23

## 2024-12-23 SDOH — ECONOMIC STABILITY: FOOD INSECURITY: WITHIN THE PAST 12 MONTHS, YOU WORRIED THAT YOUR FOOD WOULD RUN OUT BEFORE YOU GOT MONEY TO BUY MORE.: NEVER TRUE

## 2024-12-23 SDOH — ECONOMIC STABILITY: FOOD INSECURITY: WITHIN THE PAST 12 MONTHS, THE FOOD YOU BOUGHT JUST DIDN'T LAST AND YOU DIDN'T HAVE MONEY TO GET MORE.: NEVER TRUE

## 2024-12-23 SDOH — ECONOMIC STABILITY: INCOME INSECURITY: HOW HARD IS IT FOR YOU TO PAY FOR THE VERY BASICS LIKE FOOD, HOUSING, MEDICAL CARE, AND HEATING?: NOT HARD AT ALL

## 2024-12-23 ASSESSMENT — PATIENT HEALTH QUESTIONNAIRE - PHQ9
SUM OF ALL RESPONSES TO PHQ QUESTIONS 1-9: 0
SUM OF ALL RESPONSES TO PHQ9 QUESTIONS 1 & 2: 0
1. LITTLE INTEREST OR PLEASURE IN DOING THINGS: NOT AT ALL
SUM OF ALL RESPONSES TO PHQ QUESTIONS 1-9: 0
2. FEELING DOWN, DEPRESSED OR HOPELESS: NOT AT ALL

## 2024-12-23 NOTE — PROGRESS NOTES
Chief Complaint   Patient presents with    Blood Pressure Check     Patient presents in office today for BP check.  No concerns.      \"Have you been to the ER, urgent care clinic since your last visit?  Hospitalized since your last visit?\"    NO    “Have you seen or consulted any other health care providers outside our system since your last visit?”    NO           
Artifical intellience software.  Quite often unanticipated grammatical, syntax, homophones, and other interpretive errors are inadvertently transcribed by the computer software.  Please disregard these errors.  Please excuse any errors that have escaped final proofreading.  Thank you.     Lorenzo Brewster M.D.    There are no Patient Instructions on file for this visit.

## 2025-01-05 DIAGNOSIS — G25.0 ESSENTIAL TREMOR: ICD-10-CM

## 2025-01-06 RX ORDER — RIVASTIGMINE 9.5 MG/24H
PATCH, EXTENDED RELEASE TRANSDERMAL
Qty: 90 PATCH | Refills: 1 | Status: SHIPPED | OUTPATIENT
Start: 2025-01-06

## 2025-01-06 RX ORDER — AZELASTINE HYDROCHLORIDE 0.5 MG/ML
SOLUTION/ DROPS OPHTHALMIC
Qty: 12 ML | Refills: 2 | Status: SHIPPED | OUTPATIENT
Start: 2025-01-06

## 2025-01-28 ENCOUNTER — CLINICAL DOCUMENTATION (OUTPATIENT)
Facility: CLINIC | Age: 80
End: 2025-01-28

## 2025-04-22 ENCOUNTER — OFFICE VISIT (OUTPATIENT)
Age: 80
End: 2025-04-22
Payer: MEDICARE

## 2025-04-22 VITALS
TEMPERATURE: 97 F | OXYGEN SATURATION: 96 % | RESPIRATION RATE: 16 BRPM | SYSTOLIC BLOOD PRESSURE: 152 MMHG | DIASTOLIC BLOOD PRESSURE: 68 MMHG | HEART RATE: 69 BPM

## 2025-04-22 DIAGNOSIS — G25.0 ESSENTIAL TREMOR: ICD-10-CM

## 2025-04-22 DIAGNOSIS — G43.709 CHRONIC MIGRAINE WITHOUT AURA WITHOUT STATUS MIGRAINOSUS, NOT INTRACTABLE: ICD-10-CM

## 2025-04-22 PROCEDURE — G8428 CUR MEDS NOT DOCUMENT: HCPCS | Performed by: NURSE PRACTITIONER

## 2025-04-22 PROCEDURE — 1036F TOBACCO NON-USER: CPT | Performed by: NURSE PRACTITIONER

## 2025-04-22 PROCEDURE — G8400 PT W/DXA NO RESULTS DOC: HCPCS | Performed by: NURSE PRACTITIONER

## 2025-04-22 PROCEDURE — 3078F DIAST BP <80 MM HG: CPT | Performed by: NURSE PRACTITIONER

## 2025-04-22 PROCEDURE — 99215 OFFICE O/P EST HI 40 MIN: CPT | Performed by: NURSE PRACTITIONER

## 2025-04-22 PROCEDURE — 1090F PRES/ABSN URINE INCON ASSESS: CPT | Performed by: NURSE PRACTITIONER

## 2025-04-22 PROCEDURE — G8419 CALC BMI OUT NRM PARAM NOF/U: HCPCS | Performed by: NURSE PRACTITIONER

## 2025-04-22 PROCEDURE — 3077F SYST BP >= 140 MM HG: CPT | Performed by: NURSE PRACTITIONER

## 2025-04-22 PROCEDURE — 1123F ACP DISCUSS/DSCN MKR DOCD: CPT | Performed by: NURSE PRACTITIONER

## 2025-04-22 RX ORDER — RIVASTIGMINE 9.5 MG/24H
PATCH, EXTENDED RELEASE TRANSDERMAL
Qty: 90 PATCH | Refills: 1 | Status: SHIPPED | OUTPATIENT
Start: 2025-04-22

## 2025-04-22 RX ORDER — MEMANTINE HYDROCHLORIDE 28 MG/1
CAPSULE, EXTENDED RELEASE ORAL
Qty: 90 CAPSULE | Refills: 1 | Status: SHIPPED | OUTPATIENT
Start: 2025-04-22

## 2025-04-23 NOTE — PROGRESS NOTES
Health - Occupational Stress Questionnaire     Feeling of Stress : Patient unable to answer   Social Connections: Socially Isolated (12/30/2024)    Social Connection and Isolation Panel [NHANES]     Frequency of Communication with Friends and Family: More than three times a week     Frequency of Social Gatherings with Friends and Family: More than three times a week     Attends Mandaeism Services: Never     Active Member of Clubs or Organizations: No     Attends Club or Organization Meetings: Never     Marital Status: Never    Intimate Partner Violence: Patient Unable To Answer (12/30/2024)    Humiliation, Afraid, Rape, and Kick questionnaire     Fear of Current or Ex-Partner: Patient unable to answer     Emotionally Abused: Patient unable to answer     Physically Abused: Patient unable to answer     Sexually Abused: Patient unable to answer   Housing Stability: Low Risk  (12/30/2024)    Housing Stability Vital Sign     Unable to Pay for Housing in the Last Year: No     Number of Times Moved in the Last Year: 0     Homeless in the Last Year: No       Review of Systems      Remainder of comprehensive review is negative.     Physical Exam :    BP (!) 152/68 (BP Site: Left Upper Arm, Patient Position: Sitting, BP Cuff Size: Large Adult)   Pulse 69   Temp 97 °F (36.1 °C)   Resp 16   SpO2 96%     General: Well defined, nourished, and groomed individual in no acute distress.    Musculoskeletal: Extremities revealed no edema and had full range of motion of joints.    Psych: Good mood and bright affect     NEUROLOGICAL EXAMINATION:    Mental Status: Alert and oriented to person, place     Cranial Nerves:    II, III, IV, VI: Visual acuity grossly intact. Visual fields are normal.    Pupils are equal, round, and reactive to light and accommodation.    Extra-ocular movements are full and fluid. Fundoscopic exam was benign, no ptosis or nystagmus.    V-XII: Hearing is grossly intact. Facial features are symmetric,

## 2025-05-07 DIAGNOSIS — I10 PRIMARY HYPERTENSION: ICD-10-CM

## 2025-05-07 DIAGNOSIS — R03.0 ELEVATED BLOOD PRESSURE READING: ICD-10-CM

## 2025-05-07 RX ORDER — AMLODIPINE BESYLATE 5 MG/1
5 TABLET ORAL DAILY
Qty: 90 TABLET | Refills: 1 | Status: SHIPPED | OUTPATIENT
Start: 2025-05-07

## 2025-05-21 ENCOUNTER — TELEPHONE (OUTPATIENT)
Facility: CLINIC | Age: 80
End: 2025-05-21

## 2025-06-13 DIAGNOSIS — I10 PRIMARY HYPERTENSION: ICD-10-CM

## 2025-06-16 RX ORDER — LISINOPRIL 20 MG/1
20 TABLET ORAL DAILY
Qty: 90 TABLET | Refills: 1 | Status: SHIPPED | OUTPATIENT
Start: 2025-06-16

## 2025-07-29 RX ORDER — AZELASTINE HYDROCHLORIDE 0.5 MG/ML
SOLUTION/ DROPS OPHTHALMIC
Qty: 12 ML | Refills: 2 | Status: SHIPPED | OUTPATIENT
Start: 2025-07-29

## 2025-08-25 ENCOUNTER — OFFICE VISIT (OUTPATIENT)
Facility: CLINIC | Age: 80
End: 2025-08-25
Payer: MEDICARE

## 2025-08-25 VITALS
BODY MASS INDEX: 26.61 KG/M2 | SYSTOLIC BLOOD PRESSURE: 139 MMHG | WEIGHT: 132 LBS | OXYGEN SATURATION: 95 % | HEIGHT: 59 IN | TEMPERATURE: 97.7 F | HEART RATE: 69 BPM | RESPIRATION RATE: 19 BRPM | DIASTOLIC BLOOD PRESSURE: 80 MMHG

## 2025-08-25 DIAGNOSIS — I10 PRIMARY HYPERTENSION: Primary | ICD-10-CM

## 2025-08-25 DIAGNOSIS — E78.00 HIGH CHOLESTEROL: ICD-10-CM

## 2025-08-25 DIAGNOSIS — F20.5 RESIDUAL SCHIZOPHRENIA (HCC): ICD-10-CM

## 2025-08-25 DIAGNOSIS — F02.80 DEMENTIA DUE TO MEDICAL CONDITION WITHOUT BEHAVIORAL DISTURBANCE (HCC): ICD-10-CM

## 2025-08-25 DIAGNOSIS — N18.31 STAGE 3A CHRONIC KIDNEY DISEASE (HCC): ICD-10-CM

## 2025-08-25 PROCEDURE — 99213 OFFICE O/P EST LOW 20 MIN: CPT | Performed by: FAMILY MEDICINE

## 2025-08-25 PROCEDURE — 1123F ACP DISCUSS/DSCN MKR DOCD: CPT | Performed by: FAMILY MEDICINE

## 2025-08-25 PROCEDURE — 1126F AMNT PAIN NOTED NONE PRSNT: CPT | Performed by: FAMILY MEDICINE

## 2025-08-25 PROCEDURE — 1090F PRES/ABSN URINE INCON ASSESS: CPT | Performed by: FAMILY MEDICINE

## 2025-08-25 PROCEDURE — G8419 CALC BMI OUT NRM PARAM NOF/U: HCPCS | Performed by: FAMILY MEDICINE

## 2025-08-25 PROCEDURE — 1036F TOBACCO NON-USER: CPT | Performed by: FAMILY MEDICINE

## 2025-08-25 PROCEDURE — 3079F DIAST BP 80-89 MM HG: CPT | Performed by: FAMILY MEDICINE

## 2025-08-25 PROCEDURE — G8427 DOCREV CUR MEDS BY ELIG CLIN: HCPCS | Performed by: FAMILY MEDICINE

## 2025-08-25 PROCEDURE — 1159F MED LIST DOCD IN RCRD: CPT | Performed by: FAMILY MEDICINE

## 2025-08-25 PROCEDURE — 3075F SYST BP GE 130 - 139MM HG: CPT | Performed by: FAMILY MEDICINE

## 2025-08-25 PROCEDURE — G8400 PT W/DXA NO RESULTS DOC: HCPCS | Performed by: FAMILY MEDICINE

## 2025-08-25 SDOH — ECONOMIC STABILITY: FOOD INSECURITY: WITHIN THE PAST 12 MONTHS, THE FOOD YOU BOUGHT JUST DIDN'T LAST AND YOU DIDN'T HAVE MONEY TO GET MORE.: NEVER TRUE

## 2025-08-25 SDOH — ECONOMIC STABILITY: FOOD INSECURITY: WITHIN THE PAST 12 MONTHS, YOU WORRIED THAT YOUR FOOD WOULD RUN OUT BEFORE YOU GOT MONEY TO BUY MORE.: NEVER TRUE

## 2025-08-25 ASSESSMENT — PATIENT HEALTH QUESTIONNAIRE - PHQ9: DEPRESSION UNABLE TO ASSESS: FUNCTIONAL CAPACITY MOTIVATION LIMITS ACCURACY
